# Patient Record
Sex: MALE | Race: BLACK OR AFRICAN AMERICAN | NOT HISPANIC OR LATINO | Employment: FULL TIME | URBAN - METROPOLITAN AREA
[De-identification: names, ages, dates, MRNs, and addresses within clinical notes are randomized per-mention and may not be internally consistent; named-entity substitution may affect disease eponyms.]

---

## 2023-04-14 LAB
ALANINE AMINOTRANSFERASE (SGPT) (U/L) IN SER/PLAS: 14 U/L (ref 10–52)
ALBUMIN (G/DL) IN SER/PLAS: 4.2 G/DL (ref 3.4–5)
ALKALINE PHOSPHATASE (U/L) IN SER/PLAS: 71 U/L (ref 33–136)
ANION GAP IN SER/PLAS: 12 MMOL/L (ref 10–20)
ASPARTATE AMINOTRANSFERASE (SGOT) (U/L) IN SER/PLAS: 21 U/L (ref 9–39)
BASOPHILS (10*3/UL) IN BLOOD BY AUTOMATED COUNT: 0.04 X10E9/L (ref 0–0.1)
BASOPHILS/100 LEUKOCYTES IN BLOOD BY AUTOMATED COUNT: 0.6 % (ref 0–2)
BILIRUBIN TOTAL (MG/DL) IN SER/PLAS: 0.6 MG/DL (ref 0–1.2)
CALCIUM (MG/DL) IN SER/PLAS: 9.8 MG/DL (ref 8.6–10.6)
CARBON DIOXIDE, TOTAL (MMOL/L) IN SER/PLAS: 31 MMOL/L (ref 21–32)
CHLORIDE (MMOL/L) IN SER/PLAS: 106 MMOL/L (ref 98–107)
CHOLESTEROL (MG/DL) IN SER/PLAS: 164 MG/DL (ref 0–199)
CHOLESTEROL IN HDL (MG/DL) IN SER/PLAS: 53.8 MG/DL
CHOLESTEROL/HDL RATIO: 3
CREATININE (MG/DL) IN SER/PLAS: 1.16 MG/DL (ref 0.5–1.3)
EOSINOPHILS (10*3/UL) IN BLOOD BY AUTOMATED COUNT: 0.23 X10E9/L (ref 0–0.7)
EOSINOPHILS/100 LEUKOCYTES IN BLOOD BY AUTOMATED COUNT: 3.3 % (ref 0–6)
ERYTHROCYTE DISTRIBUTION WIDTH (RATIO) BY AUTOMATED COUNT: 14.1 % (ref 11.5–14.5)
ERYTHROCYTE MEAN CORPUSCULAR HEMOGLOBIN CONCENTRATION (G/DL) BY AUTOMATED: 32.3 G/DL (ref 32–36)
ERYTHROCYTE MEAN CORPUSCULAR VOLUME (FL) BY AUTOMATED COUNT: 101 FL (ref 80–100)
ERYTHROCYTES (10*6/UL) IN BLOOD BY AUTOMATED COUNT: 4.56 X10E12/L (ref 4.5–5.9)
GFR MALE: 70 ML/MIN/1.73M2
GLUCOSE (MG/DL) IN SER/PLAS: 84 MG/DL (ref 74–99)
HEMATOCRIT (%) IN BLOOD BY AUTOMATED COUNT: 46.2 % (ref 41–52)
HEMOGLOBIN (G/DL) IN BLOOD: 14.9 G/DL (ref 13.5–17.5)
IMMATURE GRANULOCYTES/100 LEUKOCYTES IN BLOOD BY AUTOMATED COUNT: 0.3 % (ref 0–0.9)
LDL: 96 MG/DL (ref 0–99)
LEUKOCYTES (10*3/UL) IN BLOOD BY AUTOMATED COUNT: 7 X10E9/L (ref 4.4–11.3)
LYMPHOCYTES (10*3/UL) IN BLOOD BY AUTOMATED COUNT: 1.63 X10E9/L (ref 1.2–4.8)
LYMPHOCYTES/100 LEUKOCYTES IN BLOOD BY AUTOMATED COUNT: 23.4 % (ref 13–44)
MONOCYTES (10*3/UL) IN BLOOD BY AUTOMATED COUNT: 0.6 X10E9/L (ref 0.1–1)
MONOCYTES/100 LEUKOCYTES IN BLOOD BY AUTOMATED COUNT: 8.6 % (ref 2–10)
NEUTROPHILS (10*3/UL) IN BLOOD BY AUTOMATED COUNT: 4.46 X10E9/L (ref 1.2–7.7)
NEUTROPHILS/100 LEUKOCYTES IN BLOOD BY AUTOMATED COUNT: 63.8 % (ref 40–80)
NRBC (PER 100 WBCS) BY AUTOMATED COUNT: 0 /100 WBC (ref 0–0)
PLATELETS (10*3/UL) IN BLOOD AUTOMATED COUNT: 262 X10E9/L (ref 150–450)
POTASSIUM (MMOL/L) IN SER/PLAS: 4.4 MMOL/L (ref 3.5–5.3)
PROTEIN TOTAL: 6.6 G/DL (ref 6.4–8.2)
SODIUM (MMOL/L) IN SER/PLAS: 145 MMOL/L (ref 136–145)
THYROTROPIN (MIU/L) IN SER/PLAS BY DETECTION LIMIT <= 0.05 MIU/L: 1.09 MIU/L (ref 0.44–3.98)
TRIGLYCERIDE (MG/DL) IN SER/PLAS: 70 MG/DL (ref 0–149)
UREA NITROGEN (MG/DL) IN SER/PLAS: 21 MG/DL (ref 6–23)
VLDL: 14 MG/DL (ref 0–40)

## 2023-04-20 ENCOUNTER — OFFICE VISIT (OUTPATIENT)
Dept: PRIMARY CARE | Facility: CLINIC | Age: 63
End: 2023-04-20
Payer: COMMERCIAL

## 2023-04-20 VITALS
DIASTOLIC BLOOD PRESSURE: 74 MMHG | BODY MASS INDEX: 36.98 KG/M2 | SYSTOLIC BLOOD PRESSURE: 128 MMHG | WEIGHT: 244 LBS | HEIGHT: 68 IN

## 2023-04-20 DIAGNOSIS — F32.A DEPRESSION, UNSPECIFIED DEPRESSION TYPE: ICD-10-CM

## 2023-04-20 DIAGNOSIS — E66.9 OBESITY, UNSPECIFIED CLASSIFICATION, UNSPECIFIED OBESITY TYPE, UNSPECIFIED WHETHER SERIOUS COMORBIDITY PRESENT: Primary | ICD-10-CM

## 2023-04-20 PROBLEM — Z98.84 BARIATRIC SURGERY STATUS: Status: ACTIVE | Noted: 2023-04-20

## 2023-04-20 PROBLEM — M54.16 RIGHT LUMBAR RADICULOPATHY: Status: ACTIVE | Noted: 2023-04-20

## 2023-04-20 PROBLEM — R10.9 ABDOMINAL PAIN: Status: ACTIVE | Noted: 2023-04-20

## 2023-04-20 PROBLEM — K21.9 GERD (GASTROESOPHAGEAL REFLUX DISEASE): Status: ACTIVE | Noted: 2023-04-20

## 2023-04-20 PROBLEM — I95.9 HYPOTENSION: Status: ACTIVE | Noted: 2023-04-20

## 2023-04-20 PROBLEM — M19.049 OSTEOARTHRITIS, HAND: Status: ACTIVE | Noted: 2023-04-20

## 2023-04-20 PROBLEM — G47.33 OBSTRUCTIVE SLEEP APNEA, ADULT: Status: ACTIVE | Noted: 2023-04-20

## 2023-04-20 PROBLEM — N28.9 RENAL INSUFFICIENCY: Status: ACTIVE | Noted: 2023-04-20

## 2023-04-20 PROBLEM — M65.30 ACQUIRED TRIGGER FINGER: Status: ACTIVE | Noted: 2023-04-20

## 2023-04-20 PROBLEM — M17.11 DEGENERATIVE JOINT DISEASE OF RIGHT KNEE: Status: ACTIVE | Noted: 2023-04-20

## 2023-04-20 PROBLEM — E55.9 VITAMIN D DEFICIENCY: Status: ACTIVE | Noted: 2023-04-20

## 2023-04-20 PROBLEM — D64.9 ANEMIA: Status: ACTIVE | Noted: 2023-04-20

## 2023-04-20 PROBLEM — G47.00 INSOMNIA: Status: ACTIVE | Noted: 2023-04-20

## 2023-04-20 PROBLEM — L70.4 INFANTILE ACNE: Status: ACTIVE | Noted: 2023-04-20

## 2023-04-20 PROBLEM — R63.5 WEIGHT GAIN: Status: ACTIVE | Noted: 2023-04-20

## 2023-04-20 PROBLEM — F54 PSYCHOLOGICAL FACTOR AFFECTING PHYSICAL CONDITION: Status: ACTIVE | Noted: 2023-04-20

## 2023-04-20 PROBLEM — E78.00 HYPERCHOLESTEROLEMIA: Status: ACTIVE | Noted: 2023-04-20

## 2023-04-20 PROBLEM — E66.01 MORBID OBESITY (MULTI): Status: ACTIVE | Noted: 2023-04-20

## 2023-04-20 PROBLEM — K64.9 HEMORRHOIDS: Status: ACTIVE | Noted: 2023-04-20

## 2023-04-20 PROBLEM — M19.90 ARTHRITIS: Status: ACTIVE | Noted: 2023-04-20

## 2023-04-20 PROBLEM — R73.03 PRE-DIABETES: Status: ACTIVE | Noted: 2023-04-20

## 2023-04-20 PROBLEM — F41.9 ANXIETY: Status: ACTIVE | Noted: 2023-04-20

## 2023-04-20 PROBLEM — M54.50 LOW BACK PAIN: Status: ACTIVE | Noted: 2023-04-20

## 2023-04-20 PROBLEM — I10 HYPERTENSION: Status: ACTIVE | Noted: 2023-04-20

## 2023-04-20 PROBLEM — F10.20 ALCOHOLISM (MULTI): Status: ACTIVE | Noted: 2023-04-20

## 2023-04-20 PROBLEM — R42 DIZZINESS: Status: ACTIVE | Noted: 2023-04-20

## 2023-04-20 PROBLEM — I48.91 AFIB (MULTI): Status: ACTIVE | Noted: 2023-04-20

## 2023-04-20 PROBLEM — M17.11 ARTHRITIS OF RIGHT KNEE: Status: ACTIVE | Noted: 2023-04-20

## 2023-04-20 PROBLEM — M96.1 LUMBAR POSTLAMINECTOMY SYNDROME: Status: ACTIVE | Noted: 2023-04-20

## 2023-04-20 PROBLEM — M25.561 RIGHT KNEE PAIN: Status: ACTIVE | Noted: 2023-04-20

## 2023-04-20 PROBLEM — M79.643 HAND PAIN: Status: ACTIVE | Noted: 2023-04-20

## 2023-04-20 PROBLEM — R35.0 URINE FREQUENCY: Status: ACTIVE | Noted: 2023-04-20

## 2023-04-20 PROBLEM — M32.9 LUPUS (MULTI): Status: ACTIVE | Noted: 2023-04-20

## 2023-04-20 PROCEDURE — 99214 OFFICE O/P EST MOD 30 MIN: CPT | Performed by: INTERNAL MEDICINE

## 2023-04-20 PROCEDURE — 3078F DIAST BP <80 MM HG: CPT | Performed by: INTERNAL MEDICINE

## 2023-04-20 PROCEDURE — 3074F SYST BP LT 130 MM HG: CPT | Performed by: INTERNAL MEDICINE

## 2023-04-20 RX ORDER — SILDENAFIL 100 MG/1
100 TABLET, FILM COATED ORAL
COMMUNITY
Start: 2023-02-06 | End: 2024-04-24 | Stop reason: WASHOUT

## 2023-04-20 RX ORDER — ROSUVASTATIN CALCIUM 40 MG/1
40 TABLET, COATED ORAL DAILY
COMMUNITY
End: 2023-08-19 | Stop reason: SDUPTHER

## 2023-04-20 RX ORDER — DULOXETIN HYDROCHLORIDE 60 MG/1
60 CAPSULE, DELAYED RELEASE ORAL 2 TIMES DAILY
COMMUNITY
End: 2023-06-05 | Stop reason: SDUPTHER

## 2023-04-20 RX ORDER — CHOLECALCIFEROL (VITAMIN D3) 10(400)/ML
DROPS ORAL
COMMUNITY
Start: 2022-12-20 | End: 2024-04-10 | Stop reason: WASHOUT

## 2023-04-20 RX ORDER — APIXABAN 5 MG/1
5 TABLET, FILM COATED ORAL 2 TIMES DAILY
COMMUNITY
End: 2024-04-10 | Stop reason: WASHOUT

## 2023-04-20 RX ORDER — IBUPROFEN 800 MG/1
1 TABLET ORAL
COMMUNITY
Start: 2023-02-24 | End: 2024-04-24 | Stop reason: WASHOUT

## 2023-04-20 RX ORDER — ACETAMINOPHEN AND CODEINE PHOSPHATE 300; 30 MG/1; MG/1
TABLET ORAL
COMMUNITY
Start: 2023-02-24 | End: 2024-04-10 | Stop reason: WASHOUT

## 2023-04-20 RX ORDER — TRAZODONE HYDROCHLORIDE 150 MG/1
1 TABLET ORAL NIGHTLY
COMMUNITY
Start: 2022-07-21 | End: 2023-08-29

## 2023-04-20 RX ORDER — GABAPENTIN 300 MG/1
300 CAPSULE ORAL 3 TIMES DAILY
COMMUNITY
End: 2023-07-06 | Stop reason: SDUPTHER

## 2023-04-20 RX ORDER — FERROUS SULFATE 324(65)MG
1 TABLET, DELAYED RELEASE (ENTERIC COATED) ORAL DAILY
COMMUNITY
Start: 2019-04-15 | End: 2024-02-28 | Stop reason: SDUPTHER

## 2023-04-20 RX ORDER — OMEPRAZOLE 40 MG/1
1 CAPSULE, DELAYED RELEASE ORAL DAILY
COMMUNITY
Start: 2022-12-10 | End: 2024-04-10 | Stop reason: WASHOUT

## 2023-04-20 RX ORDER — SOLIFENACIN SUCCINATE 10 MG/1
10 TABLET, FILM COATED ORAL DAILY
COMMUNITY

## 2023-04-20 RX ORDER — MIRTAZAPINE 45 MG/1
45 TABLET, FILM COATED ORAL NIGHTLY
Qty: 30 TABLET | Refills: 2 | Status: SHIPPED | OUTPATIENT
Start: 2023-04-20 | End: 2023-08-29 | Stop reason: SDUPTHER

## 2023-04-20 RX ORDER — NAPROXEN SODIUM 220 MG/1
TABLET, FILM COATED ORAL
COMMUNITY

## 2023-04-20 RX ORDER — METOPROLOL TARTRATE 50 MG/1
1 TABLET ORAL 2 TIMES DAILY
COMMUNITY
Start: 2023-03-26 | End: 2023-10-10 | Stop reason: SDUPTHER

## 2023-04-20 RX ORDER — EZETIMIBE 10 MG/1
10 TABLET ORAL DAILY
COMMUNITY
End: 2023-06-05 | Stop reason: SDUPTHER

## 2023-04-20 RX ORDER — FOLIC ACID 1 MG/1
1 TABLET ORAL DAILY
COMMUNITY
End: 2024-04-24 | Stop reason: WASHOUT

## 2023-04-20 RX ORDER — LOSARTAN POTASSIUM 50 MG/1
50 TABLET ORAL DAILY
COMMUNITY
End: 2023-06-16 | Stop reason: SDUPTHER

## 2023-04-20 RX ORDER — URSODIOL 300 MG/1
1 CAPSULE ORAL 2 TIMES DAILY
COMMUNITY
Start: 2023-04-07 | End: 2024-04-10 | Stop reason: WASHOUT

## 2023-04-20 RX ORDER — AMIODARONE HYDROCHLORIDE 200 MG/1
1 TABLET ORAL DAILY
COMMUNITY
Start: 2019-10-25 | End: 2023-12-04

## 2023-04-20 NOTE — PROGRESS NOTES
OFFICE NOTE    NAME OF THE PATIENT: Dilshad Freeman    YOB: 1960    CHIEF COMPLAINT:  It is always a delight to serve Mr. Freeman.  Today, he came here:  He wants to lose weight.  He wants to go back on Contrave.  He has hit a plateau now.  He is not happy with that.  Sleep.  He told me that mirtazapine worked before.  He wants to get rid of the current medication.  Follow-up on blood work and other conditions.  Appetite and weight are okay.  No problem.  He is living by himself.  He is living okay.    PAST MEDICAL HISTORY:  Reviewed on EMR, unchanged.  Many significant for status post big back surgery, he had and gastric bypass.  Hypertension, high cholesterol, pre-diabetic, depression, and chronic pain syndrome.    CURRENT MEDICATIONS:  Reviewed on EMR, unchanged.  He is on Cymbalta, amiodarone, ursodiol, Eliquis, metoprolol, rosuvastatin, ferrous sulfate, Zetia, gabapentin, VESIcare, losartan, and now mirtazapine.    ALLERGIES:  Reviewed on EMR, unchanged.    SOCIAL HISTORY:  Reviewed on EMR, unchanged.  He does not smoke and does not drink alcohol.    FAMILY HISTORY:  Reviewed on EMR, unchanged.    REVIEW OF SYSTEMS:  All 12 systems reviewed and pertaining covered in history and physical.    PHYSICAL EXAMINATION  VITAL SIGNS:  As recorded and reviewed from EMR.  RESPIRATORY:  The patient had normal inspirations and expirations.  The breath sounds were equal bilaterally and clear to auscultation.  CARDIOVASCULAR:  The patient had S1 normal, split S2 without obvious rubs, clicks, or murmurs.    GASTROINTESTINAL:  There was no hepatosplenomegaly.  There were no palpable masses and no inguinal nodes.  EXTREMITIES:  Legs had no edema.  NEUROLOGIC:  The patient had normal cranial nerves.  The reflexes, sensory, and motor examination were grossly within normal limits.    LAB WORK:  Laboratory testing discussed.    ASSESSMENT AND PLAN:  Overweight.  He wants to go back on Contrave.  He tolerated it  "well, start.  Insomnia.  We will factor in mirtazapine, it worked for him.  Back pain, stable.  Hypertension, excellent.  Cholesterol, excellent.  Pre-diabetic, doing good.  Neuropathic pain.  Neurontin.  Anxiety and depression, okay.  Follow up in a month.  Continue to follow.      Kindly review this note in conjunction with EMR.   Subjective   Patient ID: Dilshad Freeman is a 62 y.o. male who presents for Follow-up.      HPI    Review of Systems    Objective   /74   Ht 1.727 m (5' 8\")   Wt 111 kg (244 lb)   BMI 37.10 kg/m²       Physical Exam    Assessment/Plan   Problem List Items Addressed This Visit    None        "

## 2023-06-01 ENCOUNTER — TELEPHONE (OUTPATIENT)
Dept: PRIMARY CARE | Facility: CLINIC | Age: 63
End: 2023-06-01
Payer: COMMERCIAL

## 2023-06-05 ENCOUNTER — OFFICE VISIT (OUTPATIENT)
Dept: PRIMARY CARE | Facility: CLINIC | Age: 63
End: 2023-06-05
Payer: COMMERCIAL

## 2023-06-05 VITALS
WEIGHT: 245 LBS | HEIGHT: 68 IN | DIASTOLIC BLOOD PRESSURE: 86 MMHG | SYSTOLIC BLOOD PRESSURE: 144 MMHG | BODY MASS INDEX: 37.13 KG/M2

## 2023-06-05 DIAGNOSIS — G56.00 CARPAL TUNNEL SYNDROME, UNSPECIFIED LATERALITY: ICD-10-CM

## 2023-06-05 DIAGNOSIS — R20.0 NUMBNESS AND TINGLING: ICD-10-CM

## 2023-06-05 DIAGNOSIS — M54.2 NECK PAIN: ICD-10-CM

## 2023-06-05 DIAGNOSIS — F32.A DEPRESSION, UNSPECIFIED DEPRESSION TYPE: ICD-10-CM

## 2023-06-05 DIAGNOSIS — F41.9 ANXIETY: ICD-10-CM

## 2023-06-05 DIAGNOSIS — R20.2 NUMBNESS AND TINGLING: ICD-10-CM

## 2023-06-05 PROCEDURE — 3077F SYST BP >= 140 MM HG: CPT | Performed by: INTERNAL MEDICINE

## 2023-06-05 PROCEDURE — 99214 OFFICE O/P EST MOD 30 MIN: CPT | Performed by: INTERNAL MEDICINE

## 2023-06-05 PROCEDURE — 1159F MED LIST DOCD IN RCRD: CPT | Performed by: INTERNAL MEDICINE

## 2023-06-05 PROCEDURE — 3079F DIAST BP 80-89 MM HG: CPT | Performed by: INTERNAL MEDICINE

## 2023-06-05 RX ORDER — DULOXETIN HYDROCHLORIDE 60 MG/1
60 CAPSULE, DELAYED RELEASE ORAL 2 TIMES DAILY
Qty: 180 CAPSULE | Refills: 0 | Status: SHIPPED | OUTPATIENT
Start: 2023-06-05 | End: 2023-10-10 | Stop reason: SDUPTHER

## 2023-06-05 RX ORDER — EZETIMIBE 10 MG/1
10 TABLET ORAL DAILY
Qty: 90 TABLET | Refills: 0 | Status: SHIPPED | OUTPATIENT
Start: 2023-06-05 | End: 2023-10-10 | Stop reason: SDUPTHER

## 2023-06-05 NOTE — PROGRESS NOTES
OFFICE NOTE    NAME OF THE PATIENT:   Dilshad Freeman     YOB: 1960    CHIEF COMPLAINT:  This gentleman today came here for multiple medical issues.  He is not feeling okay.  Left arm for no good reason he got this tremors.  When he tried to feed himself, he cannot get it right and this is all new.  He never had this problem.  All problems are left side, nothing on right side.  He does not recall fall, trauma, injury.  He brought medications for review.    PAST MEDICAL HISTORY:  Reviewed on EMR, unchanged.    CURRENT MEDICATIONS:  Reviewed on EMR, unchanged.  Rosuvastatin, VESIcare, amiodarone, metoprolol, duloxetine, Zetia, losartan, gabapentin, and Eliquis.    ALLERGIES:  Reviewed on EMR, unchanged.    SOCIAL HISTORY:  Reviewed on EMR, unchanged.  He does not smoke, does not drink alcohol.    FAMILY HISTORY:  Reviewed on EMR, unchanged.    REVIEW OF SYSTEMS:  All 12 systems reviewed and pertaining covered in history and physical.    PHYSICAL EXAMINATION  VITAL SIGNS:  As recorded and reviewed from EMR.  RESPIRATORY:  The patient had normal inspirations and expirations.  The breath sounds were equal bilaterally and clear to auscultation.  CARDIOVASCULAR:  The patient had S1 normal, split S2 without obvious rubs, clicks, or murmurs.    GASTROINTESTINAL:  There was no hepatosplenomegaly.  There were no palpable masses and no inguinal nodes.  EXTREMITIES:  Legs had no edema.  NEUROLOGIC:  The patient had normal cranial nerves.  The reflexes, sensory, and motor examination were grossly within normal limits.  LOCAL EXAM:  Left hand arm tremors present, frequency 3 to 5, at times coarse.  Finger-to-nose test  with difficulty he could do, tip of the finger to finger of the nose left side.  Right side normal.  Balance test okay.  No other weakness.  No other tremors in any part of the body.    LAB WORK:  Laboratory testing ordered.    ASSESSMENT AND PLAN:  Tremors in left hand.  Differential diagnosis  "Parkinson's disease in left arm.  I doubt it is essential tremor.  It is only in left arm.  Right arm nothing.  I decided to do MRI of C-spine, nerve conduction study to rule out any compression neuropathy.  I will also send him to Movement disorder specialist for that.  We thought about starting propanolol.  He is already on metoprolol.  I will monitor.  Hypertension, okay.  High cholesterol, stable.  Anxiety and depression, on duloxetine.  Follow-up in a week after testing.  Meanwhile, he might have to go to Occupational Medicine to get the proper fork and spoon so he can eat.    Kindly review this note in conjunction with EMR.   Subjective   Patient ID: Dilshad Freeman is a 63 y.o. male who presents for Follow-up.      HPI    Review of Systems    Objective   /86   Ht 1.727 m (5' 8\")   Wt 111 kg (245 lb)   BMI 37.25 kg/m²       Physical Exam    Assessment/Plan   Problem List Items Addressed This Visit          Other    Anxiety    Depression         "

## 2023-06-13 DIAGNOSIS — I10 PRIMARY HYPERTENSION: ICD-10-CM

## 2023-06-16 RX ORDER — LOSARTAN POTASSIUM 50 MG/1
50 TABLET ORAL DAILY
Qty: 90 TABLET | Refills: 1 | Status: SHIPPED | OUTPATIENT
Start: 2023-06-16 | End: 2024-02-28 | Stop reason: SDUPTHER

## 2023-06-22 DIAGNOSIS — G56.00 CARPAL TUNNEL SYNDROME, UNSPECIFIED LATERALITY: ICD-10-CM

## 2023-07-06 DIAGNOSIS — M54.50 LOW BACK PAIN, UNSPECIFIED BACK PAIN LATERALITY, UNSPECIFIED CHRONICITY, UNSPECIFIED WHETHER SCIATICA PRESENT: ICD-10-CM

## 2023-07-06 RX ORDER — GABAPENTIN 300 MG/1
300 CAPSULE ORAL 3 TIMES DAILY
Qty: 270 CAPSULE | Refills: 0 | Status: SHIPPED | OUTPATIENT
Start: 2023-07-06 | End: 2024-04-10 | Stop reason: WASHOUT

## 2023-08-08 ENCOUNTER — HOSPITAL ENCOUNTER (OUTPATIENT)
Dept: DATA CONVERSION | Facility: HOSPITAL | Age: 63
Discharge: HOME | End: 2023-08-11
Attending: EMERGENCY MEDICINE

## 2023-08-08 DIAGNOSIS — M54.9 DORSALGIA, UNSPECIFIED: ICD-10-CM

## 2023-08-08 DIAGNOSIS — R41.0 DISORIENTATION, UNSPECIFIED: ICD-10-CM

## 2023-08-08 DIAGNOSIS — F10.139 ALCOHOL ABUSE WITH WITHDRAWAL, UNSPECIFIED (MULTI): ICD-10-CM

## 2023-08-08 DIAGNOSIS — F41.9 ANXIETY DISORDER, UNSPECIFIED: ICD-10-CM

## 2023-08-08 DIAGNOSIS — I10 ESSENTIAL (PRIMARY) HYPERTENSION: ICD-10-CM

## 2023-08-08 DIAGNOSIS — Z79.01 LONG TERM (CURRENT) USE OF ANTICOAGULANTS: ICD-10-CM

## 2023-08-08 DIAGNOSIS — Z79.899 OTHER LONG TERM (CURRENT) DRUG THERAPY: ICD-10-CM

## 2023-08-08 DIAGNOSIS — Z79.82 LONG TERM (CURRENT) USE OF ASPIRIN: ICD-10-CM
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
## 2023-08-08 DIAGNOSIS — F32.A DEPRESSION, UNSPECIFIED: ICD-10-CM

## 2023-08-08 LAB
ALBUMIN SERPL-MCNC: 4.1 GM/DL (ref 3.5–5)
ALBUMIN/GLOB SERPL: 1.5 RATIO (ref 1.5–3)
ALP BLD-CCNC: 95 U/L (ref 35–125)
ALT SERPL-CCNC: 14 U/L (ref 5–40)
AMPHETAMINES UR QL SCN>1000 NG/ML: NEGATIVE
ANION GAP SERPL CALCULATED.3IONS-SCNC: 12 MMOL/L (ref 0–19)
AST SERPL-CCNC: 26 U/L (ref 5–40)
BACTERIA UR QL AUTO: NEGATIVE
BARBITURATES UR QL SCN>300 NG/ML: NEGATIVE
BASOPHILS # BLD AUTO: 0.05 K/UL (ref 0–0.22)
BASOPHILS NFR BLD AUTO: 0.9 % (ref 0–1)
BENZODIAZ UR QL SCN>300 NG/ML: NEGATIVE
BILIRUB SERPL-MCNC: 0.5 MG/DL (ref 0.1–1.2)
BILIRUB UR QL STRIP.AUTO: NEGATIVE
BUN SERPL-MCNC: 20 MG/DL (ref 8–25)
BUN/CREAT SERPL: 18.2 RATIO (ref 8–21)
BZE UR QL SCN>300 NG/ML: NEGATIVE
CALCIUM SERPL-MCNC: 9.3 MG/DL (ref 8.5–10.4)
CANNABINOIDS UR QL SCN>50 NG/ML: NEGATIVE
CHLORIDE SERPL-SCNC: 99 MMOL/L (ref 97–107)
CLARITY UR: CLEAR
CO2 SERPL-SCNC: 25 MMOL/L (ref 24–31)
COLOR UR: YELLOW
CREAT SERPL-MCNC: 1.1 MG/DL (ref 0.4–1.6)
DEPRECATED RDW RBC AUTO: 53.5 FL (ref 37–54)
DIFFERENTIAL METHOD BLD: ABNORMAL
DRUG SCREEN COMMENT UR-IMP: NORMAL
EOSINOPHIL # BLD AUTO: 0.21 K/UL (ref 0–0.45)
EOSINOPHIL NFR BLD: 3.6 % (ref 0–3)
ERYTHROCYTE [DISTWIDTH] IN BLOOD BY AUTOMATED COUNT: 15 % (ref 11.7–15)
ETHANOL SERPL-MCNC: 0.01 GM/DL (ref 0–0.01)
EUA DISCLAIMER: NORMAL
FENTANYL+NORFENTANYL UR QL SCN: NEGATIVE
FLUAV RNA NPH QL NAA+PROBE: NEGATIVE
FLUBV RNA NPH QL NAA+PROBE: NEGATIVE
GFR SERPL CREATININE-BSD FRML MDRD: 74 ML/MIN/1.73 M2
GLOBULIN SER-MCNC: 2.7 G/DL (ref 1.9–3.7)
GLUCOSE SERPL-MCNC: 78 MG/DL (ref 65–99)
GLUCOSE UR STRIP.AUTO-MCNC: NEGATIVE MG/DL
HCT VFR BLD AUTO: 38.1 % (ref 41–50)
HGB BLD-MCNC: 13.2 GM/DL (ref 13.5–16.5)
HGB UR QL STRIP.AUTO: 1 /HPF (ref 0–3)
HGB UR QL: NEGATIVE
HYALINE CASTS UR QL AUTO: ABNORMAL /LPF
IMM GRANULOCYTES # BLD AUTO: 0.03 K/UL (ref 0–0.1)
KETONES UR QL STRIP.AUTO: NEGATIVE
LEUKOCYTE ESTERASE UR QL STRIP.AUTO: NEGATIVE
LYMPHOCYTES # BLD AUTO: 1 K/UL (ref 1.2–3.2)
LYMPHOCYTES NFR BLD MANUAL: 17.2 % (ref 20–40)
MCH RBC QN AUTO: 33.6 PG (ref 26–34)
MCHC RBC AUTO-ENTMCNC: 34.6 % (ref 31–37)
MCV RBC AUTO: 96.9 FL (ref 80–100)
METHADONE UR QL SCN>300 NG/ML: NEGATIVE
MICROSCOPIC (UA): ABNORMAL
MONOCYTES # BLD AUTO: 0.46 K/UL (ref 0–0.8)
MONOCYTES NFR BLD MANUAL: 7.9 % (ref 0–8)
NEUTROPHILS # BLD AUTO: 4.07 K/UL
NEUTROPHILS # BLD AUTO: 4.07 K/UL (ref 1.8–7.7)
NEUTROPHILS.IMMATURE NFR BLD: 0.5 % (ref 0–1)
NEUTS SEG NFR BLD: 69.9 % (ref 50–70)
NITRITE UR QL STRIP.AUTO: NEGATIVE
NRBC BLD-RTO: 0 /100 WBC
OPIATES UR QL SCN>300 NG/ML: NEGATIVE
OXYCODONE UR QL: NEGATIVE
PCP UR QL SCN>25 NG/ML: NEGATIVE
PH UR STRIP.AUTO: 6 [PH] (ref 4.6–8)
PLATELET # BLD AUTO: 208 K/UL (ref 150–450)
PMV BLD AUTO: 8.5 CU (ref 7–12.6)
POTASSIUM SERPL-SCNC: 4.2 MMOL/L (ref 3.4–5.1)
PROT SERPL-MCNC: 6.8 G/DL (ref 5.9–7.9)
PROT UR STRIP.AUTO-MCNC: ABNORMAL MG/DL
RBC # BLD AUTO: 3.93 M/UL (ref 4.5–5.5)
SARS-COV-2 RNA SPEC QL NAA+PROBE: NEGATIVE
SODIUM SERPL-SCNC: 136 MMOL/L (ref 133–145)
SP GR UR STRIP.AUTO: 1.02 (ref 1–1.03)
SQUAMOUS UR QL AUTO: ABNORMAL /HPF
URINE CULTURE: ABNORMAL
UROBILINOGEN UR QL STRIP.AUTO: NORMAL MG/DL (ref 0–1)
WBC # BLD AUTO: 5.8 K/UL (ref 4.5–11)
WBC #/AREA URNS AUTO: ABNORMAL /HPF (ref 0–3)

## 2023-08-08 PROCEDURE — 99222 1ST HOSP IP/OBS MODERATE 55: CPT | Performed by: INTERNAL MEDICINE

## 2023-08-09 LAB
ANTICOAGULANT: NORMAL
APPEARANCE PLAS: NORMAL
APTT PPP: 32.4 SEC (ref 22–32.5)
CHOLEST SERPL-MCNC: 187 MG/DL (ref 133–200)
CHOLEST/HDLC SERPL: 2.3 RATIO
CK SERPL-CCNC: 125 U/L (ref 24–195)
COLOR SPUN FLD: NORMAL
FASTING STATUS PATIENT QL REPORTED: NORMAL
HDLC SERPL-MCNC: 81 MG/DL
INR PPP: 1 (ref 0.86–1.16)
LDLC SERPL CALC-MCNC: 95 MG/DL (ref 65–130)
PROTHROMBIN TIME: 10.9 SEC (ref 9.3–12.7)
TRIGL SERPL-MCNC: 57 MG/DL (ref 40–150)

## 2023-08-09 PROCEDURE — 99232 SBSQ HOSP IP/OBS MODERATE 35: CPT | Performed by: INTERNAL MEDICINE

## 2023-08-10 LAB
ALBUMIN SERPL-MCNC: 3.4 GM/DL (ref 3.5–5)
ALBUMIN/GLOB SERPL: 1.5 RATIO (ref 1.5–3)
ALP BLD-CCNC: 79 U/L (ref 35–125)
ALT SERPL-CCNC: 9 U/L (ref 5–40)
AMMONIA PLAS-SCNC: 19 UMOL/L (ref 12–45)
ANION GAP SERPL CALCULATED.3IONS-SCNC: 9 MMOL/L (ref 0–19)
AST SERPL-CCNC: 22 U/L (ref 5–40)
BASOPHILS # BLD AUTO: 0.03 K/UL (ref 0–0.22)
BASOPHILS NFR BLD AUTO: 0.7 % (ref 0–1)
BILIRUB SERPL-MCNC: 0.5 MG/DL (ref 0.1–1.2)
BUN SERPL-MCNC: 18 MG/DL (ref 8–25)
BUN/CREAT SERPL: 15 RATIO (ref 8–21)
CALCIUM SERPL-MCNC: 8.4 MG/DL (ref 8.5–10.4)
CHLORIDE SERPL-SCNC: 104 MMOL/L (ref 97–107)
CO2 SERPL-SCNC: 26 MMOL/L (ref 24–31)
CREAT SERPL-MCNC: 1.2 MG/DL (ref 0.4–1.6)
DEPRECATED RDW RBC AUTO: 54.6 FL (ref 37–54)
DIFFERENTIAL METHOD BLD: ABNORMAL
EOSINOPHIL # BLD AUTO: 0.19 K/UL (ref 0–0.45)
EOSINOPHIL NFR BLD: 4.5 % (ref 0–3)
ERYTHROCYTE [DISTWIDTH] IN BLOOD BY AUTOMATED COUNT: 15.1 % (ref 11.7–15)
GFR SERPL CREATININE-BSD FRML MDRD: 67 ML/MIN/1.73 M2
GLOBULIN SER-MCNC: 2.3 G/DL (ref 1.9–3.7)
GLUCOSE SERPL-MCNC: 97 MG/DL (ref 65–99)
HCT VFR BLD AUTO: 35.4 % (ref 41–50)
HGB BLD-MCNC: 11.8 GM/DL (ref 13.5–16.5)
IMM GRANULOCYTES # BLD AUTO: 0.01 K/UL (ref 0–0.1)
LYMPHOCYTES # BLD AUTO: 1.11 K/UL (ref 1.2–3.2)
LYMPHOCYTES NFR BLD MANUAL: 26.1 % (ref 20–40)
MCH RBC QN AUTO: 32.6 PG (ref 26–34)
MCHC RBC AUTO-ENTMCNC: 33.3 % (ref 31–37)
MCV RBC AUTO: 97.8 FL (ref 80–100)
MONOCYTES # BLD AUTO: 0.44 K/UL (ref 0–0.8)
MONOCYTES NFR BLD MANUAL: 10.3 % (ref 0–8)
NEUTROPHILS # BLD AUTO: 2.48 K/UL
NEUTROPHILS # BLD AUTO: 2.48 K/UL (ref 1.8–7.7)
NEUTROPHILS.IMMATURE NFR BLD: 0.2 % (ref 0–1)
NEUTS SEG NFR BLD: 58.2 % (ref 50–70)
NRBC BLD-RTO: 0 /100 WBC
PLATELET # BLD AUTO: 185 K/UL (ref 150–450)
PMV BLD AUTO: 9.3 CU (ref 7–12.6)
POTASSIUM SERPL-SCNC: 4.5 MMOL/L (ref 3.4–5.1)
PROT SERPL-MCNC: 5.7 G/DL (ref 5.9–7.9)
RBC # BLD AUTO: 3.62 M/UL (ref 4.5–5.5)
SODIUM SERPL-SCNC: 139 MMOL/L (ref 133–145)
WBC # BLD AUTO: 4.3 K/UL (ref 4.5–11)

## 2023-08-10 PROCEDURE — 99232 SBSQ HOSP IP/OBS MODERATE 35: CPT | Performed by: INTERNAL MEDICINE

## 2023-08-11 LAB
ALBUMIN SERPL-MCNC: 3.5 GM/DL (ref 3.5–5)
ALBUMIN/GLOB SERPL: 1.5 RATIO (ref 1.5–3)
ALP BLD-CCNC: 78 U/L (ref 35–125)
ALT SERPL-CCNC: 11 U/L (ref 5–40)
ANION GAP SERPL CALCULATED.3IONS-SCNC: 8 MMOL/L (ref 0–19)
AST SERPL-CCNC: 24 U/L (ref 5–40)
BASOPHILS # BLD AUTO: 0.03 K/UL (ref 0–0.22)
BASOPHILS NFR BLD AUTO: 0.7 % (ref 0–1)
BILIRUB SERPL-MCNC: 0.4 MG/DL (ref 0.1–1.2)
BUN SERPL-MCNC: 18 MG/DL (ref 8–25)
BUN/CREAT SERPL: 15 RATIO (ref 8–21)
CALCIUM SERPL-MCNC: 8.4 MG/DL (ref 8.5–10.4)
CHLORIDE SERPL-SCNC: 104 MMOL/L (ref 97–107)
CO2 SERPL-SCNC: 27 MMOL/L (ref 24–31)
CREAT SERPL-MCNC: 1.2 MG/DL (ref 0.4–1.6)
DEPRECATED RDW RBC AUTO: 56.8 FL (ref 37–54)
DIFFERENTIAL METHOD BLD: ABNORMAL
EOSINOPHIL # BLD AUTO: 0.25 K/UL (ref 0–0.45)
EOSINOPHIL NFR BLD: 5.8 % (ref 0–3)
ERYTHROCYTE [DISTWIDTH] IN BLOOD BY AUTOMATED COUNT: 15.6 % (ref 11.7–15)
GFR SERPL CREATININE-BSD FRML MDRD: 67 ML/MIN/1.73 M2
GLOBULIN SER-MCNC: 2.4 G/DL (ref 1.9–3.7)
GLUCOSE SERPL-MCNC: 89 MG/DL (ref 65–99)
HCT VFR BLD AUTO: 36.7 % (ref 41–50)
HGB BLD-MCNC: 12 GM/DL (ref 13.5–16.5)
IMM GRANULOCYTES # BLD AUTO: 0.02 K/UL (ref 0–0.1)
LYMPHOCYTES # BLD AUTO: 1.25 K/UL (ref 1.2–3.2)
LYMPHOCYTES NFR BLD MANUAL: 28.8 % (ref 20–40)
MCH RBC QN AUTO: 32.3 PG (ref 26–34)
MCHC RBC AUTO-ENTMCNC: 32.7 % (ref 31–37)
MCV RBC AUTO: 98.9 FL (ref 80–100)
MONOCYTES # BLD AUTO: 0.57 K/UL (ref 0–0.8)
MONOCYTES NFR BLD MANUAL: 13.1 % (ref 0–8)
NEUTROPHILS # BLD AUTO: 2.22 K/UL
NEUTROPHILS # BLD AUTO: 2.22 K/UL (ref 1.8–7.7)
NEUTROPHILS.IMMATURE NFR BLD: 0.5 % (ref 0–1)
NEUTS SEG NFR BLD: 51.1 % (ref 50–70)
NRBC BLD-RTO: 0 /100 WBC
PLATELET # BLD AUTO: 183 K/UL (ref 150–450)
PMV BLD AUTO: 9.1 CU (ref 7–12.6)
POTASSIUM SERPL-SCNC: 4.6 MMOL/L (ref 3.4–5.1)
PROT SERPL-MCNC: 5.9 G/DL (ref 5.9–7.9)
RBC # BLD AUTO: 3.71 M/UL (ref 4.5–5.5)
SODIUM SERPL-SCNC: 139 MMOL/L (ref 133–145)
WBC # BLD AUTO: 4.3 K/UL (ref 4.5–11)

## 2023-08-11 PROCEDURE — 99238 HOSP IP/OBS DSCHRG MGMT 30/<: CPT | Performed by: INTERNAL MEDICINE

## 2023-08-17 DIAGNOSIS — E78.00 HYPERCHOLESTEROLEMIA: ICD-10-CM

## 2023-08-19 RX ORDER — ROSUVASTATIN CALCIUM 40 MG/1
40 TABLET, COATED ORAL DAILY
Qty: 90 TABLET | Refills: 0 | Status: SHIPPED | OUTPATIENT
Start: 2023-08-19 | End: 2023-12-06

## 2023-08-29 ENCOUNTER — OFFICE VISIT (OUTPATIENT)
Dept: PRIMARY CARE | Facility: CLINIC | Age: 63
End: 2023-08-29
Payer: COMMERCIAL

## 2023-08-29 VITALS
HEIGHT: 68 IN | WEIGHT: 258 LBS | BODY MASS INDEX: 39.1 KG/M2 | SYSTOLIC BLOOD PRESSURE: 128 MMHG | DIASTOLIC BLOOD PRESSURE: 70 MMHG

## 2023-08-29 DIAGNOSIS — E78.00 HYPERCHOLESTEROLEMIA: ICD-10-CM

## 2023-08-29 DIAGNOSIS — I10 PRIMARY HYPERTENSION: ICD-10-CM

## 2023-08-29 DIAGNOSIS — F32.A DEPRESSION, UNSPECIFIED DEPRESSION TYPE: ICD-10-CM

## 2023-08-29 PROBLEM — R09.02 HYPOXIA: Status: ACTIVE | Noted: 2023-08-29

## 2023-08-29 PROBLEM — G40.909 SEIZURE DISORDER (MULTI): Status: ACTIVE | Noted: 2023-08-29

## 2023-08-29 PROBLEM — C61 CARCINOMA OF PROSTATE (MULTI): Status: ACTIVE | Noted: 2023-08-29

## 2023-08-29 PROBLEM — F10.10 ALCOHOL ABUSE: Status: ACTIVE | Noted: 2023-08-29

## 2023-08-29 PROBLEM — I50.32 CHRONIC DIASTOLIC HEART FAILURE (MULTI): Status: ACTIVE | Noted: 2023-08-29

## 2023-08-29 PROBLEM — T14.8XXA CONTUSION: Status: ACTIVE | Noted: 2023-08-29

## 2023-08-29 PROBLEM — E78.5 DYSLIPIDEMIA: Status: ACTIVE | Noted: 2023-08-29

## 2023-08-29 PROBLEM — I50.9 HEART FAILURE (MULTI): Status: ACTIVE | Noted: 2023-08-29

## 2023-08-29 PROBLEM — E11.9 DIABETES MELLITUS WITHOUT COMPLICATION (MULTI): Status: ACTIVE | Noted: 2023-08-29

## 2023-08-29 PROBLEM — R73.09 BLOOD GLUCOSE ABNORMAL: Status: ACTIVE | Noted: 2023-08-29

## 2023-08-29 PROBLEM — M15.9 GENERALIZED OSTEOARTHRITIS: Status: ACTIVE | Noted: 2023-08-29

## 2023-08-29 PROBLEM — K90.9 MALABSORPTION (HHS-HCC): Status: ACTIVE | Noted: 2023-08-29

## 2023-08-29 PROBLEM — J98.4 RESTRICTIVE LUNG DISEASE: Status: ACTIVE | Noted: 2023-08-29

## 2023-08-29 PROBLEM — E21.3 HYPERPARATHYROIDISM (MULTI): Status: ACTIVE | Noted: 2023-08-29

## 2023-08-29 PROBLEM — R06.09 DYSPNEA ON EXERTION: Status: ACTIVE | Noted: 2023-08-29

## 2023-08-29 PROCEDURE — 3074F SYST BP LT 130 MM HG: CPT | Performed by: INTERNAL MEDICINE

## 2023-08-29 PROCEDURE — 3078F DIAST BP <80 MM HG: CPT | Performed by: INTERNAL MEDICINE

## 2023-08-29 PROCEDURE — 1159F MED LIST DOCD IN RCRD: CPT | Performed by: INTERNAL MEDICINE

## 2023-08-29 PROCEDURE — 4010F ACE/ARB THERAPY RXD/TAKEN: CPT | Performed by: INTERNAL MEDICINE

## 2023-08-29 PROCEDURE — 99213 OFFICE O/P EST LOW 20 MIN: CPT | Performed by: INTERNAL MEDICINE

## 2023-08-29 RX ORDER — LORAZEPAM 0.5 MG/1
0.5 TABLET ORAL 3 TIMES DAILY PRN
COMMUNITY
Start: 2023-08-11 | End: 2024-04-10 | Stop reason: WASHOUT

## 2023-08-29 RX ORDER — CLONIDINE HYDROCHLORIDE 0.1 MG/1
0.1 TABLET ORAL 3 TIMES DAILY
COMMUNITY
Start: 2023-08-22 | End: 2023-10-10 | Stop reason: SDUPTHER

## 2023-08-29 RX ORDER — MIRTAZAPINE 45 MG/1
45 TABLET, FILM COATED ORAL NIGHTLY
Qty: 90 TABLET | Refills: 0 | Status: SHIPPED | OUTPATIENT
Start: 2023-08-29 | End: 2023-12-26

## 2023-08-29 RX ORDER — NALTREXONE HYDROCHLORIDE 50 MG/1
50 TABLET, FILM COATED ORAL DAILY
COMMUNITY
Start: 2023-08-22 | End: 2023-10-10 | Stop reason: SDUPTHER

## 2023-08-29 ASSESSMENT — ENCOUNTER SYMPTOMS
OCCASIONAL FEELINGS OF UNSTEADINESS: 0
DEPRESSION: 0
LOSS OF SENSATION IN FEET: 0

## 2023-08-29 NOTE — PROGRESS NOTES
OFFICE NOTE    NAME OF THE PATIENT: Dilshad Freeman    YOB: 1960    CHIEF COMPLAINT: This gentleman today came here for follow-up of various conditions.  Overall, he is a happy person.  He lives lonely, was stressed out and he is seeing the counseling for anxiety and stress. He got much better.  He does not drink any alcohol.  No drugs.  He is trying to meditate and stay focused.    PAST MEDICAL HISTORY:  Reviewed on EMR, unchanged.    CURRENT MEDICATIONS:  Reviewed on EMR, unchanged.  New medicine of clonidine 0.1 mg three times a day, naltrexone 50 mg, ______, mirtazapine, ______ for prostate, Eliquis 5 mg b.i.d., gabapentin, ______, rosuvastatin, metoprolol, losartan, Zetia and amiodarone.    ALLERGIES:  Reviewed on EMR, unchanged.    SOCIAL HISTORY:  Reviewed on EMR, unchanged.  He does not smoke and does not drink alcohol.    FAMILY HISTORY:  Reviewed on EMR, unchanged.    REVIEW OF SYSTEMS:  All 12 systems reviewed and pertaining covered in history and physical.    PHYSICAL EXAMINATION  VITAL SIGNS:  As recorded and reviewed from EMR.  EYES:  The patient's sclerae white.  The pupils were equal and round.  ENT:  The patient's external ears were normal and the otoscopic examination was negative.  RESPIRATORY:  The patient had normal inspirations and expirations.  The breath sounds were equal bilaterally and clear to auscultation.  CARDIOVASCULAR:  The patient had S1 normal, split S2 without obvious rubs, clicks, or murmurs.    GASTROINTESTINAL:  There was no hepatosplenomegaly.  There were no palpable masses and no inguinal nodes.  EXTREMITIES:  Legs had no edema.  NEUROLOGIC:  The patient had normal cranial nerves.  The reflexes, sensory, and motor examination were grossly within normal limits.    LAB WORK: Laboratory testing discussed.     ASSESSMENT AND PLAN:  Atrial fibrillation.  Rate under control.  Hypertension and high cholesterol, stable.  Anxiety and stress, on medication.  I noticed  "that the patient is on metoprolol and clonidine, both that can cause slow heart rate, I will keep an eye.  Follow-up appointment in four weeks.  I will communicate over the phone.    Kindly review this note in conjunction with EMR.     Subjective   Patient ID: Dilshad Freeman is a 63 y.o. male who presents for Follow-up.      HPI    Review of Systems    Objective   /70   Ht 1.727 m (5' 8\")   Wt 117 kg (258 lb)   BMI 39.23 kg/m²       Physical Exam    Assessment/Plan   Problem List Items Addressed This Visit       Depression         "

## 2023-08-31 PROBLEM — M25.461 EFFUSION OF RIGHT KNEE: Status: ACTIVE | Noted: 2023-08-31

## 2023-08-31 PROBLEM — E66.812 CLASS 2 OBESITY WITH BODY MASS INDEX (BMI) OF 38.0 TO 38.9 IN ADULT: Status: ACTIVE | Noted: 2023-08-31

## 2023-08-31 PROBLEM — I25.10 CAD (CORONARY ARTERY DISEASE): Status: ACTIVE | Noted: 2023-08-31

## 2023-08-31 PROBLEM — I10 HYPERTENSIVE DISORDER: Status: ACTIVE | Noted: 2023-08-31

## 2023-08-31 PROBLEM — G47.10 HYPERSOMNIA: Status: ACTIVE | Noted: 2023-08-31

## 2023-08-31 PROBLEM — M17.9 OSTEOARTHRITIS OF KNEE: Status: ACTIVE | Noted: 2023-08-31

## 2023-08-31 PROBLEM — E66.9 CLASS 2 OBESITY WITH BODY MASS INDEX (BMI) OF 38.0 TO 38.9 IN ADULT: Status: ACTIVE | Noted: 2023-08-31

## 2023-08-31 PROBLEM — I48.0 PAROXYSMAL ATRIAL FIBRILLATION (MULTI): Status: ACTIVE | Noted: 2023-08-31

## 2023-08-31 PROBLEM — E66.812 CLASS 2 OBESITY WITH BODY MASS INDEX (BMI) OF 37.0 TO 37.9 IN ADULT: Status: ACTIVE | Noted: 2023-08-31

## 2023-08-31 PROBLEM — K21.9 ACID REFLUX: Status: ACTIVE | Noted: 2023-08-31

## 2023-08-31 PROBLEM — E66.9 CLASS 2 OBESITY WITH BODY MASS INDEX (BMI) OF 37.0 TO 37.9 IN ADULT: Status: ACTIVE | Noted: 2023-08-31

## 2023-08-31 PROBLEM — E78.5 HYPERLIPIDEMIA: Status: ACTIVE | Noted: 2023-08-31

## 2023-08-31 RX ORDER — CHOLECALCIFEROL (VITAMIN D3) 125 MCG
125 CAPSULE ORAL DAILY
COMMUNITY
End: 2024-04-23 | Stop reason: SDUPTHER

## 2023-08-31 RX ORDER — OXYCODONE HCL 5 MG/5 ML
SOLUTION, ORAL ORAL
COMMUNITY
End: 2024-04-10 | Stop reason: WASHOUT

## 2023-08-31 RX ORDER — CLINDAMYCIN PHOSPHATE 10 MG/G
1 GEL TOPICAL 3 TIMES DAILY
COMMUNITY
End: 2024-04-10 | Stop reason: WASHOUT

## 2023-08-31 RX ORDER — ENOXAPARIN SODIUM 100 MG/ML
INJECTION SUBCUTANEOUS
COMMUNITY
End: 2024-04-10 | Stop reason: WASHOUT

## 2023-09-26 ENCOUNTER — LAB (OUTPATIENT)
Dept: LAB | Facility: LAB | Age: 63
End: 2023-09-26
Payer: COMMERCIAL

## 2023-09-26 DIAGNOSIS — I10 PRIMARY HYPERTENSION: ICD-10-CM

## 2023-09-26 DIAGNOSIS — E78.00 HYPERCHOLESTEROLEMIA: ICD-10-CM

## 2023-09-26 LAB
ALANINE AMINOTRANSFERASE (SGPT) (U/L) IN SER/PLAS: 43 U/L (ref 10–52)
ALBUMIN (G/DL) IN SER/PLAS: 4.2 G/DL (ref 3.4–5)
ALKALINE PHOSPHATASE (U/L) IN SER/PLAS: 79 U/L (ref 33–136)
AMMONIA (UMOL/L) IN PLASMA: 33 UMOL/L
ANION GAP IN SER/PLAS: 9 MMOL/L (ref 10–20)
ASPARTATE AMINOTRANSFERASE (SGOT) (U/L) IN SER/PLAS: 40 U/L (ref 9–39)
BILIRUBIN TOTAL (MG/DL) IN SER/PLAS: 0.5 MG/DL (ref 0–1.2)
CALCIUM (MG/DL) IN SER/PLAS: 9.5 MG/DL (ref 8.6–10.6)
CARBON DIOXIDE, TOTAL (MMOL/L) IN SER/PLAS: 29 MMOL/L (ref 21–32)
CHLORIDE (MMOL/L) IN SER/PLAS: 107 MMOL/L (ref 98–107)
CREATININE (MG/DL) IN SER/PLAS: 1.8 MG/DL (ref 0.5–1.3)
ERYTHROCYTE DISTRIBUTION WIDTH (RATIO) BY AUTOMATED COUNT: 14.3 % (ref 11.5–14.5)
ERYTHROCYTE MEAN CORPUSCULAR HEMOGLOBIN CONCENTRATION (G/DL) BY AUTOMATED: 30.9 G/DL (ref 32–36)
ERYTHROCYTE MEAN CORPUSCULAR VOLUME (FL) BY AUTOMATED COUNT: 103 FL (ref 80–100)
ERYTHROCYTES (10*6/UL) IN BLOOD BY AUTOMATED COUNT: 4.22 X10E12/L (ref 4.5–5.9)
GFR MALE: 41 ML/MIN/1.73M2
GLUCOSE (MG/DL) IN SER/PLAS: 111 MG/DL (ref 74–99)
HEMATOCRIT (%) IN BLOOD BY AUTOMATED COUNT: 43.4 % (ref 41–52)
HEMOGLOBIN (G/DL) IN BLOOD: 13.4 G/DL (ref 13.5–17.5)
LEUKOCYTES (10*3/UL) IN BLOOD BY AUTOMATED COUNT: 4.6 X10E9/L (ref 4.4–11.3)
NRBC (PER 100 WBCS) BY AUTOMATED COUNT: 0 /100 WBC (ref 0–0)
PLATELETS (10*3/UL) IN BLOOD AUTOMATED COUNT: 233 X10E9/L (ref 150–450)
POTASSIUM (MMOL/L) IN SER/PLAS: 4.9 MMOL/L (ref 3.5–5.3)
PROTEIN TOTAL: 6.4 G/DL (ref 6.4–8.2)
SODIUM (MMOL/L) IN SER/PLAS: 140 MMOL/L (ref 136–145)
UREA NITROGEN (MG/DL) IN SER/PLAS: 34 MG/DL (ref 6–23)

## 2023-09-26 PROCEDURE — 80053 COMPREHEN METABOLIC PANEL: CPT

## 2023-09-26 PROCEDURE — 82140 ASSAY OF AMMONIA: CPT

## 2023-09-26 PROCEDURE — 36415 COLL VENOUS BLD VENIPUNCTURE: CPT

## 2023-09-26 PROCEDURE — 85027 COMPLETE CBC AUTOMATED: CPT

## 2023-09-27 DIAGNOSIS — C61 MALIGNANT NEOPLASM OF PROSTATE (MULTI): Primary | ICD-10-CM

## 2023-10-09 ENCOUNTER — TELEMEDICINE (OUTPATIENT)
Dept: PHARMACY | Facility: HOSPITAL | Age: 63
End: 2023-10-09
Payer: COMMERCIAL

## 2023-10-09 DIAGNOSIS — I48.0 PAROXYSMAL ATRIAL FIBRILLATION (MULTI): Primary | ICD-10-CM

## 2023-10-09 NOTE — PROGRESS NOTES
"Pharmacist Clinic: Anticoagulation Management  Dilshad Freeman was referred to the Clinical Pharmacy Team for his anticoagulation management.    Referring Provider:  Stephanie Sanchez    Last Appointment w/ Pharmacist: 6/29/2023  Pharmacist Name: Marahphilippe Meansris  _______________________________________________________________________  PHARMACY ASSESSMENT    Review of Past Appointment:   - Patient was referred to pharmacy clinic for Eliquis assistance. Patient was signed up for financial aid through [facility] and is now receiving Eliquis for free. Application will need to be renewed by 7/10/2024.    RELEVANT LAB RESULTS  Lab Results   Component Value Date    BILITOT 0.5 09/26/2023    CALCIUM 9.5 09/26/2023    CO2 29 09/26/2023     09/26/2023    CREATININE 1.80 (H) 09/26/2023    GLUCOSE 111 (H) 09/26/2023    ALKPHOS 79 09/26/2023    K 4.9 09/26/2023    PROT 6.4 09/26/2023     09/26/2023    AST 40 (H) 09/26/2023    ALT 43 09/26/2023    BUN 34 (H) 09/26/2023    ANIONGAP 9 (L) 09/26/2023    MG 1.83 11/02/2022    PHOS 2.3 (L) 01/25/2023     07/20/2020    ALBUMIN 4.2 09/26/2023    GFRMALE 41 (A) 09/26/2023     Lab Results   Component Value Date    TRIG 57 08/09/2023    CHOL 187 08/09/2023    LDLCALC 95 08/09/2023    HDL 81 08/09/2023     No results found for: \"BMCBC\", \"CBCDIF\"   _______________________________________________________________________  ANTICOAGULATION ASSESSMENT    The ASCVD Risk score (Kia HESTER, et al., 2019) failed to calculate for the following reasons:    The smoking status is invalid    DIAGNOSIS: prevention of nonvalvular atrial fibrilliation stroke and systemic embolism  - HAD1MM3-HIZC Score: [3] (only included if diagnosis is atrial fibrillation)   Age: [<65 (0)] [65-74 (+1)] [> 75 (+2)]: 1  Sex: [Male/Female (+1)]: 0  CHF history: [No/Yes(+1)]: 0  Hypertension history: [No/Yes(+1)]: 1  Stroke/TIA/thromboembolism history: [No/Yes(+2)]: 0  Vascular disease history (prior MI, peripheral " artery disease, aortic plaque): [No/Yes(+1)]: 0  Diabetes history: [No/Yes(+1)]: 1 - prediabetes?     CURRENT PHARMACOTHERAPY:   - Eliquis 5 mg BID  - 62 yo   - 117 kg  - Scr 1.80 (9/2023)    UPDATE ON PHARMACOTHERAPY:   Affordability/Accessibility: Patient is receiving Eliquis for free through [facility]. Application will need to be renewed by 7/10/2024  Adherence/Organization: Taking as prescribed  Adverse Effects: No   Recent Hospitalizations: Yes - [facility] (alcohol detox)  Recent Falls/Trauma: No  Changes in Tobacco or Alcohol Intake: Patient was recently admitted to a detox program. Patient states he has not drank since discharge. We reviewed the risk of drinking alcohol and taking blood thinners    DISCUSSION/NOTES:  - Overall, patient is doing well regarding his anticoagulant. He has no side effects to report at this time. No recent falls or trauma. We did talk a little about his alcohol intake. Patient is aware that this puts him at an increased risk of a bleed.     _______________________________________________________________________  PATIENT EDUCATION/GOALS  - Counseled patient on MOA, expectations, duration of therapy, contraindications, administration, and monitoring parameters  - Counseled patient of side effects that are indicative of bleeding such as dark tarry stool, unexplainable bruising, or vomiting up a coffee ground like substance  - Answered all patient questions and concerns  _______________________________________________________________________  RECOMMENDATIONS/PLAN  1. CONTINUE Eliquis 5 mg BID as prescribed   2. Lab panel - 9/2023 - no further ation at this time  3. Please call [facility] about 1 week before you run out of your medication to order your refill.     Next Cardiology Appointment: 3/29/2024  Clinical Pharmacist follow up: 1/10/2024 @ 1 pm (virtual)  VAF/Application Expiration: Yes    Date: 7/11/2024  Type of Encounter: Virtual    Marha Botello, LydiaD    Verbal  consent to manage patient's drug therapy was obtained from the patient . They were informed they may decline to participate or withdraw from participation in pharmacy services at any time.    Continue all meds under the continuation of care with the referring provider and clinical pharmacy team.

## 2023-10-10 ENCOUNTER — OFFICE VISIT (OUTPATIENT)
Dept: PRIMARY CARE | Facility: CLINIC | Age: 63
End: 2023-10-10
Payer: COMMERCIAL

## 2023-10-10 ENCOUNTER — LAB (OUTPATIENT)
Dept: LAB | Facility: LAB | Age: 63
End: 2023-10-10
Payer: COMMERCIAL

## 2023-10-10 VITALS
WEIGHT: 259 LBS | SYSTOLIC BLOOD PRESSURE: 132 MMHG | DIASTOLIC BLOOD PRESSURE: 70 MMHG | BODY MASS INDEX: 39.25 KG/M2 | HEIGHT: 68 IN

## 2023-10-10 DIAGNOSIS — F41.9 ANXIETY: ICD-10-CM

## 2023-10-10 DIAGNOSIS — F10.939 ALCOHOL WITHDRAWAL SYNDROME WITH COMPLICATION (MULTI): ICD-10-CM

## 2023-10-10 DIAGNOSIS — E27.9 ADRENAL DISORDER (MULTI): ICD-10-CM

## 2023-10-10 DIAGNOSIS — E78.00 HYPERCHOLESTEROLEMIA: ICD-10-CM

## 2023-10-10 DIAGNOSIS — N40.0 BENIGN PROSTATIC HYPERPLASIA, UNSPECIFIED WHETHER LOWER URINARY TRACT SYMPTOMS PRESENT: ICD-10-CM

## 2023-10-10 DIAGNOSIS — Z79.01 CURRENT USE OF LONG TERM ANTICOAGULATION: ICD-10-CM

## 2023-10-10 DIAGNOSIS — M54.10 RADICULOPATHY, UNSPECIFIED SPINAL REGION: ICD-10-CM

## 2023-10-10 DIAGNOSIS — N28.9 RENAL INSUFFICIENCY: ICD-10-CM

## 2023-10-10 DIAGNOSIS — M54.9 BACK PAIN, UNSPECIFIED BACK LOCATION, UNSPECIFIED BACK PAIN LATERALITY, UNSPECIFIED CHRONICITY: Primary | ICD-10-CM

## 2023-10-10 DIAGNOSIS — F32.A DEPRESSION, UNSPECIFIED DEPRESSION TYPE: ICD-10-CM

## 2023-10-10 DIAGNOSIS — I10 PRIMARY HYPERTENSION: ICD-10-CM

## 2023-10-10 LAB
ALBUMIN SERPL BCP-MCNC: 4.6 G/DL (ref 3.4–5)
ALP SERPL-CCNC: 91 U/L (ref 33–136)
ALT SERPL W P-5'-P-CCNC: 31 U/L (ref 10–52)
ANION GAP SERPL CALC-SCNC: 13 MMOL/L (ref 10–20)
AST SERPL W P-5'-P-CCNC: 33 U/L (ref 9–39)
BILIRUB SERPL-MCNC: 0.8 MG/DL (ref 0–1.2)
BUN SERPL-MCNC: 20 MG/DL (ref 6–23)
CALCIUM SERPL-MCNC: 10.1 MG/DL (ref 8.6–10.6)
CHLORIDE SERPL-SCNC: 102 MMOL/L (ref 98–107)
CO2 SERPL-SCNC: 31 MMOL/L (ref 21–32)
CREAT SERPL-MCNC: 1.21 MG/DL (ref 0.5–1.3)
GFR SERPL CREATININE-BSD FRML MDRD: 66 ML/MIN/1.73M*2
GLUCOSE SERPL-MCNC: 94 MG/DL (ref 74–99)
POTASSIUM SERPL-SCNC: 4.6 MMOL/L (ref 3.5–5.3)
PROT SERPL-MCNC: 7 G/DL (ref 6.4–8.2)
SODIUM SERPL-SCNC: 141 MMOL/L (ref 136–145)

## 2023-10-10 PROCEDURE — 82140 ASSAY OF AMMONIA: CPT

## 2023-10-10 PROCEDURE — 99214 OFFICE O/P EST MOD 30 MIN: CPT | Performed by: INTERNAL MEDICINE

## 2023-10-10 PROCEDURE — 1159F MED LIST DOCD IN RCRD: CPT | Performed by: INTERNAL MEDICINE

## 2023-10-10 PROCEDURE — 80053 COMPREHEN METABOLIC PANEL: CPT

## 2023-10-10 PROCEDURE — 3078F DIAST BP <80 MM HG: CPT | Performed by: INTERNAL MEDICINE

## 2023-10-10 PROCEDURE — 3075F SYST BP GE 130 - 139MM HG: CPT | Performed by: INTERNAL MEDICINE

## 2023-10-10 PROCEDURE — 4010F ACE/ARB THERAPY RXD/TAKEN: CPT | Performed by: INTERNAL MEDICINE

## 2023-10-10 PROCEDURE — 36415 COLL VENOUS BLD VENIPUNCTURE: CPT

## 2023-10-10 RX ORDER — CLONIDINE HYDROCHLORIDE 0.1 MG/1
0.1 TABLET ORAL 3 TIMES DAILY
Qty: 270 TABLET | Refills: 1 | Status: SHIPPED | OUTPATIENT
Start: 2023-10-10 | End: 2024-05-06 | Stop reason: SDUPTHER

## 2023-10-10 RX ORDER — METOPROLOL TARTRATE 50 MG/1
50 TABLET ORAL 2 TIMES DAILY
Qty: 180 TABLET | Refills: 0 | Status: SHIPPED | OUTPATIENT
Start: 2023-10-10 | End: 2024-01-22 | Stop reason: SDUPTHER

## 2023-10-10 RX ORDER — NALTREXONE HYDROCHLORIDE 50 MG/1
50 TABLET, FILM COATED ORAL DAILY
Qty: 90 TABLET | Refills: 1 | Status: SHIPPED | OUTPATIENT
Start: 2023-10-10 | End: 2024-04-23 | Stop reason: SDUPTHER

## 2023-10-10 RX ORDER — DULOXETIN HYDROCHLORIDE 60 MG/1
60 CAPSULE, DELAYED RELEASE ORAL 2 TIMES DAILY
Qty: 180 CAPSULE | Refills: 0 | Status: SHIPPED | OUTPATIENT
Start: 2023-10-10 | End: 2024-01-22 | Stop reason: SDUPTHER

## 2023-10-10 RX ORDER — EZETIMIBE 10 MG/1
10 TABLET ORAL DAILY
Qty: 90 TABLET | Refills: 0 | Status: SHIPPED | OUTPATIENT
Start: 2023-10-10 | End: 2024-01-22 | Stop reason: SDUPTHER

## 2023-10-10 ASSESSMENT — ENCOUNTER SYMPTOMS
OCCASIONAL FEELINGS OF UNSTEADINESS: 0
DEPRESSION: 0
LOSS OF SENSATION IN FEET: 0

## 2023-10-10 NOTE — PROGRESS NOTES
"Subjective   Patient ID: Dilshad Freeman is a 63 y.o. male who presents for Follow-up (on various conditions).    This gentleman today came here for followup on various conditions.  Overall, he is a happy person.  Appetite and weight are okay.  No problem.  He is wondering whether I can give him naltrexone and clonidine.  Overall, feeling okay.  No problem.  Pain is under control.  He is not touching alcohol at all.  He came here for followup on various conditions.    I have personally reviewed the patient's Past Medical History, Medications, Allergies, Social History, and Family History in the EMR.    Review of Systems   All other systems reviewed and are negative.    Objective   /70   Ht 1.727 m (5' 8\")   Wt 117 kg (259 lb)   BMI 39.38 kg/m²     Physical Exam  Vitals reviewed.   Cardiovascular:      Heart sounds: Normal heart sounds, S1 normal and S2 normal. No murmur heard.     No friction rub.   Pulmonary:      Effort: Pulmonary effort is normal.      Breath sounds: Normal breath sounds and air entry.   Abdominal:      Palpations: There is no hepatomegaly, splenomegaly or mass.   Musculoskeletal:      Right lower leg: No edema.      Left lower leg: No edema.      Comments: Diffuse tenderness.   Lymphadenopathy:      Lower Body: No right inguinal adenopathy. No left inguinal adenopathy.   Neurological:      Cranial Nerves: Cranial nerves 2-12 are intact.      Sensory: No sensory deficit.      Motor: Motor function is intact.      Deep Tendon Reflexes: Reflexes are normal and symmetric.     LAB WORK: Laboratory testing discussed.    Assessment/Plan   Problem List Items Addressed This Visit             ICD-10-CM       Cardiac and Vasculature    Hypercholesterolemia E78.00    Hypertension I10       Genitourinary and Reproductive    Renal insufficiency N28.9       Mental Health    Anxiety F41.9    Depression F32.A     Other Visit Diagnoses         Codes    Back pain, unspecified back location, unspecified " back pain laterality, unspecified chronicity    -  Primary M54.9    Alcohol withdrawal syndrome with complication (CMS/HCC)     F10.939    Adrenal disorder (CMS/HCC)     E27.9    Relevant Orders    Comprehensive Metabolic Panel    Ammonia    Radiculopathy, unspecified spinal region     M54.10    Benign prostatic hyperplasia, unspecified whether lower urinary tract symptoms present     N40.0    Current use of long term anticoagulation     Z79.01        1. Back pain with radiculopathy.  ______.  2. Hypertension, okay.  3. High cholesterol, stable.  4. Renal insufficiency, new finding.  His kidney was always good.  Repeat test.  5. Alcohol.  Naltrexone and clonidine helping him.  Continue.  6. BPH, on medication.  PSA okay.  7. Anticoagulation, on Eliquis.  I might have to adjust the dose.  8. Followup appointment with me in three to four days’ time depending on today’s blood work.    Scribe Attestation  By signing my name below, I, George Avina attest that this documentation has been prepared under the direction and in the presence of Yahaira Alcantar MD.

## 2023-10-11 LAB — AMMONIA PLAS-SCNC: 33 UMOL/L (ref 16–53)

## 2023-10-23 ENCOUNTER — TELEMEDICINE (OUTPATIENT)
Dept: PRIMARY CARE | Facility: CLINIC | Age: 63
End: 2023-10-23
Payer: COMMERCIAL

## 2023-10-23 ENCOUNTER — PHARMACY VISIT (OUTPATIENT)
Dept: PHARMACY | Facility: CLINIC | Age: 63
End: 2023-10-23
Payer: MEDICARE

## 2023-10-23 DIAGNOSIS — F10.20 ALCOHOLISM (MULTI): ICD-10-CM

## 2023-10-23 DIAGNOSIS — T42.75XA ADVERSE EFFECT OF ANTIEPILEPTIC, INITIAL ENCOUNTER: Primary | ICD-10-CM

## 2023-10-23 DIAGNOSIS — G89.29 OTHER CHRONIC PAIN: ICD-10-CM

## 2023-10-23 PROCEDURE — 99213 OFFICE O/P EST LOW 20 MIN: CPT | Performed by: INTERNAL MEDICINE

## 2023-10-23 PROCEDURE — RXMED WILLOW AMBULATORY MEDICATION CHARGE

## 2023-10-24 NOTE — PROGRESS NOTES
Subjective   Patient ID: Dilshad Freeman is a 63 y.o. male who presents for Follow-up (dizziness).    This is a virtual visit done with the help of a camera on phone.  He is not feeling good.  He went to see his specialist for alcohol withdrawal.  He increased the dose of Neurontin, which he is not tolerating.  He is having dizziness problem.  Otherwise okay.  No problem.    I have personally reviewed the patient's Past Medical History, Medications, Allergies, Social History, and Family History in the EMR.    Review of Systems   All other systems reviewed and are negative.    Objective   Virtual exam.  There were no vitals taken for this visit.    Physical Exam  Eyes:      Comments: No nystagmus.   Musculoskeletal:      Right lower leg: No edema.      Left lower leg: No edema.     Assessment/Plan   Problem List Items Addressed This Visit             ICD-10-CM       Mental Health    Alcoholism (CMS/[facility]) F10.20     Other Visit Diagnoses         Codes    Adverse effect of antiepileptic, initial encounter    -  Primary T42.75XA    Other chronic pain     G89.29        1. Gabapentin intolerance.  Low dose he was tolerating.  High dose made him dizzy.  I am going to stop it and then restart again at very low dose.  2. Chronic pain.  Monitor.  3. Alcoholism.  He does not want to take medications.  He is okay.  4. Follow-up appointment with me in routine check in two to three week’s time.  5. Time taken 24 minutes, more than half in direct patient care on the phone camera.    Scribe Attestation  By signing my name below, IKirsty, George attest that this documentation has been prepared under the direction and in the presence of Yahaira Alcantar MD.

## 2024-01-10 ENCOUNTER — APPOINTMENT (OUTPATIENT)
Dept: PHARMACY | Facility: HOSPITAL | Age: 64
End: 2024-01-10
Payer: COMMERCIAL

## 2024-01-10 ENCOUNTER — TELEMEDICINE (OUTPATIENT)
Dept: PHARMACY | Facility: HOSPITAL | Age: 64
End: 2024-01-10
Payer: COMMERCIAL

## 2024-01-10 DIAGNOSIS — I48.0 PAROXYSMAL ATRIAL FIBRILLATION (MULTI): Primary | ICD-10-CM

## 2024-01-10 NOTE — Clinical Note
Hey Amyra! My resident did a follow up with Dilshad regarding his Eliquis. No issues to report at this time. Will continue to follow.

## 2024-01-10 NOTE — PROGRESS NOTES
"Pharmacist Clinic: Anticoagulation Management  Dilshad Freeman was referred to the Clinical Pharmacy Team for his anticoagulation management.    Referring Provider:  Stephanie Sanchez    Last Appointment w/ Pharmacist: 10/9/2023  Pharmacist Name: Marahphilippe Meansris  _______________________________________________________________________  PHARMACY ASSESSMENT    Review of Past Appointment:   - Patient is doing well regarding his anticoagulation (Eliquis). Patient denies adverse effects associated with the medication. Also discussed with patient the risk of drinking alcohol and taking Eliquis.    RELEVANT LAB RESULTS  Lab Results   Component Value Date    BILITOT 0.8 10/10/2023    CALCIUM 10.1 10/10/2023    CO2 31 10/10/2023     10/10/2023    CREATININE 1.21 10/10/2023    GLUCOSE 94 10/10/2023    ALKPHOS 91 10/10/2023    K 4.6 10/10/2023    PROT 7.0 10/10/2023     10/10/2023    AST 33 10/10/2023    ALT 31 10/10/2023    BUN 20 10/10/2023    ANIONGAP 13 10/10/2023    MG 1.83 11/02/2022    PHOS 2.3 (L) 01/25/2023     07/20/2020    ALBUMIN 4.6 10/10/2023    GFRMALE 41 (A) 09/26/2023     Lab Results   Component Value Date    TRIG 57 08/09/2023    CHOL 187 08/09/2023    LDLCALC 95 08/09/2023    HDL 81 08/09/2023     No results found for: \"BMCBC\", \"CBCDIF\"   _______________________________________________________________________  ANTICOAGULATION ASSESSMENT    The 10-year ASCVD risk score (Kia HESTER, et al., 2019) is: 25.9%    Values used to calculate the score:      Age: 63 years      Sex: Male      Is Non- : Yes      Diabetic: Yes      Tobacco smoker: No      Systolic Blood Pressure: 132 mmHg      Is BP treated: Yes      HDL Cholesterol: 81 MG/DL      Total Cholesterol: 187 MG/DL    DIAGNOSIS: prevention of nonvalvular atrial fibrilliation stroke and systemic embolism  - Patient is projected to be on anticoagulation Yes, Patient is taking Eliquis  - [id number] Score: [3] (only included if " diagnosis is atrial fibrillation)   Age: [<65 (0)] [65-74 (+1)] [> 75 (+2)]: 1  Sex: [Male/Female (+1)]: 0  CHF history: [No/Yes(+1)]: 0  Hypertension history: [No/Yes(+1)]: 1  Stroke/TIA/thromboembolism history: [No/Yes(+2)]: 0  Vascular disease history (prior MI, peripheral artery disease, aortic plaque): [No/Yes(+1)]: 0  Diabetes history: [No/Yes(+1)]: 1    CURRENT PHARMACOTHERAPY:   - Eliquis 5 mg BID  - 62 yo   - 117 kg  - Scr 1.80 (9/2023)    UPDATE ON PHARMACOTHERAPY:   Affordability/Accessibility: Patient receives Eliquis through [facility]  Adherence/Organization: Patient reports adherence to his Eliquis  Adverse Effects: None  Recent Hospitalizations: None  Recent Falls/Trauma: None  Changes in Tobacco or Alcohol Intake: Patient stated haven't drank since he was discharged from detox program.    DISCUSSION/NOTES:  - Patient is tolerating Eliquis well and denies adverse effects, hospitalizations, and falls/trauma. Patient also understands the risk of drinking alcohol and taking Eliquis.    _______________________________________________________________________  PATIENT EDUCATION/GOALS  - Counseled patient on MOA, expectations, duration of therapy, contraindications, administration, and monitoring parameters  - Counseled patient of side effects that are indicative of bleeding such as dark tarry stool, unexplainable bruising, or vomiting up a coffee ground like substance  - Answered all patient questions and concerns  _______________________________________________________________________  RECOMMENDATIONS/PLAN  1. Continue Eliquis 5 mg BID    Next Cardiology Appointment: 3/29/2024  Clinical Pharmacist follow up: 4/10/2024 @ 1 pm  VAF/Application Expiration: Yes    Date: 7/10/2024  Type of Encounter: Lydia HughesD  PGY-1 Pharmacy Resident    Verbal consent to manage patient's drug therapy was obtained from the patient . They were informed they may decline to participate or withdraw from  participation in pharmacy services at any time.    Continue all meds under the continuation of care with the referring provider and clinical pharmacy team.

## 2024-01-22 DIAGNOSIS — I10 PRIMARY HYPERTENSION: ICD-10-CM

## 2024-01-22 DIAGNOSIS — F32.A DEPRESSION, UNSPECIFIED DEPRESSION TYPE: ICD-10-CM

## 2024-01-22 DIAGNOSIS — F41.9 ANXIETY: ICD-10-CM

## 2024-01-22 RX ORDER — METOPROLOL TARTRATE 50 MG/1
50 TABLET ORAL 2 TIMES DAILY
Qty: 180 TABLET | Refills: 0 | Status: SHIPPED | OUTPATIENT
Start: 2024-01-22 | End: 2024-04-23 | Stop reason: SDUPTHER

## 2024-01-22 RX ORDER — EZETIMIBE 10 MG/1
10 TABLET ORAL DAILY
Qty: 90 TABLET | Refills: 0 | Status: SHIPPED | OUTPATIENT
Start: 2024-01-22 | End: 2024-04-23 | Stop reason: SDUPTHER

## 2024-01-22 RX ORDER — DULOXETIN HYDROCHLORIDE 60 MG/1
60 CAPSULE, DELAYED RELEASE ORAL 2 TIMES DAILY
Qty: 180 CAPSULE | Refills: 0 | Status: SHIPPED | OUTPATIENT
Start: 2024-01-22 | End: 2024-04-23 | Stop reason: SDUPTHER

## 2024-02-01 ENCOUNTER — OFFICE VISIT (OUTPATIENT)
Dept: PRIMARY CARE | Facility: CLINIC | Age: 64
End: 2024-02-01
Payer: COMMERCIAL

## 2024-02-01 ENCOUNTER — LAB (OUTPATIENT)
Dept: LAB | Facility: LAB | Age: 64
End: 2024-02-01
Payer: COMMERCIAL

## 2024-02-01 VITALS
HEIGHT: 68 IN | SYSTOLIC BLOOD PRESSURE: 124 MMHG | BODY MASS INDEX: 35.31 KG/M2 | DIASTOLIC BLOOD PRESSURE: 70 MMHG | WEIGHT: 233 LBS

## 2024-02-01 DIAGNOSIS — R10.84 GENERALIZED ABDOMINAL PAIN: ICD-10-CM

## 2024-02-01 DIAGNOSIS — R19.7 DIARRHEA, UNSPECIFIED TYPE: Primary | ICD-10-CM

## 2024-02-01 DIAGNOSIS — K21.9 GASTROESOPHAGEAL REFLUX DISEASE, UNSPECIFIED WHETHER ESOPHAGITIS PRESENT: ICD-10-CM

## 2024-02-01 DIAGNOSIS — K52.9 COLITIS: ICD-10-CM

## 2024-02-01 LAB
ALBUMIN SERPL BCP-MCNC: 4.5 G/DL (ref 3.4–5)
ALP SERPL-CCNC: 100 U/L (ref 33–136)
ALT SERPL W P-5'-P-CCNC: 46 U/L (ref 10–52)
AMYLASE SERPL-CCNC: 46 U/L (ref 29–103)
ANION GAP SERPL CALC-SCNC: 15 MMOL/L (ref 10–20)
AST SERPL W P-5'-P-CCNC: 43 U/L (ref 9–39)
BASOPHILS # BLD AUTO: 0.04 X10*3/UL (ref 0–0.1)
BASOPHILS NFR BLD AUTO: 0.6 %
BILIRUB SERPL-MCNC: 0.5 MG/DL (ref 0–1.2)
BUN SERPL-MCNC: 18 MG/DL (ref 6–23)
CALCIUM SERPL-MCNC: 10.3 MG/DL (ref 8.6–10.6)
CHLORIDE SERPL-SCNC: 103 MMOL/L (ref 98–107)
CO2 SERPL-SCNC: 28 MMOL/L (ref 21–32)
CREAT SERPL-MCNC: 1.43 MG/DL (ref 0.5–1.3)
EGFRCR SERPLBLD CKD-EPI 2021: 54 ML/MIN/1.73M*2
EOSINOPHIL # BLD AUTO: 0.34 X10*3/UL (ref 0–0.7)
EOSINOPHIL NFR BLD AUTO: 4.7 %
ERYTHROCYTE [DISTWIDTH] IN BLOOD BY AUTOMATED COUNT: 15.3 % (ref 11.5–14.5)
GLUCOSE SERPL-MCNC: 110 MG/DL (ref 74–99)
HCT VFR BLD AUTO: 45.7 % (ref 41–52)
HGB BLD-MCNC: 14.9 G/DL (ref 13.5–17.5)
IMM GRANULOCYTES # BLD AUTO: 0.05 X10*3/UL (ref 0–0.7)
IMM GRANULOCYTES NFR BLD AUTO: 0.7 % (ref 0–0.9)
LIPASE SERPL-CCNC: 15 U/L (ref 9–82)
LYMPHOCYTES # BLD AUTO: 1.81 X10*3/UL (ref 1.2–4.8)
LYMPHOCYTES NFR BLD AUTO: 25.1 %
MCH RBC QN AUTO: 31.6 PG (ref 26–34)
MCHC RBC AUTO-ENTMCNC: 32.6 G/DL (ref 32–36)
MCV RBC AUTO: 97 FL (ref 80–100)
MONOCYTES # BLD AUTO: 0.58 X10*3/UL (ref 0.1–1)
MONOCYTES NFR BLD AUTO: 8.1 %
NEUTROPHILS # BLD AUTO: 4.38 X10*3/UL (ref 1.2–7.7)
NEUTROPHILS NFR BLD AUTO: 60.8 %
NRBC BLD-RTO: 0 /100 WBCS (ref 0–0)
PLATELET # BLD AUTO: 312 X10*3/UL (ref 150–450)
POTASSIUM SERPL-SCNC: 4.2 MMOL/L (ref 3.5–5.3)
PROT SERPL-MCNC: 7 G/DL (ref 6.4–8.2)
RBC # BLD AUTO: 4.72 X10*6/UL (ref 4.5–5.9)
SODIUM SERPL-SCNC: 142 MMOL/L (ref 136–145)
WBC # BLD AUTO: 7.2 X10*3/UL (ref 4.4–11.3)

## 2024-02-01 PROCEDURE — 3074F SYST BP LT 130 MM HG: CPT | Performed by: INTERNAL MEDICINE

## 2024-02-01 PROCEDURE — 3078F DIAST BP <80 MM HG: CPT | Performed by: INTERNAL MEDICINE

## 2024-02-01 PROCEDURE — 99214 OFFICE O/P EST MOD 30 MIN: CPT | Performed by: INTERNAL MEDICINE

## 2024-02-01 PROCEDURE — 80053 COMPREHEN METABOLIC PANEL: CPT

## 2024-02-01 PROCEDURE — 4010F ACE/ARB THERAPY RXD/TAKEN: CPT | Performed by: INTERNAL MEDICINE

## 2024-02-01 PROCEDURE — 83690 ASSAY OF LIPASE: CPT

## 2024-02-01 PROCEDURE — 82150 ASSAY OF AMYLASE: CPT

## 2024-02-01 PROCEDURE — 85025 COMPLETE CBC W/AUTO DIFF WBC: CPT

## 2024-02-01 PROCEDURE — 1159F MED LIST DOCD IN RCRD: CPT | Performed by: INTERNAL MEDICINE

## 2024-02-01 NOTE — PROGRESS NOTES
"Subjective   Patient ID: Dilshad Freeman is a 63 y.o. male who presents for Abdominal Pain and Diarrhea.    Mr. Dilshad Freeman today came here for severe abdominal pain, lower abdominal pain, pressure, not feeling good, diarrhea intractable, happened out of the blue.  He does not think he got food postioning.  Upper abdominal pain.  Not feeling good.  He came here for follow-up.    I have personally reviewed the patient's Past Medical History, Medications, Allergies, Social History, and Family History in the EMR.    Review of Systems   All other systems reviewed and are negative.    Objective   /70   Ht 1.727 m (5' 8\")   Wt 106 kg (233 lb)   BMI 35.43 kg/m²     Physical Exam  Vitals reviewed.   Cardiovascular:      Heart sounds: Normal heart sounds, S1 normal and S2 normal. No murmur heard.     No friction rub.   Pulmonary:      Effort: Pulmonary effort is normal.      Breath sounds: Normal breath sounds and air entry.   Abdominal:      Palpations: There is no hepatomegaly, splenomegaly or mass.      Tenderness: There is no guarding or rebound.      Comments: Abdomen, diffuse tenderness.  Bowel sounds present.    Musculoskeletal:      Right lower leg: No edema.      Left lower leg: No edema.   Lymphadenopathy:      Lower Body: No right inguinal adenopathy. No left inguinal adenopathy.   Neurological:      Cranial Nerves: Cranial nerves 2-12 are intact.      Sensory: No sensory deficit.      Motor: Motor function is intact.      Deep Tendon Reflexes: Reflexes are normal and symmetric.     LAB WORK:  Laboratory testing discussed.    Assessment/Plan   Problem List Items Addressed This Visit             ICD-10-CM       Gastrointestinal and Abdominal    Abdominal pain R10.9    Relevant Orders    CT abdomen pelvis w IV contrast    Comprehensive Metabolic Panel    CBC and Auto Differential    Lipase    Amylase    GERD (gastroesophageal reflux disease) K21.9     Other Visit Diagnoses         Codes    Diarrhea, " unspecified type    -  Primary R19.7    Colitis     K52.9        1. Abdominal pain, diarrhea, and colitis.  Advised liquid diet.  Immediate CT scan and blood work ordered.      2. GERD, on PPI.  3. It looks like colitis.  Cipro and Flagyl started.  Not to take non-steroidal.  Liquid diet.  4. I shall see him immediately after scan.    Scribe Attestation  By signing my name below, I, George Sibley attest that this documentation has been prepared under the direction and in the presence of Yahaira Alcantar MD.

## 2024-02-02 ENCOUNTER — HOSPITAL ENCOUNTER (OUTPATIENT)
Dept: RADIOLOGY | Facility: CLINIC | Age: 64
Discharge: HOME | End: 2024-02-02
Payer: COMMERCIAL

## 2024-02-02 ENCOUNTER — OFFICE VISIT (OUTPATIENT)
Dept: PRIMARY CARE | Facility: CLINIC | Age: 64
End: 2024-02-02
Payer: COMMERCIAL

## 2024-02-02 VITALS
HEIGHT: 68 IN | BODY MASS INDEX: 35.31 KG/M2 | DIASTOLIC BLOOD PRESSURE: 70 MMHG | SYSTOLIC BLOOD PRESSURE: 130 MMHG | WEIGHT: 233 LBS

## 2024-02-02 DIAGNOSIS — F41.9 ANXIETY: ICD-10-CM

## 2024-02-02 DIAGNOSIS — E86.0 DEHYDRATION: ICD-10-CM

## 2024-02-02 DIAGNOSIS — R10.9 ABDOMINAL PAIN, UNSPECIFIED ABDOMINAL LOCATION: ICD-10-CM

## 2024-02-02 DIAGNOSIS — R19.7 DIARRHEA, UNSPECIFIED TYPE: Primary | ICD-10-CM

## 2024-02-02 DIAGNOSIS — R10.84 GENERALIZED ABDOMINAL PAIN: ICD-10-CM

## 2024-02-02 PROCEDURE — 74177 CT ABD & PELVIS W/CONTRAST: CPT

## 2024-02-02 PROCEDURE — 1159F MED LIST DOCD IN RCRD: CPT | Performed by: INTERNAL MEDICINE

## 2024-02-02 PROCEDURE — 3078F DIAST BP <80 MM HG: CPT | Performed by: INTERNAL MEDICINE

## 2024-02-02 PROCEDURE — 4010F ACE/ARB THERAPY RXD/TAKEN: CPT | Performed by: INTERNAL MEDICINE

## 2024-02-02 PROCEDURE — 3075F SYST BP GE 130 - 139MM HG: CPT | Performed by: INTERNAL MEDICINE

## 2024-02-02 PROCEDURE — 2550000001 HC RX 255 CONTRASTS: Performed by: INTERNAL MEDICINE

## 2024-02-02 PROCEDURE — 99213 OFFICE O/P EST LOW 20 MIN: CPT | Performed by: INTERNAL MEDICINE

## 2024-02-02 RX ORDER — CIPROFLOXACIN 500 MG/1
500 TABLET ORAL 2 TIMES DAILY
Qty: 20 TABLET | Refills: 0 | Status: SHIPPED | OUTPATIENT
Start: 2024-02-02 | End: 2024-02-12

## 2024-02-02 RX ORDER — HYDROXYZINE PAMOATE 25 MG/1
25 CAPSULE ORAL 3 TIMES DAILY PRN
Qty: 30 CAPSULE | Refills: 0 | Status: SHIPPED | OUTPATIENT
Start: 2024-02-02 | End: 2024-04-23 | Stop reason: SDUPTHER

## 2024-02-02 RX ORDER — METRONIDAZOLE 250 MG/1
500 TABLET ORAL 3 TIMES DAILY
Qty: 42 TABLET | Refills: 0 | Status: SHIPPED | OUTPATIENT
Start: 2024-02-02 | End: 2024-02-09

## 2024-02-02 RX ORDER — METRONIDAZOLE 250 MG/1
250 TABLET ORAL 3 TIMES DAILY
Qty: 21 TABLET | Refills: 0 | Status: SHIPPED | OUTPATIENT
Start: 2024-02-02 | End: 2024-02-02 | Stop reason: SDUPTHER

## 2024-02-02 RX ADMIN — IOHEXOL 75 ML: 350 INJECTION, SOLUTION INTRAVENOUS at 14:57

## 2024-02-02 ASSESSMENT — ENCOUNTER SYMPTOMS
OCCASIONAL FEELINGS OF UNSTEADINESS: 0
DEPRESSION: 0
LOSS OF SENSATION IN FEET: 0

## 2024-02-03 PROCEDURE — RXMED WILLOW AMBULATORY MEDICATION CHARGE

## 2024-02-03 NOTE — PROGRESS NOTES
"Subjective   Patient ID: Dilshad Freeman is a 63 y.o. male who presents for Follow-up (Multiple medical issues).    This gentleman today came here for abdominal pain, cramping, not feeling good.  He saw me yesterday.  He had a CT scan completed today.  He is trying to eat healthy.  He is very anxious.  He lives by himself.  He came for follow-up.    I have personally reviewed the patient's Past Medical History, Medications, Allergies, Social History, and Family History in the EMR.    Review of Systems   All other systems reviewed and are negative.    Objective   /70   Ht 1.727 m (5' 8\")   Wt 106 kg (233 lb)   BMI 35.43 kg/m²     Physical Exam  Vitals reviewed.   Cardiovascular:      Heart sounds: Normal heart sounds, S1 normal and S2 normal. No murmur heard.     No friction rub.   Pulmonary:      Effort: Pulmonary effort is normal.      Breath sounds: Normal breath sounds and air entry.   Abdominal:      Palpations: There is no hepatomegaly, splenomegaly or mass.      Tenderness: There is abdominal tenderness in the left lower quadrant.   Musculoskeletal:      Right lower leg: No edema.      Left lower leg: No edema.   Lymphadenopathy:      Lower Body: No right inguinal adenopathy. No left inguinal adenopathy.   Neurological:      Cranial Nerves: Cranial nerves 2-12 are intact.      Sensory: No sensory deficit.      Motor: Motor function is intact.      Deep Tendon Reflexes: Reflexes are normal and symmetric.     LAB WORK: Laboratory testing discussed.    Assessment/Plan   Problem List Items Addressed This Visit             ICD-10-CM       Gastrointestinal and Abdominal    Abdominal pain R10.9       Mental Health    Anxiety F41.9     Other Visit Diagnoses         Codes    Diarrhea, unspecified type    -  Primary R19.7    Relevant Medications    ciprofloxacin (Cipro) 500 mg tablet    metroNIDAZOLE (Flagyl) 250 mg tablet    hydrOXYzine pamoate (VistariL) 25 mg capsule    Dehydration     E86.0        1. " Abdominal pain, diarrhea, colitis.  CT scan pending.  Cipro, Flagyl.  2. Anxiety, on medication.  3. Dehydration, oral hydration.  4. The patient lives by himself, I am a bit concerned.  If situation gets bad, he will come to hospital.  I will make arrangements to get him admitted.  5. I will continue to follow.    Scribe Attestation  By signing my name below, I, Catarina Ray, George attest that this documentation has been prepared under the direction and in the presence of Yahaira Alcantar MD.

## 2024-02-05 ENCOUNTER — PHARMACY VISIT (OUTPATIENT)
Dept: PHARMACY | Facility: CLINIC | Age: 64
End: 2024-02-05
Payer: MEDICARE

## 2024-02-19 ENCOUNTER — TELEPHONE (OUTPATIENT)
Dept: SURGERY | Facility: CLINIC | Age: 64
End: 2024-02-19
Payer: COMMERCIAL

## 2024-02-19 NOTE — TELEPHONE ENCOUNTER
MEDICATION/ PATIENT MADE AWARE THAT PRESCRIPTION WAS FOR THE FIRST 6 MOS AFTER SURGERY AND DID NOT NEED A REFILL DUE TO IT HAS BEEN OVER 6 MOS. PATIENT ALSO MADE AWARE THAT HE MISSED HIS 6 MOS [facility] APPT OFFERED TO SCHEDULE AN APPT WITH DR. BRADFORD TO FOLLOW UP/ PATIENT DECLINES.  PATIENT MADE AWARE THAT DR. BRADFORD DOES LIKE TO FOLLOW UP WITH HIS PATIENTS ANNUALLY.  PATIENT STATES UNDERSTANDING.  PATIENT DID NOT MAKE AN APPT AT THIS TIME.

## 2024-02-28 DIAGNOSIS — E78.00 HYPERCHOLESTEROLEMIA: ICD-10-CM

## 2024-02-28 DIAGNOSIS — D63.8 ANEMIA IN OTHER CHRONIC DISEASES CLASSIFIED ELSEWHERE: Primary | ICD-10-CM

## 2024-02-28 DIAGNOSIS — I10 PRIMARY HYPERTENSION: ICD-10-CM

## 2024-02-28 RX ORDER — ROSUVASTATIN CALCIUM 40 MG/1
40 TABLET, COATED ORAL DAILY
Qty: 90 TABLET | Refills: 0 | Status: SHIPPED | OUTPATIENT
Start: 2024-02-28 | End: 2024-04-23 | Stop reason: SDUPTHER

## 2024-02-28 RX ORDER — LOSARTAN POTASSIUM 50 MG/1
50 TABLET ORAL DAILY
Qty: 90 TABLET | Refills: 0 | Status: SHIPPED | OUTPATIENT
Start: 2024-02-28 | End: 2024-04-23 | Stop reason: SDUPTHER

## 2024-02-28 RX ORDER — FERROUS SULFATE 324(65)MG
1 TABLET, DELAYED RELEASE (ENTERIC COATED) ORAL DAILY
Qty: 90 TABLET | Refills: 0 | Status: SHIPPED | OUTPATIENT
Start: 2024-02-28 | End: 2024-04-23 | Stop reason: SDUPTHER

## 2024-03-29 ENCOUNTER — APPOINTMENT (OUTPATIENT)
Dept: CARDIOLOGY | Facility: CLINIC | Age: 64
End: 2024-03-29
Payer: COMMERCIAL

## 2024-04-09 NOTE — PROGRESS NOTES
"Pharmacist Clinic: Anticoagulation Management  Dilshad Freeman was referred to the Clinical Pharmacy Team for his anticoagulation management.    Referring Provider:  Stephanie Sanchez    Last Appointment w/ Pharmacist: 1/10/2024  Pharmacist Name: Naheed Gerard  _______________________________________________________________________  PHARMACY ASSESSMENT    Review of Past Appointment:   - Patient was referred to clinical pharmacy for anticoagulation management. Patient is taking part in  PAP for Eliquis. Renewal: 7/10/2024  -  At last appnt, patient was  tolerating Eliquis well and denies adverse effects, hospitalizations, and falls/trauma. Patient also understood the risk of drinking alcohol and taking Eliquis.    RELEVANT LAB RESULTS  Lab Results   Component Value Date    BILITOT 0.5 02/01/2024    CALCIUM 10.3 02/01/2024    CO2 28 02/01/2024     02/01/2024    CREATININE 1.43 (H) 02/01/2024    GLUCOSE 110 (H) 02/01/2024    ALKPHOS 100 02/01/2024    K 4.2 02/01/2024    PROT 7.0 02/01/2024     02/01/2024    AST 43 (H) 02/01/2024    ALT 46 02/01/2024    BUN 18 02/01/2024    ANIONGAP 15 02/01/2024    MG 1.83 11/02/2022    PHOS 2.3 (L) 01/25/2023     07/20/2020    ALBUMIN 4.5 02/01/2024    AMYLASE 46 02/01/2024    LIPASE 15 02/01/2024    GFRMALE 41 (A) 09/26/2023     Lab Results   Component Value Date    TRIG 57 08/09/2023    CHOL 187 08/09/2023    LDLCALC 95 08/09/2023    HDL 81 08/09/2023     No results found for: \"BMCBC\", \"CBCDIF\"   _______________________________________________________________________  ANTICOAGULATION ASSESSMENT    The 10-year ASCVD risk score (Kia HESTER, et al., 2019) is: 25.2%    Values used to calculate the score:      Age: 63 years      Sex: Male      Is Non- : Yes      Diabetic: Yes      Tobacco smoker: No      Systolic Blood Pressure: 130 mmHg      Is BP treated: Yes      HDL Cholesterol: 81 MG/DL      Total Cholesterol: 187 MG/DL    DIAGNOSIS: " prevention of nonvalvular atrial fibrilliation stroke and systemic embolism  - Patient is projected to be on anticoagulation Yes, Patient is taking Eliquis  - [id number] Score: [3] (only included if diagnosis is atrial fibrillation)   Age: [<65 (0)] [65-74 (+1)] [> 75 (+2)]: 1  Sex: [Male/Female (+1)]: 0  CHF history: [No/Yes(+1)]: 0  Hypertension history: [No/Yes(+1)]: 1  Stroke/TIA/thromboembolism history: [No/Yes(+2)]: 0  Vascular disease history (prior MI, peripheral artery disease, aortic plaque): [No/Yes(+1)]: 0  Diabetes history: [No/Yes(+1)]: 1    CURRENT PHARMACOTHERAPY:   - Eliquis 5 mg BID  - 64 yo   - 106 kg  - Scr 1.43    UPDATE ON PHARMACOTHERAPY:   Affordability/Accessibility: Patient receives Eliquis through  PAP  Adherence/Organization: Eliquis 5 mg BID  Adverse Effects: No  Recent Hospitalizations: No  Recent Falls/Trauma: No - lives in apartment on first floor; patient does not use assistance to get around; will occasionally use cane  Changes in Tobacco or Alcohol Intake:   Tobacoo: No  Alcohol: Patient stated haven't drank since he was discharged from detox program.    DISCUSSION/NOTES:  - Overall, patient is doing very well on anticoagulation therapy. He continued to get Eliquis through  PAP. Will need to be renewed at next visit. No SE, hospitalizations, or falls to report. He lives on the first floor of apartment building. We did do a medication reconciliation today, as there were many medications he is no longer taking. This is has been updated in chart. Otherwise, I do not see any follow up appnts with cardiologist. Patient must see referring provider at least once per year to be part of our program. Cardio will be notified. Continuing to follow.     _______________________________________________________________________  PATIENT EDUCATION/GOALS  - Counseled patient on MOA, expectations, duration of therapy, contraindications, administration, and monitoring parameters  - Counseled  patient of side effects that are indicative of bleeding such as dark tarry stool, unexplainable bruising, or vomiting up a coffee ground like substance  - Answered all patient questions and concerns  _______________________________________________________________________  RECOMMENDATIONS/PLAN  1. Continue Eliquis 5 mg BID  2. Will need to collect updated income documents at next visit for  PAP application    Next Cardiology Appointment: TBD - must follow with cardio at least once per year to be part of pharmacy services  Clinical Pharmacist follow up: 6/12/2024  VAF/Application Expiration: Yes    Date: 7/10/2024  Type of Encounter: Winsome Kaur PharmD     Verbal consent to manage patient's drug therapy was obtained from the patient . They were informed they may decline to participate or withdraw from participation in pharmacy services at any time.    Continue all meds under the continuation of care with the referring provider and clinical pharmacy team.

## 2024-04-10 ENCOUNTER — TELEMEDICINE (OUTPATIENT)
Dept: PHARMACY | Facility: HOSPITAL | Age: 64
End: 2024-04-10
Payer: COMMERCIAL

## 2024-04-10 DIAGNOSIS — I48.0 PAROXYSMAL ATRIAL FIBRILLATION (MULTI): Primary | ICD-10-CM

## 2024-04-10 DIAGNOSIS — F10.939 ALCOHOL WITHDRAWAL SYNDROME WITH COMPLICATION (MULTI): ICD-10-CM

## 2024-04-10 RX ORDER — NALTREXONE HYDROCHLORIDE 50 MG/1
50 TABLET, FILM COATED ORAL DAILY
Qty: 90 TABLET | Refills: 1 | OUTPATIENT
Start: 2024-04-10

## 2024-04-10 NOTE — Clinical Note
Hi Mayra! Overall, patient is doing very well on anticoagulation therapy. He continued to get Eliquis through  PAP. Will need to be renewed at next visit. No SE, hospitalizations, or falls to report. He lives on the first floor of apartment building. We did do a medication reconciliation today, as there were many medications he is no longer taking. This is has been updated in chart. Otherwise, I do not see any follow up appnts with cardiologist. Patient must see referring provider at least once per year to be part of our program. Cardio will be notified. Continuing to follow.

## 2024-04-16 ENCOUNTER — APPOINTMENT (OUTPATIENT)
Dept: PRIMARY CARE | Facility: CLINIC | Age: 64
End: 2024-04-16
Payer: COMMERCIAL

## 2024-04-19 ENCOUNTER — APPOINTMENT (OUTPATIENT)
Dept: PRIMARY CARE | Facility: CLINIC | Age: 64
End: 2024-04-19
Payer: COMMERCIAL

## 2024-04-23 ENCOUNTER — OFFICE VISIT (OUTPATIENT)
Dept: PRIMARY CARE | Facility: CLINIC | Age: 64
End: 2024-04-23
Payer: COMMERCIAL

## 2024-04-23 ENCOUNTER — LAB (OUTPATIENT)
Dept: LAB | Facility: LAB | Age: 64
End: 2024-04-23
Payer: COMMERCIAL

## 2024-04-23 VITALS
WEIGHT: 238 LBS | BODY MASS INDEX: 36.07 KG/M2 | HEIGHT: 68 IN | SYSTOLIC BLOOD PRESSURE: 164 MMHG | DIASTOLIC BLOOD PRESSURE: 102 MMHG

## 2024-04-23 DIAGNOSIS — Z23 NEED FOR PNEUMOCOCCAL VACCINATION: ICD-10-CM

## 2024-04-23 DIAGNOSIS — F32.A DEPRESSION, UNSPECIFIED DEPRESSION TYPE: ICD-10-CM

## 2024-04-23 DIAGNOSIS — E55.9 VITAMIN D DEFICIENCY: ICD-10-CM

## 2024-04-23 DIAGNOSIS — Z00.00 ENCOUNTER FOR MEDICARE ANNUAL WELLNESS EXAM: ICD-10-CM

## 2024-04-23 DIAGNOSIS — F41.9 ANXIETY: ICD-10-CM

## 2024-04-23 DIAGNOSIS — E78.00 HYPERCHOLESTEROLEMIA: ICD-10-CM

## 2024-04-23 DIAGNOSIS — R53.83 OTHER FATIGUE: ICD-10-CM

## 2024-04-23 DIAGNOSIS — F10.939 ALCOHOL WITHDRAWAL SYNDROME WITH COMPLICATION (MULTI): ICD-10-CM

## 2024-04-23 DIAGNOSIS — R19.7 DIARRHEA, UNSPECIFIED TYPE: ICD-10-CM

## 2024-04-23 DIAGNOSIS — D63.8 ANEMIA IN OTHER CHRONIC DISEASES CLASSIFIED ELSEWHERE: ICD-10-CM

## 2024-04-23 DIAGNOSIS — I10 PRIMARY HYPERTENSION: ICD-10-CM

## 2024-04-23 DIAGNOSIS — Z12.11 ENCOUNTER FOR SCREENING COLONOSCOPY: ICD-10-CM

## 2024-04-23 LAB
ALBUMIN SERPL BCP-MCNC: 4.2 G/DL (ref 3.4–5)
ALP SERPL-CCNC: 87 U/L (ref 33–136)
ALT SERPL W P-5'-P-CCNC: 31 U/L (ref 10–52)
ANION GAP SERPL CALC-SCNC: 9 MMOL/L (ref 10–20)
APPEARANCE UR: CLEAR
AST SERPL W P-5'-P-CCNC: 27 U/L (ref 9–39)
BILIRUB SERPL-MCNC: 0.5 MG/DL (ref 0–1.2)
BILIRUB UR STRIP.AUTO-MCNC: NEGATIVE MG/DL
BUN SERPL-MCNC: 23 MG/DL (ref 6–23)
CALCIUM SERPL-MCNC: 9 MG/DL (ref 8.6–10.6)
CHLORIDE SERPL-SCNC: 103 MMOL/L (ref 98–107)
CHOLEST SERPL-MCNC: 164 MG/DL (ref 0–199)
CHOLESTEROL/HDL RATIO: 2.2
CO2 SERPL-SCNC: 31 MMOL/L (ref 21–32)
COLOR UR: YELLOW
CREAT SERPL-MCNC: 1.16 MG/DL (ref 0.5–1.3)
EGFRCR SERPLBLD CKD-EPI 2021: 70 ML/MIN/1.73M*2
ERYTHROCYTE [DISTWIDTH] IN BLOOD BY AUTOMATED COUNT: 14.6 % (ref 11.5–14.5)
GLUCOSE SERPL-MCNC: 90 MG/DL (ref 74–99)
GLUCOSE UR STRIP.AUTO-MCNC: NORMAL MG/DL
HCT VFR BLD AUTO: 42.8 % (ref 41–52)
HDLC SERPL-MCNC: 74.4 MG/DL
HGB BLD-MCNC: 13.5 G/DL (ref 13.5–17.5)
KETONES UR STRIP.AUTO-MCNC: NEGATIVE MG/DL
LDLC SERPL CALC-MCNC: 70 MG/DL
LEUKOCYTE ESTERASE UR QL STRIP.AUTO: NEGATIVE
MCH RBC QN AUTO: 31.8 PG (ref 26–34)
MCHC RBC AUTO-ENTMCNC: 31.5 G/DL (ref 32–36)
MCV RBC AUTO: 101 FL (ref 80–100)
MUCOUS THREADS #/AREA URNS AUTO: NORMAL /LPF
NITRITE UR QL STRIP.AUTO: NEGATIVE
NON HDL CHOLESTEROL: 90 MG/DL (ref 0–149)
NRBC BLD-RTO: 0 /100 WBCS (ref 0–0)
PH UR STRIP.AUTO: 6 [PH]
PLATELET # BLD AUTO: 214 X10*3/UL (ref 150–450)
POTASSIUM SERPL-SCNC: 4.4 MMOL/L (ref 3.5–5.3)
PROT SERPL-MCNC: 6.8 G/DL (ref 6.4–8.2)
PROT UR STRIP.AUTO-MCNC: ABNORMAL MG/DL
RBC # BLD AUTO: 4.25 X10*6/UL (ref 4.5–5.9)
RBC # UR STRIP.AUTO: NEGATIVE /UL
RBC #/AREA URNS AUTO: NORMAL /HPF
SODIUM SERPL-SCNC: 139 MMOL/L (ref 136–145)
SP GR UR STRIP.AUTO: 1.03
SQUAMOUS #/AREA URNS AUTO: NORMAL /HPF
TRIGL SERPL-MCNC: 100 MG/DL (ref 0–149)
UROBILINOGEN UR STRIP.AUTO-MCNC: NORMAL MG/DL
VLDL: 20 MG/DL (ref 0–40)
WBC # BLD AUTO: 4.8 X10*3/UL (ref 4.4–11.3)
WBC #/AREA URNS AUTO: NORMAL /HPF

## 2024-04-23 PROCEDURE — 80053 COMPREHEN METABOLIC PANEL: CPT

## 2024-04-23 PROCEDURE — 80061 LIPID PANEL: CPT

## 2024-04-23 PROCEDURE — 84443 ASSAY THYROID STIM HORMONE: CPT

## 2024-04-23 PROCEDURE — 85027 COMPLETE CBC AUTOMATED: CPT

## 2024-04-23 PROCEDURE — 81001 URINALYSIS AUTO W/SCOPE: CPT

## 2024-04-23 RX ORDER — FERROUS SULFATE 324(65)MG
1 TABLET, DELAYED RELEASE (ENTERIC COATED) ORAL DAILY
Qty: 90 TABLET | Refills: 0 | Status: SHIPPED | OUTPATIENT
Start: 2024-04-23

## 2024-04-23 RX ORDER — CHOLECALCIFEROL (VITAMIN D3) 125 MCG
125 CAPSULE ORAL DAILY
Qty: 90 CAPSULE | Refills: 0 | Status: SHIPPED | OUTPATIENT
Start: 2024-04-23

## 2024-04-23 RX ORDER — HYDROXYZINE PAMOATE 25 MG/1
25 CAPSULE ORAL 3 TIMES DAILY PRN
Qty: 30 CAPSULE | Refills: 0 | Status: SHIPPED | OUTPATIENT
Start: 2024-04-23 | End: 2024-05-03

## 2024-04-23 RX ORDER — EZETIMIBE 10 MG/1
10 TABLET ORAL DAILY
Qty: 90 TABLET | Refills: 0 | Status: SHIPPED | OUTPATIENT
Start: 2024-04-23

## 2024-04-23 RX ORDER — LOSARTAN POTASSIUM 50 MG/1
50 TABLET ORAL DAILY
Qty: 90 TABLET | Refills: 0 | Status: SHIPPED | OUTPATIENT
Start: 2024-04-23

## 2024-04-23 RX ORDER — DULOXETIN HYDROCHLORIDE 60 MG/1
60 CAPSULE, DELAYED RELEASE ORAL 2 TIMES DAILY
Qty: 180 CAPSULE | Refills: 0 | Status: SHIPPED | OUTPATIENT
Start: 2024-04-23

## 2024-04-23 RX ORDER — ROSUVASTATIN CALCIUM 40 MG/1
40 TABLET, COATED ORAL DAILY
Qty: 90 TABLET | Refills: 0 | Status: SHIPPED | OUTPATIENT
Start: 2024-04-23

## 2024-04-23 RX ORDER — METOPROLOL TARTRATE 50 MG/1
50 TABLET ORAL 2 TIMES DAILY
Qty: 180 TABLET | Refills: 0 | Status: SHIPPED | OUTPATIENT
Start: 2024-04-23

## 2024-04-23 RX ORDER — NALTREXONE HYDROCHLORIDE 50 MG/1
50 TABLET, FILM COATED ORAL DAILY
Qty: 90 TABLET | Refills: 0 | Status: SHIPPED | OUTPATIENT
Start: 2024-04-23

## 2024-04-23 ASSESSMENT — ACTIVITIES OF DAILY LIVING (ADL)
BATHING: INDEPENDENT
DRESSING: INDEPENDENT
MANAGING_FINANCES: INDEPENDENT
TAKING_MEDICATION: INDEPENDENT
GROCERY_SHOPPING: INDEPENDENT
DOING_HOUSEWORK: INDEPENDENT

## 2024-04-23 ASSESSMENT — PATIENT HEALTH QUESTIONNAIRE - PHQ9
2. FEELING DOWN, DEPRESSED OR HOPELESS: NOT AT ALL
1. LITTLE INTEREST OR PLEASURE IN DOING THINGS: NOT AT ALL
SUM OF ALL RESPONSES TO PHQ9 QUESTIONS 1 AND 2: 0

## 2024-04-23 ASSESSMENT — ENCOUNTER SYMPTOMS
LOSS OF SENSATION IN FEET: 0
DEPRESSION: 0
OCCASIONAL FEELINGS OF UNSTEADINESS: 0

## 2024-04-24 DIAGNOSIS — Z12.11 SPECIAL SCREENING FOR MALIGNANT NEOPLASMS, COLON: ICD-10-CM

## 2024-04-24 LAB — TSH SERPL-ACNC: 1.61 MIU/L (ref 0.44–3.98)

## 2024-04-24 RX ORDER — POLYETHYLENE GLYCOL 3350, SODIUM SULFATE ANHYDROUS, SODIUM BICARBONATE, SODIUM CHLORIDE, POTASSIUM CHLORIDE 236; 22.74; 6.74; 5.86; 2.97 G/4L; G/4L; G/4L; G/4L; G/4L
POWDER, FOR SOLUTION ORAL
Qty: 4000 ML | Refills: 0 | Status: SHIPPED | OUTPATIENT
Start: 2024-04-24

## 2024-04-24 NOTE — PROGRESS NOTES
"Subjective   Patient ID: Dilshad Freeman is a 63 y.o. male who presents for Follow-up (other conditions).    Mr. Dilshad Freeman today came here for:  1. Medicare Wellness Visit.  2. Hypertension, blood pressure.  3. Follow-up on other conditions.  He wants refill.  4. He wants immunization complete.    I have personally reviewed the patient's Past Medical History, Medications, Allergies, Social History, and Family History in the EMR.    IMMUNIZATION: He needs Prevnar 20 shots.    Review of Systems   All other systems reviewed and are negative.  The patient never had a stroke.  No diabetes.  No heart attack.     Objective   BP (!) 164/102   Ht 1.727 m (5' 8\")   Wt 108 kg (238 lb)   BMI 36.19 kg/m²     Physical Exam  Vitals reviewed.   HENT:      Right Ear: Tympanic membrane, ear canal and external ear normal.      Left Ear: Tympanic membrane, ear canal and external ear normal.   Eyes:      General: No scleral icterus.     Pupils: Pupils are equal, round, and reactive to light.   Neck:      Vascular: No carotid bruit.   Cardiovascular:      Heart sounds: Normal heart sounds, S1 normal and S2 normal. No murmur heard.     No friction rub.   Pulmonary:      Effort: Pulmonary effort is normal.      Breath sounds: Normal breath sounds and air entry.   Abdominal:      Palpations: There is no hepatomegaly, splenomegaly or mass.   Genitourinary:     Prostate: Normal.   Musculoskeletal:         General: No swelling or deformity. Normal range of motion.      Cervical back: Neck supple.      Right lower leg: No edema.      Left lower leg: No edema.   Lymphadenopathy:      Cervical: No cervical adenopathy.      Upper Body:      Right upper body: No axillary adenopathy.      Left upper body: No axillary adenopathy.      Lower Body: No right inguinal adenopathy. No left inguinal adenopathy.   Neurological:      Mental Status: He is oriented to person, place, and time.      Cranial Nerves: Cranial nerves 2-12 are intact. No " cranial nerve deficit.      Sensory: No sensory deficit.      Motor: Motor function is intact. No weakness.      Gait: Gait is intact.      Deep Tendon Reflexes: Reflexes normal.   Psychiatric:         Mood and Affect: Mood normal. Mood is not anxious or depressed. Affect is not angry.         Behavior: Behavior is not agitated.         Thought Content: Thought content normal.         Judgment: Judgment normal.     LAB WORK: EKG done, abnormal.    Assessment/Plan   Problem List Items Addressed This Visit             ICD-10-CM       Cardiac and Vasculature    Hypercholesterolemia E78.00    Relevant Medications    rosuvastatin (Crestor) 40 mg tablet    Hypertension I10    Relevant Medications    losartan (Cozaar) 50 mg tablet    metoprolol tartrate (Lopressor) 50 mg tablet    Other Relevant Orders    ECG 12 Lead       Endocrine/Metabolic    Vitamin D deficiency E55.9    Relevant Medications    cholecalciferol (Vitamin D-3) 125 MCG (5000 UT) capsule       Hematology and Neoplasia    Anemia D64.9    Relevant Medications    ferrous sulfate 324 mg (65 mg elemental iron) EC tablet (delayed release)       Mental Health    Anxiety F41.9    Relevant Medications    DULoxetine (Cymbalta) 60 mg DR capsule    ezetimibe (Zetia) 10 mg tablet    Depression F32.A    Relevant Medications    DULoxetine (Cymbalta) 60 mg DR capsule    ezetimibe (Zetia) 10 mg tablet     Other Visit Diagnoses         Codes    Encounter for Medicare annual wellness exam     Z00.00    Relevant Orders    CBC (Completed)    Comprehensive Metabolic Panel (Completed)    Lipid Panel (Completed)    Thyroid Stimulating Hormone (Completed)    Urinalysis with Reflex Microscopic (Completed)    Encounter for screening colonoscopy     Z12.11    Relevant Orders    Colonoscopy Screening; Average Risk Patient    Other fatigue     R53.83    Relevant Orders    CBC (Completed)    Diarrhea, unspecified type     R19.7    Relevant Medications    hydrOXYzine pamoate (VistariL) 25  mg capsule    Alcohol withdrawal syndrome with complication (Multi)     F10.939    Relevant Medications    naltrexone (Depade) 50 mg tablet    Need for pneumococcal vaccination     Z23    Relevant Orders    Pneumococcal conjugate vaccine, 20-valent (PREVNAR 20) (Completed)        1. Medicare Wellness Visit done.  2. The patient is full code.  3. The patient’s sister is legal power of .  4. The patient is not depressed.  He is not suicidal.  5. Hypertension, ______ okay.  6. Cholesterol, stable.  7. Prostate cancer.  Monitor.  8. Vision and hearing okay.  9. Colon polyp and colon cancer.  He is overdue for his colonoscopy.  I am going to order it.  10. EKG performed.  11. Blood work ordered today.  12. I shall see him back in a week after testing.    Scribe Attestation  By signing my name below, IKirsty Scribe attest that this documentation has been prepared under the direction and in the presence of Yahaira Alcantar MD.

## 2024-05-06 ENCOUNTER — OFFICE VISIT (OUTPATIENT)
Dept: PRIMARY CARE | Facility: CLINIC | Age: 64
End: 2024-05-06
Payer: COMMERCIAL

## 2024-05-06 VITALS
HEIGHT: 68 IN | BODY MASS INDEX: 36.98 KG/M2 | WEIGHT: 244 LBS | SYSTOLIC BLOOD PRESSURE: 130 MMHG | DIASTOLIC BLOOD PRESSURE: 72 MMHG

## 2024-05-06 DIAGNOSIS — Z13.29 THYROID DISORDER SCREENING: Primary | ICD-10-CM

## 2024-05-06 DIAGNOSIS — F32.A ANXIETY AND DEPRESSION: ICD-10-CM

## 2024-05-06 DIAGNOSIS — M79.2 NEUROPATHIC PAIN: ICD-10-CM

## 2024-05-06 DIAGNOSIS — F41.9 ANXIETY AND DEPRESSION: ICD-10-CM

## 2024-05-06 DIAGNOSIS — I10 PRIMARY HYPERTENSION: ICD-10-CM

## 2024-05-06 DIAGNOSIS — E78.00 HYPERCHOLESTEROLEMIA: ICD-10-CM

## 2024-05-06 DIAGNOSIS — R10.84 GENERALIZED ABDOMINAL PAIN: ICD-10-CM

## 2024-05-06 DIAGNOSIS — M54.9 BACK PAIN, UNSPECIFIED BACK LOCATION, UNSPECIFIED BACK PAIN LATERALITY, UNSPECIFIED CHRONICITY: ICD-10-CM

## 2024-05-06 DIAGNOSIS — F10.939 ALCOHOL WITHDRAWAL SYNDROME WITH COMPLICATION (MULTI): ICD-10-CM

## 2024-05-06 RX ORDER — CLONIDINE HYDROCHLORIDE 0.1 MG/1
0.1 TABLET ORAL 3 TIMES DAILY
Qty: 270 TABLET | Refills: 1 | Status: SHIPPED | OUTPATIENT
Start: 2024-05-06

## 2024-05-06 RX ORDER — GABAPENTIN 400 MG/1
400 CAPSULE ORAL 3 TIMES DAILY
Qty: 90 CAPSULE | Refills: 0 | Status: SHIPPED | OUTPATIENT
Start: 2024-05-06 | End: 2024-06-07

## 2024-05-06 ASSESSMENT — ENCOUNTER SYMPTOMS
OCCASIONAL FEELINGS OF UNSTEADINESS: 0
LOSS OF SENSATION IN FEET: 0
DEPRESSION: 0

## 2024-05-07 NOTE — PROGRESS NOTES
"Subjective   Patient ID: Dilshad Freeman is a 63 y.o. male who presents for multiple medical issues..    Mr. Freeman today came here for multiple medical issues.  Overall, he is a happy person.  Appetite and weight are okay.  No chest pain.  Taking medications regularly.  He had colonoscopy in September.  He is doing okay.  He just finished alcohol rehab and did very well. medications helping.  He had a blood work taken.  He brought medications for review.    I have personally reviewed the patient's Past Medical History, Medications, Allergies, Social History, and Family History in the EMR.    Review of Systems   All other systems reviewed and are negative.    Objective   /72   Ht 1.727 m (5' 8\")   Wt 111 kg (244 lb)   BMI 37.10 kg/m²     Physical Exam  Vitals reviewed.   Cardiovascular:      Heart sounds: Normal heart sounds, S1 normal and S2 normal. No murmur heard.     No friction rub.   Pulmonary:      Effort: Pulmonary effort is normal.      Breath sounds: Normal breath sounds and air entry.   Abdominal:      Palpations: There is no hepatomegaly, splenomegaly or mass.   Musculoskeletal:      Right lower leg: No edema.      Left lower leg: No edema.   Lymphadenopathy:      Lower Body: No right inguinal adenopathy. No left inguinal adenopathy.   Neurological:      Cranial Nerves: Cranial nerves 2-12 are intact.      Sensory: No sensory deficit.      Motor: Motor function is intact.      Deep Tendon Reflexes: Reflexes are normal and symmetric.     LAB WORK: Laboratory testing discussed.    Assessment/Plan   Problem List Items Addressed This Visit             ICD-10-CM       Cardiac and Vasculature    Hypercholesterolemia E78.00    Relevant Orders    Comprehensive metabolic panel    Hypertension I10    Relevant Medications    cloNIDine (Catapres) 0.1 mg tablet    Other Relevant Orders    CBC    Ammonia       Gastrointestinal and Abdominal    Abdominal pain R10.9    Relevant Medications    gabapentin " (Neurontin) 400 mg capsule     Other Visit Diagnoses         Codes    Thyroid disorder screening    -  Primary Z13.29    Anxiety and depression     F41.9, F32.A    Back pain, unspecified back location, unspecified back pain laterality, unspecified chronicity     M54.9    Alcohol withdrawal syndrome with complication (Multi)     F10.939    Neuropathic pain     M79.2        1. Hypertension, okay.  2. High cholesterol, stable.  3. Thyroid, okay.  4. Anxiety and depression, doing good.  5. Back pain, stable.  6. Alcohol withdrawal.  Taking naltrexone from his psychiatrist.  I am giving.  The patient is benefiting. Continue with that.  7. Neuropathic pain, gabapentin.  8. Medications reviewed.  Liver, kidney okay.  9. Depression, okay.  10. I plan to see him back in about a couple of months.  11. Reviewed the medications.  I told him cut down vitamin tablets in half, every other day.    Scribe Attestation  By signing my name below, I, George Sibley attest that this documentation has been prepared under the direction and in the presence of Yahaira Alcantar MD.

## 2024-05-14 PROCEDURE — RXMED WILLOW AMBULATORY MEDICATION CHARGE

## 2024-05-15 ENCOUNTER — PHARMACY VISIT (OUTPATIENT)
Dept: PHARMACY | Facility: CLINIC | Age: 64
End: 2024-05-15
Payer: MEDICARE

## 2024-06-12 ENCOUNTER — APPOINTMENT (OUTPATIENT)
Dept: PHARMACY | Facility: HOSPITAL | Age: 64
End: 2024-06-12
Payer: COMMERCIAL

## 2024-06-12 DIAGNOSIS — I48.0 PAROXYSMAL ATRIAL FIBRILLATION (MULTI): Primary | ICD-10-CM

## 2024-06-12 NOTE — PROGRESS NOTES
"Pharmacist Clinic: Anticoagulation Management  Dilshad Freeman was referred to the Clinical Pharmacy Team for their anticoagulation management.    Referring Provider:  Stephanie Sanchez APRN*    Last Appointment w/ Pharmacist: 04/10/24  Pharmacist Name: Marah Kaur, PharmD  _______________________________________________________________________    PHARMACY ASSESSMENT    Review of Past Appointment:  - Overall, patient is doing very well on anticoagulation therapy. No side effects, hospitalizations, or falls to report.   - Medication reconciliation performed. Discussed need for follow up with cardiologist at least once yearly to continue with our program. At the time, pt had no cardiology appt scheduled.      RELEVANT LAB RESULTS    Lab Results   Component Value Date    BILITOT 0.5 04/23/2024    CALCIUM 9.0 04/23/2024    CO2 31 04/23/2024     04/23/2024    CREATININE 1.16 04/23/2024    GLUCOSE 90 04/23/2024    ALKPHOS 87 04/23/2024    K 4.4 04/23/2024    PROT 6.8 04/23/2024     04/23/2024    AST 27 04/23/2024    ALT 31 04/23/2024    BUN 23 04/23/2024    ANIONGAP 9 (L) 04/23/2024    MG 1.83 11/02/2022    PHOS 2.3 (L) 01/25/2023     07/20/2020    ALBUMIN 4.2 04/23/2024    AMYLASE 46 02/01/2024    LIPASE 15 02/01/2024    GFRMALE 41 (A) 09/26/2023     Lab Results   Component Value Date    TRIG 100 04/23/2024    CHOL 164 04/23/2024    LDLCALC 70 04/23/2024    HDL 74.4 04/23/2024     No results found for: \"BMCBC\", \"CBCDIF\"   _______________________________________________________________________    ANTICOAGULATION ASSESSMENT    RISK:    10-Year ASCVD Risk Score  Appropriate? No - current ASCVD     The 10-year ASCVD risk score (Kia HESTER, et al., 2019) is: 25.8%    Values used to calculate the score:      Age: 64 years      Sex: Male      Is Non- : Yes      Diabetic: Yes      Tobacco smoker: No      Systolic Blood Pressure: 130 mmHg      Is BP treated: Yes      HDL Cholesterol: " 74.4 mg/dL      Total Cholesterol: 164 mg/dL    [id number] Score   Appropriate? Yes - dx of afib    KIV6YO2-IXCO Score: 3   Age 65-74 (+1)   Sex Male (+0)   CHF hx Yes (+1)   Hypertension hx Yes (+1)   Stroke/TIA/thromboembolism hx No (+0)   Vascular disease hx   (prior MI, peripheral artery disease, aortic plaque) No (+0)   Diabetes hx No (+0)       DIAGNOSIS:     Indication: prevention of nonvalvular atrial fibrilliation stroke and systemic embolism  Duration: Patient is projected to be on anticoagulation indefinitely (risk vs benefit)      CURRENT PHARMACOTHERAPY:     - Eliquis 5 mg PO BID   Dose Evaluation: appropriate    SCr (mg/dL) - 1.16   Weight (kg) - 111   Age (years) - 64       UPDATE ON PHARMACOTHERAPY:    Affordability/Accessibility: enrolled in [facility] PAP  Adherence/Organization: denies any missed doses  Adverse Effects: denies     Recent Hospitalizations: none reported  Recent Falls/Trauma: none reported  Changes in Tobacco or Alcohol Intake:    - Tobacco: former, no change   - Alcohol: none, no change    _______________________________________________________________________    DISCUSSION/NOTES:    - Due for [facility] PAP renewal today. Discussed documents/eligibility for renewal.  - Patient has yearly cardiologist visit scheduled for 06/17/24 and endorses understanding yearly appt necessary to remain enrolled in program.   - Overall doing very well on Eliquis therapy and denies any side effects, trauma, falls or hospitalizations. Denies any tobacco or alcohol use.     _______________________________________________________________________    PATIENT EDUCATION/GOALS    - Counseled patient on MOA, expectations, duration of therapy, contraindications, administration, and monitoring parameters  - Counseled patient of side effects that are indicative of bleeding such as dark tarry stool, unexplainable bruising, or vomiting up a coffee ground like substance  - Answered all patient questions and  concerns  _______________________________________________________________________    RECOMMENDATIONS/PLAN    Anticoagulation - continue current treatment regimen.  Eliquis 5 mg PO BID    Affordability/Accessibility - RENEWAL. Patient expected to be eligible for this program. Denies any changes to provider, insurance, home address, household. Patient has yearly cardiologist visit scheduled for 06/17/24. Patient does not file taxes and only collects Social Security as form of income. Patient will be faxing documents directly to myself at [phone].    Follow Up  Next Cardiology Appointment: 6/17/24  Clinical Pharmacist follow up: 3 months , Virtual  VAF/Application Expiration: Yes    Date: 7/10/24      CAMILLE LOMELI PharmD    Verbal consent to manage patient's drug therapy was obtained from the patient . They were informed they may decline to participate or withdraw from participation in pharmacy services at any time.    Continue all meds under the continuation of care with the referring provider and clinical pharmacy team.

## 2024-06-12 NOTE — Clinical Note
Hi Mayra! - Due for [facility] PAP renewal today. Discussed documents/eligibility for renewal. - Patient has yearly cardiologist visit scheduled for 06/17/24 and endorses understanding yearly appt necessary to remain enrolled in program.  - Overall doing very well on Eliquis therapy and denies any side effects, trauma, falls or hospitalizations. Denies any tobacco or alcohol use.

## 2024-06-14 PROBLEM — R53.83 FATIGUE: Status: ACTIVE | Noted: 2024-04-23

## 2024-06-14 PROBLEM — R19.7 DIARRHEA: Status: ACTIVE | Noted: 2024-06-14

## 2024-07-10 ENCOUNTER — APPOINTMENT (OUTPATIENT)
Dept: PRIMARY CARE | Facility: CLINIC | Age: 64
End: 2024-07-10
Payer: COMMERCIAL

## 2024-07-10 VITALS
WEIGHT: 255 LBS | DIASTOLIC BLOOD PRESSURE: 66 MMHG | SYSTOLIC BLOOD PRESSURE: 120 MMHG | HEIGHT: 68 IN | BODY MASS INDEX: 38.65 KG/M2

## 2024-07-10 DIAGNOSIS — M54.9 BACK PAIN, UNSPECIFIED BACK LOCATION, UNSPECIFIED BACK PAIN LATERALITY, UNSPECIFIED CHRONICITY: ICD-10-CM

## 2024-07-10 DIAGNOSIS — I10 PRIMARY HYPERTENSION: ICD-10-CM

## 2024-07-10 DIAGNOSIS — G47.30 SLEEP APNEA, UNSPECIFIED TYPE: Primary | ICD-10-CM

## 2024-07-10 DIAGNOSIS — E78.00 HYPERCHOLESTEROLEMIA: ICD-10-CM

## 2024-07-11 NOTE — PROGRESS NOTES
"Subjective   Patient ID: Dilshad Freeman is a 64 y.o. male who presents for Follow-up.    This gentleman today came here for follow-up.  Overall he is okay.  He has a sleep apnea.  He needs machine, mask.  He is ______ weak, tired, fatigued, exhausted.  Feeling okay.  No problem.  He came for follow-up.    I have personally reviewed the patient's Past Medical History, Medications, Allergies, Social History, and Family History in the EMR.    Review of Systems   All other systems reviewed and are negative.    Objective   /66   Ht 1.727 m (5' 8\")   Wt 116 kg (255 lb)   BMI 38.77 kg/m²     Physical Exam  Vitals reviewed.   Cardiovascular:      Heart sounds: Normal heart sounds, S1 normal and S2 normal. No murmur heard.     No friction rub.   Pulmonary:      Effort: Pulmonary effort is normal.      Breath sounds: Normal breath sounds and air entry.   Abdominal:      Palpations: There is no hepatomegaly, splenomegaly or mass.   Musculoskeletal:      Right lower leg: No edema.      Left lower leg: No edema.   Lymphadenopathy:      Lower Body: No right inguinal adenopathy. No left inguinal adenopathy.   Neurological:      Cranial Nerves: Cranial nerves 2-12 are intact.      Sensory: No sensory deficit.      Motor: Motor function is intact.      Deep Tendon Reflexes: Reflexes are normal and symmetric.     LAB WORK: Laboratory testing discussed.    Assessment/Plan   Problem List Items Addressed This Visit             ICD-10-CM       Cardiac and Vasculature    Hypercholesterolemia E78.00    Hypertension I10     Other Visit Diagnoses         Codes    Sleep apnea, unspecified type    -  Primary G47.30    Back pain, unspecified back location, unspecified back pain laterality, unspecified chronicity     M54.9        1. Sleep apnea.  Ordered CPAP machine, he needs it.  2. Hypertension, okay.  3. High cholesterol, stable.  4. Back pain, stable.  5. I shall see him back in routine checkup.  Happy to see him anytime sooner if " necessary.    Scribe Attestation  By signing my name below, I, George Sibley attest that this documentation has been prepared under the direction and in the presence of Yahaira Alcantar MD.

## 2024-07-22 ENCOUNTER — TELEPHONE (OUTPATIENT)
Dept: PHARMACY | Facility: HOSPITAL | Age: 64
End: 2024-07-22
Payer: COMMERCIAL

## 2024-07-22 PROCEDURE — RXMED WILLOW AMBULATORY MEDICATION CHARGE

## 2024-07-22 NOTE — TELEPHONE ENCOUNTER
Patient Assistance Program Approval:     We are pleased to inform you that your application for assistance has been approved.     This approval is valid through  7/22/2025  as long as the following criteria continue to be satisfied:     Your medication (Eliquis) remains covered under your current insurance plan.   Your prescriber does not discontinue therapy.   You do not seek reimbursement from any other private or government-funded programs for the  medication.    Under this program, the pharmacy will first bill your insurance plan for your indemnified specified medication. The [employer] Fund will then offset your copay balance, so that your out-of pocket expense for your specialty medication will be $0.00.    Marah Kaur, PharmD

## 2024-07-24 ENCOUNTER — PHARMACY VISIT (OUTPATIENT)
Dept: PHARMACY | Facility: CLINIC | Age: 64
End: 2024-07-24
Payer: MEDICARE

## 2024-08-21 DIAGNOSIS — I10 PRIMARY HYPERTENSION: ICD-10-CM

## 2024-08-21 DIAGNOSIS — E78.00 HYPERCHOLESTEROLEMIA: ICD-10-CM

## 2024-08-21 RX ORDER — ROSUVASTATIN CALCIUM 40 MG/1
40 TABLET, COATED ORAL DAILY
Qty: 90 TABLET | Refills: 0 | Status: SHIPPED | OUTPATIENT
Start: 2024-08-21

## 2024-08-21 RX ORDER — LOSARTAN POTASSIUM 50 MG/1
50 TABLET ORAL DAILY
Qty: 90 TABLET | Refills: 0 | Status: SHIPPED | OUTPATIENT
Start: 2024-08-21

## 2024-09-11 ENCOUNTER — APPOINTMENT (OUTPATIENT)
Dept: PHARMACY | Facility: HOSPITAL | Age: 64
End: 2024-09-11
Payer: COMMERCIAL

## 2024-09-11 DIAGNOSIS — I48.0 PAROXYSMAL ATRIAL FIBRILLATION (MULTI): Primary | ICD-10-CM

## 2024-09-11 NOTE — PROGRESS NOTES
Pharmacist Clinic: Cardiology Management    Dilshad Freeman is a 64 y.o. male was referred to Clinical Pharmacy Team for anticoagulation management.     Referring Provider: Stephanie Sanchez APRN*    THIS IS A FOLLOW UP PATIENT APPOINTMENT. AT LAST VISIT ON 6/12/2024 WITH PHARMACIST (Verna Mcpherson).    Appointment was completed by the patient who was reached at .    REVIEW OF PAST APPNT (IF APPLICABLE):   Due for [facility] PAP renewal today. Discussed documents/eligibility for renewal. Patient subsequently re-enrolled through 7/22/2025  Patient has yearly cardiologist visit scheduled for 06/17/24 and endorses understanding yearly appt necessary to remain enrolled in program. Of note, patient did not keep this appointment. Last appointment with cardiology office was 9/29/2023. Will remind patient of annual visit requirement.   Overall doing very well on Eliquis therapy and denies any side effects, trauma, falls or hospitalizations. Denies any tobacco or alcohol use.       No Known Allergies    Past Medical History:   Diagnosis Date    Anxiety     Atrial fibrillation (Multi)     Body mass index (BMI)40.0-44.9, adult 04/26/2021    BMI 40.0-44.9, adult    BPH (benign prostatic hyperplasia)     Depression     H/O knee surgery     High cholesterol     History of back surgery     HX: anticoagulation     Hypertension     Personal history of alcoholism (Multi)     Prostate cancer (Multi)     Stress        Current Outpatient Medications on File Prior to Visit   Medication Sig Dispense Refill    amiodarone (Pacerone) 200 mg tablet TAKE 1 TABLET DAILY 90 tablet 0    apixaban (Eliquis) 5 mg tablet TAKE 1 TABLET BY MOUTH EVERY 12 HOURS 180 tablet 3    aspirin 81 mg chewable tablet Chew.      cholecalciferol (Vitamin D-3) 125 MCG (5000 UT) capsule Take 1 capsule (125 mcg) by mouth once daily. 90 capsule 0    cloNIDine (Catapres) 0.1 mg tablet Take 1 tablet (0.1 mg) by mouth 3 times a day. 270 tablet 1    DULoxetine  (Cymbalta) 60 mg DR capsule Take 1 capsule (60 mg) by mouth 2 times a day. 180 capsule 0    escitalopram (Lexapro) 10 mg tablet 1/2 TAB DAILY X 1 WEEK THEN 1 TAB DAILY X 1 WEEK THEN 1.5 TABS DAILY      ezetimibe (Zetia) 10 mg tablet TAKE 1 TABLET BY MOUTH EVERY DAY 90 tablet 0    ferrous sulfate 324 mg (65 mg elemental iron) EC tablet (delayed release) TAKE 1 TABLET (65 MG) BY MOUTH ONCE DAILY. 90 tablet 0    gabapentin (Neurontin) 400 mg capsule TAKE 1 CAPSULE (400 MG) BY MOUTH 3 TIMES A DAY. 90 capsule 0    hydrOXYzine HCL (Atarax) 25 mg tablet 1-2 TABS DAILY AS NEEDED FOR ANXIETY      hydrOXYzine pamoate (VistariL) 25 mg capsule Take 1 capsule (25 mg) by mouth 3 times a day as needed for itching for up to 10 days. 30 capsule 0    losartan (Cozaar) 50 mg tablet Take 1 tablet (50 mg) by mouth once daily. 90 tablet 0    metoprolol tartrate (Lopressor) 50 mg tablet TAKE 1 TABLET BY MOUTH TWICE A  tablet 0    mirtazapine (Remeron) 45 mg tablet Take 1 tablet (45 mg) by mouth once daily at bedtime. 90 tablet 0    naltrexone (Depade) 50 mg tablet Take 1 tablet (50 mg) by mouth once daily. 90 tablet 0    polyethylene glycol-electrolytes (Golytely) 420 gram solution Begin drinking first half of prep 6pm the night before procedure. Start second half 5 hours before procedure time. 4000 mL 0    rosuvastatin (Crestor) 40 mg tablet Take 1 tablet (40 mg) by mouth once daily. 90 tablet 0    solifenacin (VESIcare) 10 mg tablet Take 1 tablet (10 mg) by mouth once daily.       No current facility-administered medications on file prior to visit.         RELEVANT LAB RESULTS  Lab Results   Component Value Date    BILITOT 0.5 04/23/2024    CALCIUM 9.0 04/23/2024    CO2 31 04/23/2024     04/23/2024    CREATININE 1.16 04/23/2024    GLUCOSE 90 04/23/2024    ALKPHOS 87 04/23/2024    K 4.4 04/23/2024    PROT 6.8 04/23/2024     04/23/2024    AST 27 04/23/2024    ALT 31 04/23/2024    BUN 23 04/23/2024    ANIONGAP 9 (L)  "04/23/2024    MG 1.83 11/02/2022    PHOS 2.3 (L) 01/25/2023     07/20/2020    ALBUMIN 4.2 04/23/2024    AMYLASE 46 02/01/2024    LIPASE 15 02/01/2024    GFRMALE 41 (A) 09/26/2023     Lab Results   Component Value Date    TRIG 100 04/23/2024    CHOL 164 04/23/2024    LDLCALC 70 04/23/2024    HDL 74.4 04/23/2024     No results found for: \"BMCBC\", \"CBCDIF\"     PHARMACEUTICAL ASSESSMENT    Drug Interactions? Yes, describe: Concurrent use of amiodarone and escitalopram may lead to QTC prolongation. Last EKG on file 6/15/2022, QT interval of 456, recommended for updated monitoring.     Medication Documentation Review Audit       Reviewed by Anat Ledesma MA (Medical Assistant) on 07/10/24 at 1220      Medication Order Taking? Sig Documenting Provider Last Dose Status   amiodarone (Pacerone) 200 mg tablet [id number]  TAKE 1 TABLET DAILY Yahaira Alcantar MD  Active   apixaban (Eliquis) 5 mg tablet [id number]  TAKE 1 TABLET BY MOUTH EVERY 12 HOURS Stephanie Sanchez, APRN-CNP  Active   aspirin 81 mg chewable tablet [id number]  Chew. Historical Provider, MD  Active   cholecalciferol (Vitamin D-3) 125 MCG (5000 UT) capsule [id number]  Take 1 capsule (125 mcg) by mouth once daily. Yahaira Alcantar MD  Active   cloNIDine (Catapres) 0.1 mg tablet [id number]  Take 1 tablet (0.1 mg) by mouth 3 times a day. Yahaira Alcantar MD  Active   DULoxetine (Cymbalta) 60 mg DR capsule [id number]  Take 1 capsule (60 mg) by mouth 2 times a day. Yahaira Alcantar MD  Active   escitalopram (Lexapro) 10 mg tablet [id number]  1/2 TAB DAILY X 1 WEEK THEN 1 TAB DAILY X 1 WEEK THEN 1.5 TABS DAILY Historical Provider, MD  Active   ezetimibe (Zetia) 10 mg tablet [id number]  Take 1 tablet (10 mg) by mouth once daily. Yahaira Alcantar MD  Active   ferrous sulfate 324 mg (65 mg elemental iron) EC tablet (delayed release) [id number]  Take 1 tablet (65 mg) by mouth once daily. Yahaira Alcantar MD  Active   gabapentin (Neurontin) 400 mg capsule [id number]  TAKE 1 " CAPSULE (400 MG) BY MOUTH 3 TIMES A DAY. Yahaira Alcantar MD  Active   hydrOXYzine HCL (Atarax) 25 mg tablet [id number]  1-2 TABS DAILY AS NEEDED FOR ANXIETY Historical Provider, MD  Active   hydrOXYzine pamoate (VistariL) 25 mg capsule [id number]  Take 1 capsule (25 mg) by mouth 3 times a day as needed for itching for up to 10 days. Yahaira Alcantar MD   24 2359   losartan (Cozaar) 50 mg tablet [id number]  Take 1 tablet (50 mg) by mouth once daily. Yahaira Alcantar MD  Active   metoprolol tartrate (Lopressor) 50 mg tablet [id number]  Take 1 tablet by mouth 2 times a day. Yahaira Alcantar MD  Active   mirtazapine (Remeron) 45 mg tablet [id number]  Take 1 tablet (45 mg) by mouth once daily at bedtime. Yahaira Alcantar MD   04/10/24 235   naltrexone (Depade) 50 mg tablet [id number]  Take 1 tablet (50 mg) by mouth once daily. Yahaira Alcantar MD  Active   polyethylene glycol-electrolytes (Golytely) 420 gram solution [id number]  Begin drinking first half of prep 6pm the night before procedure. Start second half 5 hours before procedure time. Fide Shields MD  Active   rosuvastatin (Crestor) 40 mg tablet [id number]  Take 1 tablet (40 mg) by mouth once daily. Yahaira Alcantar MD  Active   solifenacin (VESIcare) 10 mg tablet [id number]  Take 1 tablet (10 mg) by mouth once daily. Historical Provider, MD  Active                    DISEASE MANAGEMENT ASSESSMENT:     ANTICOAGULATION ASSESSMENT    The 10-year ASCVD risk score (Kia HESTER, et al., 2019) is: 22.6%    Values used to calculate the score:      Age: 64 years      Sex: Male      Is Non- : Yes      Diabetic: Yes      Tobacco smoker: No      Systolic Blood Pressure: 120 mmHg      Is BP treated: Yes      HDL Cholesterol: 74.4 mg/dL      Total Cholesterol: 164 mg/dL    DIAGNOSIS: prevention of nonvalvular atrial fibrilliation stroke and systemic embolism  - Patient is projected to be on anticoagulation indefinitely (risk vs benefit)  -  [id number] Score: [3] (only included if diagnosis is atrial fibrillation)   Age: [<65 (0)] [65-74 (+1)] [> 75 (+2)]: 1  Sex: [Male/Female (+1)]: 0  CHF history: [No/Yes(+1)]: 1  Hypertension history: [No/Yes(+1)]: 1  Stroke/TIA/thromboembolism history: [No/Yes(+2)]: 0  Vascular disease history (prior MI, peripheral artery disease, aortic plaque): [No/Yes(+1)]: 0  Diabetes history: [No/Yes(+1)]: 0 - prediabetes    CURRENT PHARMACOTHERAPY:   Eliquis 5 mg BID  63 y/o  116 kg  Scr 1.16 mg/dL    RELEVANT PAST MEDICAL HISTORY:   A-fib, HTN, CHF, DLD, CAD, prediabetes, Hx of alcohol abuse    Affordability/Accessibility:  PAP for Eliquis - active through 7/22/2025  Adherence/Organization: confirms taking twice daily; denies any missed doses and uses medication box for organization.  Adverse Reactions: none; patient denies any bruising/bleeding  Recent Hospitalizations: none  Recent Falls/Trauma: none  Changes in Tobacco or Alcohol Intake:   Tobacco: none, patient does not use  Alcohol: none, patient does not use    EDUCATION/COUNSELING:   - Counseled patient on MOA, expectations, duration of therapy, contraindications, administration, and monitoring parameters  - Counseled patient of side effects that are indicative of bleeding such as dark tarry stool, unexplainable bruising, or vomiting up a coffee ground like substance      DISCUSSION/NOTES:   Today's appointment was 3 month follow up regarding anticoagulation pharmacotherapy. Patient is doing well, reports adherence and denies any missed doses of Eliquis. Denies any abnormal bruising/bleeding. No hospitalizations or falls to report since last appointment.  Of note, one drug interaction identified. Concurrent use of amiodarone and escitalopram may lead to QTC prolongation. Last EKG on file 6/15/2022, QT interval of 456, recommended for updated monitoring.  Patient's last appointment with referring provider was 9/29/2023. Patient had scheduled appointment for  6/17/2024, but this has been indicated as a no-show appointment. Discussed requirement for  PAP is to be seen annually by referring provider. Patient states he will call the office today to make an appointment. Will also inform referring provider.  Will follow up in 1 month to ensure patient has scheduled or completed follow up.    ASSESSMENT AND PLAN:    Assessment/Plan   Problem List Items Addressed This Visit       Paroxysmal atrial fibrillation (Multi) - Primary     No concerns regarding bruising/bleeding with Eliquis at this time. No dose adjustment needed for age, weight, and renal function. Will continue current dose and follow up in 1 month.         Relevant Orders    Follow Up In Clinical Pharmacy         RECOMMENDATIONS/PLAN    Continue: Eliquis 5 mg BID  Follow up: 1 month    Last Appnt with Referring Provider: 9/29/2023  Next Appnt with Referring Provider: None scheduled at this time  Clinical Pharmacist follow up: 10/16/2024  VAF/Application Expiration: Yes    Date: 7/22/2025  Type of Encounter: Winsome Segura PharmD    Verbal consent to manage patient's drug therapy was obtained from the patient . They were informed they may decline to participate or withdraw from participation in pharmacy services at any time.    Continue all meds under the continuation of care with the referring provider and clinical pharmacy team.

## 2024-09-11 NOTE — Clinical Note
Niko Brown, Today's appointment was 3 month follow up regarding anticoagulation with Eliquis. Patient is doing well, reports adherence and denies any missed doses of Eliquis. Denies any abnormal bruising/bleeding. No hospitalizations or falls to report since last appointment. Of note, one drug interaction identified. Concurrent use of amiodarone and escitalopram may lead to QTC prolongation. Last EKG on file 6/15/2022, QT interval of 456, recommended for updated monitoring. We also discussed that his last appointment with you was 9/29/2023. Discussed requirement for  PAP is to be seen annually by referring provider, otherwise we are not able to continue to follow. Patient states he will call the office today to make an appointment. Will follow up in 1 month to ensure patient has scheduled or completed his follow up.

## 2024-09-11 NOTE — ASSESSMENT & PLAN NOTE
No concerns regarding bruising/bleeding with Eliquis at this time. No dose adjustment needed for age, weight, and renal function. Will continue current dose and follow up in 1 month.

## 2024-09-26 ENCOUNTER — APPOINTMENT (OUTPATIENT)
Dept: HEMATOLOGY/ONCOLOGY | Facility: CLINIC | Age: 64
End: 2024-09-26
Payer: COMMERCIAL

## 2024-10-04 ENCOUNTER — TELEMEDICINE (OUTPATIENT)
Dept: HEMATOLOGY/ONCOLOGY | Facility: CLINIC | Age: 64
End: 2024-10-04
Payer: COMMERCIAL

## 2024-10-04 DIAGNOSIS — Z85.46 HISTORY OF PROSTATE CANCER: ICD-10-CM

## 2024-10-04 DIAGNOSIS — D51.3 OTHER DIETARY VITAMIN B12 DEFICIENCY ANEMIA: Primary | ICD-10-CM

## 2024-10-04 ASSESSMENT — COLUMBIA-SUICIDE SEVERITY RATING SCALE - C-SSRS
6. HAVE YOU EVER DONE ANYTHING, STARTED TO DO ANYTHING, OR PREPARED TO DO ANYTHING TO END YOUR LIFE?: NO
1. IN THE PAST MONTH, HAVE YOU WISHED YOU WERE DEAD OR WISHED YOU COULD GO TO SLEEP AND NOT WAKE UP?: NO
2. HAVE YOU ACTUALLY HAD ANY THOUGHTS OF KILLING YOURSELF?: NO

## 2024-10-04 ASSESSMENT — PATIENT HEALTH QUESTIONNAIRE - PHQ9
1. LITTLE INTEREST OR PLEASURE IN DOING THINGS: NOT AT ALL
2. FEELING DOWN, DEPRESSED OR HOPELESS: NOT AT ALL
SUM OF ALL RESPONSES TO PHQ9 QUESTIONS 1 AND 2: 0

## 2024-10-04 ASSESSMENT — ENCOUNTER SYMPTOMS
LOSS OF SENSATION IN FEET: 0
OCCASIONAL FEELINGS OF UNSTEADINESS: 1
DEPRESSION: 1

## 2024-10-04 NOTE — PROGRESS NOTES
Consent:  Verbal consent was requested and obtained from patient on this date for a telehealth visit.    Patient ID: Dilshad Freeman is a 64 y.o. male.    Subjective    HPI  1. [id number], stage II prostate carcinoma, [place] 5 + 5 = 10.  2. [id number] receiving radiation therapy between March 3, 2016 and May 3, 2016 with 45 Gy in 25 fractions and34.2 in 19 fractions bilaterally using IMRT.  3. He was treated with 32 months of an LHRH agonist completing therapy August 6, 2018.  4. Iron deficiency anemia originally seen by a hematologist back in December of 2020     CURRENT THERAPY: Surveillance.        Chief Complaint: fuv prostate   Interval History:    Patient returns today for history of prostate carcinoma, diagnosed in 2016. Patient completed all therapy in August 6, 2018. He has been on observation since that  time. He was seen in our office in December of 2020 patient was fund to have iron deficiency anemia which was felt to be due to GI blood loss, since resolved. He did undergo EGD and colonoscopy, 1 polyp removed no signs of bleeding identified.    Since our last visit patient underwent laparoscopic vertical gastric sleeve gastrectomy and repair of hiatal hernia on October 19, 2022 with Dr. Shahid Ashraf.   On presentation today he has no complaints he denies any fever, chills or night sweats.  No cough, chest pain or shortness of breath.  No nausea or vomiting.  No constipation or diarrhea.  He has no difficulty with urination. No signs or symptoms of active  bleeding or unusual bruising.   He was seen in the emergency room in August for alcohol withdraw symptoms. He states he has not had drink since Jan 2024.         Objective    BSA: There is no height or weight on file to calculate BSA.  There were no vitals taken for this visit.         Performance Status:  Asymptomatic    Assessment/Plan    Prostate carcinoma:  - Treated on [id number]  - All therapy completed as of August 6, 2018.  - PSA pending    Iron  deficiency and B12 deficiency, last labs 1/25/23, have asked patient to get labs done can't give him any good information on labs done almost two years ago  Plan:    Follow-up 6 months         Diagnoses and all orders for this visit:  Other dietary vitamin B12 deficiency anemia  -     CBC and Auto Differential; Future  -     Comprehensive Metabolic Panel; Future  -     Ferritin; Future  -     Iron and TIBC; Future  -     Vitamin B12; Future  -     Clinic Appointment Request Follow up; Future  -     CBC and Auto Differential; Future  -     Comprehensive Metabolic Panel; Future  -     Ferritin; Future  -     Iron and TIBC; Future  -     Vitamin B12; Future  History of prostate cancer  -     Prostate Specific Antigen; Future  -     Testosterone; Future  -     Testosterone; Future  -     Prostate Specific Antigen; Future           Shae Araujo PA-C

## 2024-10-08 ENCOUNTER — LAB (OUTPATIENT)
Dept: LAB | Facility: CLINIC | Age: 64
End: 2024-10-08
Payer: COMMERCIAL

## 2024-10-08 DIAGNOSIS — Z85.46 HISTORY OF PROSTATE CANCER: ICD-10-CM

## 2024-10-08 DIAGNOSIS — D51.3 OTHER DIETARY VITAMIN B12 DEFICIENCY ANEMIA: ICD-10-CM

## 2024-10-08 LAB
ALBUMIN SERPL BCP-MCNC: 4.1 G/DL (ref 3.4–5)
ALP SERPL-CCNC: 69 U/L (ref 33–136)
ALT SERPL W P-5'-P-CCNC: 19 U/L (ref 10–52)
ANION GAP SERPL CALC-SCNC: 11 MMOL/L (ref 10–20)
AST SERPL W P-5'-P-CCNC: 27 U/L (ref 9–39)
BASOPHILS # BLD AUTO: 0.01 X10*3/UL (ref 0–0.1)
BASOPHILS NFR BLD AUTO: 0.2 %
BILIRUB SERPL-MCNC: 0.5 MG/DL (ref 0–1.2)
BUN SERPL-MCNC: 44 MG/DL (ref 6–23)
CALCIUM SERPL-MCNC: 9 MG/DL (ref 8.6–10.3)
CHLORIDE SERPL-SCNC: 106 MMOL/L (ref 98–107)
CO2 SERPL-SCNC: 27 MMOL/L (ref 21–32)
CREAT SERPL-MCNC: 1.89 MG/DL (ref 0.5–1.3)
EGFRCR SERPLBLD CKD-EPI 2021: 39 ML/MIN/1.73M*2
EOSINOPHIL # BLD AUTO: 0.3 X10*3/UL (ref 0–0.7)
EOSINOPHIL NFR BLD AUTO: 6.6 %
ERYTHROCYTE [DISTWIDTH] IN BLOOD BY AUTOMATED COUNT: 14.3 % (ref 11.5–14.5)
FERRITIN SERPL-MCNC: 146 NG/ML (ref 20–300)
GLUCOSE SERPL-MCNC: 94 MG/DL (ref 74–99)
HCT VFR BLD AUTO: 40.4 % (ref 41–52)
HGB BLD-MCNC: 13.1 G/DL (ref 13.5–17.5)
IMM GRANULOCYTES # BLD AUTO: 0.02 X10*3/UL (ref 0–0.7)
IMM GRANULOCYTES NFR BLD AUTO: 0.4 % (ref 0–0.9)
IRON SATN MFR SERPL: 39 % (ref 25–45)
IRON SERPL-MCNC: 111 UG/DL (ref 35–150)
LYMPHOCYTES # BLD AUTO: 1.33 X10*3/UL (ref 1.2–4.8)
LYMPHOCYTES NFR BLD AUTO: 29.1 %
MCH RBC QN AUTO: 31.3 PG (ref 26–34)
MCHC RBC AUTO-ENTMCNC: 32.4 G/DL (ref 32–36)
MCV RBC AUTO: 96 FL (ref 80–100)
MONOCYTES # BLD AUTO: 0.52 X10*3/UL (ref 0.1–1)
MONOCYTES NFR BLD AUTO: 11.4 %
NEUTROPHILS # BLD AUTO: 2.39 X10*3/UL (ref 1.2–7.7)
NEUTROPHILS NFR BLD AUTO: 52.3 %
NRBC BLD-RTO: 0 /100 WBCS (ref 0–0)
PLATELET # BLD AUTO: 209 X10*3/UL (ref 150–450)
POTASSIUM SERPL-SCNC: 4.7 MMOL/L (ref 3.5–5.3)
PROT SERPL-MCNC: 6.5 G/DL (ref 6.4–8.2)
PSA SERPL-MCNC: 0.13 NG/ML
RBC # BLD AUTO: 4.19 X10*6/UL (ref 4.5–5.9)
SODIUM SERPL-SCNC: 139 MMOL/L (ref 136–145)
TESTOST SERPL-MCNC: 564 NG/DL (ref 240–1000)
TIBC SERPL-MCNC: 288 UG/DL (ref 240–445)
UIBC SERPL-MCNC: 177 UG/DL (ref 110–370)
VIT B12 SERPL-MCNC: 932 PG/ML (ref 211–911)
WBC # BLD AUTO: 4.6 X10*3/UL (ref 4.4–11.3)

## 2024-10-08 PROCEDURE — 84153 ASSAY OF PSA TOTAL: CPT

## 2024-10-08 PROCEDURE — 80053 COMPREHEN METABOLIC PANEL: CPT

## 2024-10-08 PROCEDURE — 82607 VITAMIN B-12: CPT

## 2024-10-08 PROCEDURE — 36415 COLL VENOUS BLD VENIPUNCTURE: CPT

## 2024-10-08 PROCEDURE — 84403 ASSAY OF TOTAL TESTOSTERONE: CPT

## 2024-10-08 PROCEDURE — 83550 IRON BINDING TEST: CPT

## 2024-10-08 PROCEDURE — 82728 ASSAY OF FERRITIN: CPT

## 2024-10-08 PROCEDURE — 85025 COMPLETE CBC W/AUTO DIFF WBC: CPT

## 2024-10-11 ENCOUNTER — DOCUMENTATION (OUTPATIENT)
Dept: HEMATOLOGY/ONCOLOGY | Facility: CLINIC | Age: 64
End: 2024-10-11
Payer: COMMERCIAL

## 2024-10-11 NOTE — PROGRESS NOTES
Spoke with patient regarding acute change in kidney function.  Advised to call PCP for further evaluation and drink plenty of water until he can be seen  hSae Araujo PA-C

## 2024-10-16 ENCOUNTER — APPOINTMENT (OUTPATIENT)
Dept: PHARMACY | Facility: HOSPITAL | Age: 64
End: 2024-10-16
Payer: COMMERCIAL

## 2024-10-16 ENCOUNTER — TELEMEDICINE (OUTPATIENT)
Dept: CARDIOLOGY | Facility: CLINIC | Age: 64
End: 2024-10-16
Payer: COMMERCIAL

## 2024-10-16 DIAGNOSIS — I50.32 CHRONIC DIASTOLIC HEART FAILURE: Primary | ICD-10-CM

## 2024-10-16 ASSESSMENT — ENCOUNTER SYMPTOMS
CARDIOVASCULAR NEGATIVE: 1
NEUROLOGICAL NEGATIVE: 1
CONSTITUTIONAL NEGATIVE: 1
GASTROINTESTINAL NEGATIVE: 1
MUSCULOSKELETAL NEGATIVE: 1
RESPIRATORY NEGATIVE: 1

## 2024-10-16 NOTE — PROGRESS NOTES
Chief Complaint:   Follow up    History Of Present Illness:    .This is a 64 y/o male for a Cardiology follow up phone visit. Denies chest pain, sob, palpitations, or edema. He can't come in to the office as he is sole caregiver for his mother and sister. Labs per pcp.              Last Recorded Vitals:  There were no vitals taken for this visit.     Past Medical History:  Past Medical History:   Diagnosis Date    Anxiety     Atrial fibrillation (Multi)     Body mass index (BMI)40.0-44.9, adult 04/26/2021    BMI 40.0-44.9, adult    BPH (benign prostatic hyperplasia)     Depression     H/O knee surgery     High cholesterol     History of back surgery     HX: anticoagulation     Hypertension     Personal history of alcoholism (Multi)     Prostate cancer (Multi)     Stress         Past Surgical History:  Past Surgical History:   Procedure Laterality Date    BACK SURGERY      OTHER SURGICAL HISTORY  07/01/2013    Reported Hx Of Hip Replacement - Left Side    OTHER SURGICAL HISTORY  07/01/2013    Reported Hx Of Hip Replacement - Right Side       Social History:  Social History     Socioeconomic History    Marital status:    Occupational History    Occupation: retired   Tobacco Use    Smoking status: Never    Smokeless tobacco: Never   Substance and Sexual Activity    Alcohol use: Never       Family History:  Family History   Family history unknown: Yes         Allergies:  Patient has no known allergies.    Outpatient Medications:  Current Outpatient Medications   Medication Sig Dispense Refill    amiodarone (Pacerone) 200 mg tablet TAKE 1 TABLET DAILY 90 tablet 0    ezetimibe (Zetia) 10 mg tablet TAKE 1 TABLET BY MOUTH EVERY DAY 90 tablet 0    gabapentin (Neurontin) 400 mg capsule TAKE 1 CAPSULE (400 MG) BY MOUTH 3 TIMES A DAY. 90 capsule 0    apixaban (Eliquis) 5 mg tablet TAKE 1 TABLET BY MOUTH EVERY 12 HOURS 180 tablet 3    aspirin 81 mg chewable tablet Chew.      cholecalciferol (Vitamin D-3) 125 MCG (5000  UT) capsule Take 1 capsule (125 mcg) by mouth once daily. 90 capsule 0    cloNIDine (Catapres) 0.1 mg tablet TAKE 1 TABLET (0.1 MG) BY MOUTH 3 TIMES A DAY. 270 tablet 0    DULoxetine (Cymbalta) 60 mg DR capsule Take 1 capsule (60 mg) by mouth 2 times a day. 180 capsule 0    escitalopram (Lexapro) 10 mg tablet 1/2 TAB DAILY X 1 WEEK THEN 1 TAB DAILY X 1 WEEK THEN 1.5 TABS DAILY      ferrous sulfate 324 mg (65 mg elemental iron) EC tablet (delayed release) TAKE 1 TABLET (65 MG) BY MOUTH ONCE DAILY. 90 tablet 0    hydrOXYzine HCL (Atarax) 25 mg tablet 1-2 TABS DAILY AS NEEDED FOR ANXIETY      hydrOXYzine pamoate (VistariL) 25 mg capsule Take 1 capsule (25 mg) by mouth 3 times a day as needed for itching for up to 10 days. 30 capsule 0    losartan (Cozaar) 50 mg tablet TAKE 1 TABLET BY MOUTH EVERY DAY 90 tablet 0    metoprolol tartrate (Lopressor) 50 mg tablet TAKE 1 TABLET BY MOUTH TWICE A  tablet 0    mirtazapine (Remeron) 45 mg tablet Take 1 tablet (45 mg) by mouth once daily at bedtime. 90 tablet 0    naltrexone (Depade) 50 mg tablet Take 1 tablet (50 mg) by mouth once daily. 90 tablet 0    polyethylene glycol-electrolytes (Golytely) 420 gram solution Begin drinking first half of prep 6pm the night before procedure. Start second half 5 hours before procedure time. 4000 mL 0    rosuvastatin (Crestor) 40 mg tablet TAKE 1 TABLET BY MOUTH EVERY DAY 90 tablet 0    solifenacin (VESIcare) 10 mg tablet Take 1 tablet (10 mg) by mouth once daily.       No current facility-administered medications for this visit.        Physical Exam:  Physical Exam    ROS:  Review of Systems   Constitutional: Negative.   Cardiovascular: Negative.    Respiratory: Negative.     Skin: Negative.    Musculoskeletal: Negative.    Gastrointestinal: Negative.    Genitourinary: Negative.    Neurological: Negative.           Last Labs:  CBC -  Lab Results   Component Value Date    WBC 4.6 10/08/2024    HGB 13.1 (L) 10/08/2024    HCT 40.4 (L)  10/08/2024    MCV 96 10/08/2024     10/08/2024       CMP -  Lab Results   Component Value Date    CALCIUM 9.0 10/08/2024    PHOS 2.3 (L) 01/25/2023    PROT 6.5 10/08/2024    ALBUMIN 4.1 10/08/2024    AST 27 10/08/2024    ALT 19 10/08/2024    ALKPHOS 69 10/08/2024    BILITOT 0.5 10/08/2024       LIPID PANEL -   Lab Results   Component Value Date    CHOL 164 04/23/2024    TRIG 100 04/23/2024    HDL 74.4 04/23/2024    CHHDL 2.2 04/23/2024    LDLF 96 04/14/2023    VLDL 20 04/23/2024    NHDL 90 04/23/2024       RENAL FUNCTION PANEL -   Lab Results   Component Value Date    GLUCOSE 94 10/08/2024     10/08/2024    K 4.7 10/08/2024     10/08/2024    CO2 27 10/08/2024    ANIONGAP 11 10/08/2024    BUN 44 (H) 10/08/2024    CREATININE 1.89 (H) 10/08/2024    GFRMALE 41 (A) 09/26/2023    CALCIUM 9.0 10/08/2024    PHOS 2.3 (L) 01/25/2023    ALBUMIN 4.1 10/08/2024        Lab Results   Component Value Date    HGBA1C 5.4 09/28/2022         Assessment/Plan   Problem List Items Addressed This Visit    None    Impressions     1. JENNIFER hays Was admitted to [facility] August 2017 with decreased shortness of breath and possible CHF by chest x-ray. Pharmacological nuclear stress test was negative for ischemia. He was again admitted to the [facility] October 4, 2019 with new onset atrial fibrillation. He had an echocardiogram which on preliminary read showed preserved LV ejection fraction in the upper 50% range without significant valvular abnormality. Follow-up office visit October 23, 2019 showed sinus bradycardia. Echo done 04/2023 showed EF 60-65%.  1  2. Diastolic dysfunction. Last echocardiogram done in January 2019 showed an EF of 55-60%, bilateral atrial enlargement, mild TR.  3. Hyperlipidemia. Last fasting lipid panel showed cholesterol 121, HDL 41, LDL 68, triglycerides 55. Continue rosuvastatin 40 mg daily. Will add Zetia 10 mg daily.  4. Obesity. Successful weight loss with Contrave. Now will have  bariatric surgery.   5. Hypertension. Adequately controlled with current medications.  6. Obstructive sleep apnea. On CPAP.  7. Bilateral hip replacement history of back surgery  8. CAD. Had coronary artery calcium score of 370.1 when done 04/2022. Pharmacologic nuclear stress test was negative for ischemia 04/2023.  1  9. Hx of ETOH          Stephanie Sanchez, APRN-CNP

## 2024-10-18 ENCOUNTER — APPOINTMENT (OUTPATIENT)
Dept: PHARMACY | Facility: HOSPITAL | Age: 64
End: 2024-10-18
Payer: COMMERCIAL

## 2024-10-18 DIAGNOSIS — I48.0 PAROXYSMAL ATRIAL FIBRILLATION (MULTI): ICD-10-CM

## 2024-10-18 NOTE — Clinical Note
Niko Brown, Today's appointment was 1 month follow up regarding anticoagulation. Patient is doing well on Eliquis, no concerns regarding abnormal bruising or bleeding. No recent hospitalizations or falls to report. No pharmacotherapy changes at today's visit, will follow up in 6 months.

## 2024-10-18 NOTE — PROGRESS NOTES
I reviewed the progress note and agree with the resident's findings and plans as written. Case discussed with resident.    Shy Segura, PharmD  [phone]

## 2024-10-18 NOTE — ASSESSMENT & PLAN NOTE
Pt tolerates Eliquis with no s/s. Confirmed taking BID with no missed doses.   Will follow up on 04/18/25.  Continue Eliquis 5 mg BID.

## 2024-10-18 NOTE — PROGRESS NOTES
Pharmacist Clinic: Cardiology Management    Dilshad Freeman is a 64 y.o. male was referred to Clinical Pharmacy Team for anticoagulation management.     Referring Provider: Stephanie Sanchez APRN*    THIS IS A FOLLOW UP PATIENT APPOINTMENT. AT LAST VISIT ON 09/11/2024 WITH PHARMACIST (Shy Segura).    Appointment was completed by patient who was reached at [phone] .    REVIEW OF PAST APPNT (IF APPLICABLE):   Was a 3 month follow up regarding anticoag  Pt reported doing well with no s/s.    pap renewal was approved till 7/22/25.  DDI between amiodarone and escitalopram noted.     Allergies Reviewed? No    No Known Allergies    Past Medical History:   Diagnosis Date    Anxiety     Atrial fibrillation (Multi)     Body mass index (BMI)40.0-44.9, adult 04/26/2021    BMI 40.0-44.9, adult    BPH (benign prostatic hyperplasia)     Depression     H/O knee surgery     High cholesterol     History of back surgery     HX: anticoagulation     Hypertension     Personal history of alcoholism (Multi)     Prostate cancer (Multi)     Stress        Current Outpatient Medications on File Prior to Visit   Medication Sig Dispense Refill    amiodarone (Pacerone) 200 mg tablet TAKE 1 TABLET DAILY 90 tablet 0    apixaban (Eliquis) 5 mg tablet TAKE 1 TABLET BY MOUTH EVERY 12 HOURS 180 tablet 3    aspirin 81 mg chewable tablet Chew.      cloNIDine (Catapres) 0.1 mg tablet TAKE 1 TABLET (0.1 MG) BY MOUTH 3 TIMES A DAY. 270 tablet 0    escitalopram (Lexapro) 10 mg tablet Take 2 tablets (20 mg) by mouth once daily.      ezetimibe (Zetia) 10 mg tablet TAKE 1 TABLET BY MOUTH EVERY DAY 90 tablet 0    ferrous sulfate 324 mg (65 mg elemental iron) EC tablet (delayed release) TAKE 1 TABLET (65 MG) BY MOUTH ONCE DAILY. 90 tablet 0    gabapentin (Neurontin) 400 mg capsule TAKE 1 CAPSULE (400 MG) BY MOUTH 3 TIMES A DAY. 90 capsule 0    losartan (Cozaar) 50 mg tablet TAKE 1 TABLET BY MOUTH EVERY DAY 90 tablet 0    metoprolol tartrate (Lopressor)  50 mg tablet TAKE 1 TABLET BY MOUTH TWICE A  tablet 0    mirtazapine (Remeron) 45 mg tablet Take 1 tablet (45 mg) by mouth once daily at bedtime. 90 tablet 0    naltrexone (Depade) 50 mg tablet Take 1 tablet (50 mg) by mouth once daily. 90 tablet 0    rosuvastatin (Crestor) 40 mg tablet TAKE 1 TABLET BY MOUTH EVERY DAY 90 tablet 0    solifenacin (VESIcare) 10 mg tablet Take 1 tablet (10 mg) by mouth once daily.      [DISCONTINUED] cholecalciferol (Vitamin D-3) 125 MCG (5000 UT) capsule Take 1 capsule (125 mcg) by mouth once daily. 90 capsule 0    [DISCONTINUED] DULoxetine (Cymbalta) 60 mg DR capsule Take 1 capsule (60 mg) by mouth 2 times a day. 180 capsule 0    [DISCONTINUED] hydrOXYzine HCL (Atarax) 25 mg tablet 1-2 TABS DAILY AS NEEDED FOR ANXIETY      [DISCONTINUED] hydrOXYzine pamoate (VistariL) 25 mg capsule Take 1 capsule (25 mg) by mouth 3 times a day as needed for itching for up to 10 days. 30 capsule 0    [DISCONTINUED] polyethylene glycol-electrolytes (Golytely) 420 gram solution Begin drinking first half of prep 6pm the night before procedure. Start second half 5 hours before procedure time. 4000 mL 0     No current facility-administered medications on file prior to visit.         RELEVANT LAB RESULTS  Lab Results   Component Value Date    BILITOT 0.5 10/08/2024    CALCIUM 9.0 10/08/2024    CO2 27 10/08/2024     10/08/2024    CREATININE 1.89 (H) 10/08/2024    GLUCOSE 94 10/08/2024    ALKPHOS 69 10/08/2024    K 4.7 10/08/2024    PROT 6.5 10/08/2024     10/08/2024    AST 27 10/08/2024    ALT 19 10/08/2024    BUN 44 (H) 10/08/2024    ANIONGAP 11 10/08/2024    MG 1.83 11/02/2022    PHOS 2.3 (L) 01/25/2023     07/20/2020    ALBUMIN 4.1 10/08/2024    AMYLASE 46 02/01/2024    LIPASE 15 02/01/2024    GFRMALE 41 (A) 09/26/2023     Lab Results   Component Value Date    TRIG 100 04/23/2024    CHOL 164 04/23/2024    LDLCALC 70 04/23/2024    HDL 74.4 04/23/2024     No results found for:  "\"BMCBC\", \"CBCDIF\"     PHARMACEUTICAL ASSESSMENT    MEDICATION RECONCILIATION    Home Pharmacy Reviewed? No    Added:  - NA  Changed:  - NA  Removed:  - NA    Drug Interactions? Yes, describe: amiodarone and Escitalopram. Risk of increased Qtc interval. Previously addressed on 9/11/2024    Medication Documentation Review Audit       Reviewed by MOOKIE Powell (Nurse Practitioner) on 10/16/24 at 1457      Medication Order Taking? Sig Documenting Provider Last Dose Status   amiodarone (Pacerone) 200 mg tablet [id number] Yes TAKE 1 TABLET DAILY Yahaira Alcantar MD  Active   apixaban (Eliquis) 5 mg tablet [id number]  TAKE 1 TABLET BY MOUTH EVERY 12 HOURS MOOKIE Powell  Active   aspirin 81 mg chewable tablet [id number]  Chew. Historical Provider, MD  Active   cholecalciferol (Vitamin D-3) 125 MCG (5000 UT) capsule [id number]  Take 1 capsule (125 mcg) by mouth once daily. Yahaira Alcantar MD  Active   cloNIDine (Catapres) 0.1 mg tablet [id number]  TAKE 1 TABLET (0.1 MG) BY MOUTH 3 TIMES A DAY. Yahaira Alcantar MD  Active   DULoxetine (Cymbalta) 60 mg DR capsule [id number]  Take 1 capsule (60 mg) by mouth 2 times a day. Yahaira Alcantar MD  Active   escitalopram (Lexapro) 10 mg tablet [id number]  1/2 TAB DAILY X 1 WEEK THEN 1 TAB DAILY X 1 WEEK THEN 1.5 TABS DAILY Historical Provider, MD  Active   ezetimibe (Zetia) 10 mg tablet [id number] Yes TAKE 1 TABLET BY MOUTH EVERY DAY Yahaira Alcantar MD  Active   ferrous sulfate 324 mg (65 mg elemental iron) EC tablet (delayed release) [id number]  TAKE 1 TABLET (65 MG) BY MOUTH ONCE DAILY. Yahaira Alcantar MD  Active   gabapentin (Neurontin) 400 mg capsule [id number] Yes TAKE 1 CAPSULE (400 MG) BY MOUTH 3 TIMES A DAY. Yahaira Alcantar MD  Active   hydrOXYzine HCL (Atarax) 25 mg tablet [id number]  1-2 TABS DAILY AS NEEDED FOR ANXIETY Historical Provider, MD  Active   hydrOXYzine pamoate (VistariL) 25 mg capsule [id number]  Take 1 capsule (25 mg) by mouth 3 times a day as " needed for itching for up to 10 days. Yahaira Alcantar MD  Active   losartan (Cozaar) 50 mg tablet [id number]  TAKE 1 TABLET BY MOUTH EVERY DAY Yahaira Alcantar MD  Active   metoprolol tartrate (Lopressor) 50 mg tablet [id number]  TAKE 1 TABLET BY MOUTH TWICE A DAY Yahaira Alcantar MD  Active   mirtazapine (Remeron) 45 mg tablet [id number]  Take 1 tablet (45 mg) by mouth once daily at bedtime. Yahaira Alcantar MD   04/10/24 2359   naltrexone (Depade) 50 mg tablet [id number]  Take 1 tablet (50 mg) by mouth once daily. Yahaira Alcantar MD  Active   polyethylene glycol-electrolytes (Golytely) 420 gram solution [id number]  Begin drinking first half of prep 6pm the night before procedure. Start second half 5 hours before procedure time. Fide Shields MD  Active   rosuvastatin (Crestor) 40 mg tablet [id number]  TAKE 1 TABLET BY MOUTH EVERY DAY Yahaira Alcantar MD  Active   solifenacin (VESIcare) 10 mg tablet [id number]  Take 1 tablet (10 mg) by mouth once daily. Historical Provider, MD  Active                    DISEASE MANAGEMENT ASSESSMENT:     ANTICOAGULATION ASSESSMENT    The 10-year ASCVD risk score (Kia HESTER, et al., 2019) is: 22.6%    Values used to calculate the score:      Age: 64 years      Sex: Male      Is Non- : Yes      Diabetic: Yes      Tobacco smoker: No      Systolic Blood Pressure: 120 mmHg      Is BP treated: Yes      HDL Cholesterol: 74.4 mg/dL      Total Cholesterol: 164 mg/dL    DIAGNOSIS: prevention of nonvalvular atrial fibrilliation stroke and systemic embolism  - Patient is projected to be on anticoagulation unknown duration  - [id number] Score: [4] (only included if diagnosis is atrial fibrillation)   Age: [<65 (0)] [65-74 (+1)] [> 75 (+2)]: 0  Sex: [Male/Female (+1)]: 0  CHF history: [No/Yes(+1)]: 1  Hypertension history: [No/Yes(+1)]: 1  Stroke/TIA/thromboembolism history: [No/Yes(+2)]: 0  Vascular disease history (prior MI, peripheral artery disease, aortic plaque):  [No/Yes(+1)]: 1  Diabetes history: [No/Yes(+1)]: 1 (pre-diabetes)    CURRENT PHARMACOTHERAPY:    Elliquis 5 mg BID  65 yo  116 kg  Scr - 1.89 mg/dL    RELEVANT PAST MEDICAL HISTORY:   HTN, CAD, Obesity,     Affordability/Accessibility:  PAP  Adherence/Organization: takes BID, takes everyday   Adverse Reactions: none  Recent Hospitalizations: none  Recent Falls/Trauma: no  Changes in Tobacco or Alcohol Intake:   Tobacco: no  Alcohol: no    EDUCATION/COUNSELING:   - Counseled patient on MOA, expectations, duration of therapy, contraindications, administration, and monitoring parameters  - Counseled patient of side effects that are indicative of bleeding such as dark tarry stool, unexplainable bruising, or vomiting up a coffee ground like substance  -        DISCUSSION/NOTES:   Pt tolerates Eliquis with no s/s. Reports taking BID and not missing any doses.    PAP approved till 7/22/25.   Advised pt to schedule follow up with Cardio for future  PAP requirement.   Will follow up in 6 months.     ASSESSMENT AND PLAN:    Assessment/Plan   Problem List Items Addressed This Visit       Paroxysmal atrial fibrillation (Multi)     Pt tolerates Eliquis with no s/s. Confirmed taking BID with no missed doses.   Will follow up on 04/18/25.  Continue Eliquis 5 mg BID.               RECOMMENDATIONS/PLAN    Continue Eliquis 5 mg BID    Last Appnt with Referring Provider: 10/16/2024  Next Appnt with Referring Provider: to be scheduled.   Clinical Pharmacist follow up: 04/18/2025 11:20 am  VAF/Application Expiration: Yes    Date: 07/22/25  Type of Encounter: Winsome Douglas RPh    Verbal consent to manage patient's drug therapy was obtained from the patient . They were informed they may decline to participate or withdraw from participation in pharmacy services at any time.    Continue all meds under the continuation of care with the referring provider and clinical pharmacy team.

## 2024-11-04 PROCEDURE — RXMED WILLOW AMBULATORY MEDICATION CHARGE

## 2024-11-06 ENCOUNTER — PHARMACY VISIT (OUTPATIENT)
Dept: PHARMACY | Facility: CLINIC | Age: 64
End: 2024-11-06
Payer: MEDICARE

## 2024-11-19 DIAGNOSIS — I10 PRIMARY HYPERTENSION: ICD-10-CM

## 2024-11-19 DIAGNOSIS — D63.8 ANEMIA IN OTHER CHRONIC DISEASES CLASSIFIED ELSEWHERE: ICD-10-CM

## 2024-11-19 DIAGNOSIS — F32.A DEPRESSION, UNSPECIFIED DEPRESSION TYPE: ICD-10-CM

## 2024-11-19 DIAGNOSIS — F41.9 ANXIETY: ICD-10-CM

## 2024-11-19 DIAGNOSIS — R10.84 GENERALIZED ABDOMINAL PAIN: ICD-10-CM

## 2024-11-19 DIAGNOSIS — E78.00 HYPERCHOLESTEROLEMIA: ICD-10-CM

## 2024-11-19 RX ORDER — GABAPENTIN 400 MG/1
400 CAPSULE ORAL 3 TIMES DAILY
Qty: 90 CAPSULE | Refills: 0 | Status: SHIPPED | OUTPATIENT
Start: 2024-11-19 | End: 2025-11-19

## 2024-11-19 RX ORDER — METOPROLOL TARTRATE 50 MG/1
50 TABLET ORAL 2 TIMES DAILY
Qty: 180 TABLET | Refills: 0 | Status: SHIPPED | OUTPATIENT
Start: 2024-11-19

## 2024-11-19 RX ORDER — CLONIDINE HYDROCHLORIDE 0.1 MG/1
0.1 TABLET ORAL 3 TIMES DAILY
Qty: 270 TABLET | Refills: 0 | Status: SHIPPED | OUTPATIENT
Start: 2024-11-19

## 2024-11-19 RX ORDER — FERROUS SULFATE 324(65)MG
1 TABLET, DELAYED RELEASE (ENTERIC COATED) ORAL DAILY
Qty: 90 TABLET | Refills: 0 | Status: SHIPPED | OUTPATIENT
Start: 2024-11-19

## 2024-11-19 RX ORDER — EZETIMIBE 10 MG/1
10 TABLET ORAL DAILY
Qty: 90 TABLET | Refills: 0 | Status: SHIPPED | OUTPATIENT
Start: 2024-11-19

## 2024-11-19 RX ORDER — ROSUVASTATIN CALCIUM 40 MG/1
40 TABLET, COATED ORAL DAILY
Qty: 90 TABLET | Refills: 0 | Status: SHIPPED | OUTPATIENT
Start: 2024-11-19

## 2024-11-19 RX ORDER — LOSARTAN POTASSIUM 50 MG/1
50 TABLET ORAL DAILY
Qty: 90 TABLET | Refills: 0 | Status: SHIPPED | OUTPATIENT
Start: 2024-11-19

## 2024-11-27 DIAGNOSIS — I48.91 ATRIAL FIBRILLATION WITH CONTROLLED VENTRICULAR RATE (MULTI): ICD-10-CM

## 2024-11-29 RX ORDER — AMIODARONE HYDROCHLORIDE 200 MG/1
200 TABLET ORAL DAILY
Qty: 90 TABLET | Refills: 0 | OUTPATIENT
Start: 2024-11-29

## 2024-12-06 ENCOUNTER — APPOINTMENT (OUTPATIENT)
Dept: CARDIOLOGY | Facility: HOSPITAL | Age: 64
End: 2024-12-06
Payer: COMMERCIAL

## 2024-12-06 ENCOUNTER — HOSPITAL ENCOUNTER (EMERGENCY)
Facility: HOSPITAL | Age: 64
Discharge: OTHER INSTITUTION | End: 2024-12-07
Attending: EMERGENCY MEDICINE
Payer: COMMERCIAL

## 2024-12-06 DIAGNOSIS — I10 HYPERTENSION, UNSPECIFIED TYPE: ICD-10-CM

## 2024-12-06 DIAGNOSIS — F10.10 ALCOHOL ABUSE: ICD-10-CM

## 2024-12-06 DIAGNOSIS — F10.920 ALCOHOLIC INTOXICATION WITHOUT COMPLICATION (CMS-HCC): Primary | ICD-10-CM

## 2024-12-06 LAB
ALBUMIN SERPL BCP-MCNC: 4.2 G/DL (ref 3.4–5)
ALP SERPL-CCNC: 84 U/L (ref 33–136)
ALT SERPL W P-5'-P-CCNC: 67 U/L (ref 10–52)
AMPHETAMINES UR QL SCN: NORMAL
ANION GAP SERPL CALCULATED.3IONS-SCNC: 17 MMOL/L (ref 10–20)
APAP SERPL-MCNC: <10 UG/ML
APPEARANCE UR: CLEAR
AST SERPL W P-5'-P-CCNC: 72 U/L (ref 9–39)
BACTERIA #/AREA URNS AUTO: ABNORMAL /HPF
BARBITURATES UR QL SCN: NORMAL
BASOPHILS # BLD AUTO: 0.06 X10*3/UL (ref 0–0.1)
BASOPHILS NFR BLD AUTO: 1 %
BENZODIAZ UR QL SCN: NORMAL
BILIRUB SERPL-MCNC: 0.5 MG/DL (ref 0–1.2)
BILIRUB UR STRIP.AUTO-MCNC: NEGATIVE MG/DL
BUN SERPL-MCNC: 11 MG/DL (ref 6–23)
BZE UR QL SCN: NORMAL
CALCIUM SERPL-MCNC: 8.6 MG/DL (ref 8.6–10.3)
CANNABINOIDS UR QL SCN: NORMAL
CHLORIDE SERPL-SCNC: 105 MMOL/L (ref 98–107)
CO2 SERPL-SCNC: 24 MMOL/L (ref 21–32)
COLOR UR: COLORLESS
CREAT SERPL-MCNC: 1.06 MG/DL (ref 0.5–1.3)
EGFRCR SERPLBLD CKD-EPI 2021: 77 ML/MIN/1.73M*2
EOSINOPHIL # BLD AUTO: 0.04 X10*3/UL (ref 0–0.7)
EOSINOPHIL NFR BLD AUTO: 0.6 %
ERYTHROCYTE [DISTWIDTH] IN BLOOD BY AUTOMATED COUNT: 15.6 % (ref 11.5–14.5)
ETHANOL SERPL-MCNC: 395 MG/DL
FENTANYL+NORFENTANYL UR QL SCN: NORMAL
GLUCOSE SERPL-MCNC: 78 MG/DL (ref 74–99)
GLUCOSE UR STRIP.AUTO-MCNC: NORMAL MG/DL
HCT VFR BLD AUTO: 38.9 % (ref 41–52)
HGB BLD-MCNC: 12.9 G/DL (ref 13.5–17.5)
IMM GRANULOCYTES # BLD AUTO: 0.03 X10*3/UL (ref 0–0.7)
IMM GRANULOCYTES NFR BLD AUTO: 0.5 % (ref 0–0.9)
KETONES UR STRIP.AUTO-MCNC: NEGATIVE MG/DL
LEUKOCYTE ESTERASE UR QL STRIP.AUTO: NEGATIVE
LIPASE SERPL-CCNC: 27 U/L (ref 9–82)
LYMPHOCYTES # BLD AUTO: 1.89 X10*3/UL (ref 1.2–4.8)
LYMPHOCYTES NFR BLD AUTO: 30.2 %
MCH RBC QN AUTO: 30.9 PG (ref 26–34)
MCHC RBC AUTO-ENTMCNC: 33.2 G/DL (ref 32–36)
MCV RBC AUTO: 93 FL (ref 80–100)
METHADONE UR QL SCN: NORMAL
MONOCYTES # BLD AUTO: 0.58 X10*3/UL (ref 0.1–1)
MONOCYTES NFR BLD AUTO: 9.3 %
NEUTROPHILS # BLD AUTO: 3.66 X10*3/UL (ref 1.2–7.7)
NEUTROPHILS NFR BLD AUTO: 58.4 %
NITRITE UR QL STRIP.AUTO: NEGATIVE
NRBC BLD-RTO: 0 /100 WBCS (ref 0–0)
OPIATES UR QL SCN: NORMAL
OXYCODONE+OXYMORPHONE UR QL SCN: NORMAL
PCP UR QL SCN: NORMAL
PH UR STRIP.AUTO: 6 [PH]
PLATELET # BLD AUTO: 192 X10*3/UL (ref 150–450)
POTASSIUM SERPL-SCNC: 3.7 MMOL/L (ref 3.5–5.3)
PROT SERPL-MCNC: 6.7 G/DL (ref 6.4–8.2)
PROT UR STRIP.AUTO-MCNC: NEGATIVE MG/DL
RBC # BLD AUTO: 4.18 X10*6/UL (ref 4.5–5.9)
RBC # UR STRIP.AUTO: ABNORMAL /UL
RBC #/AREA URNS AUTO: ABNORMAL /HPF
SALICYLATES SERPL-MCNC: <3 MG/DL
SODIUM SERPL-SCNC: 142 MMOL/L (ref 136–145)
SP GR UR STRIP.AUTO: 1
UROBILINOGEN UR STRIP.AUTO-MCNC: NORMAL MG/DL
WBC # BLD AUTO: 6.3 X10*3/UL (ref 4.4–11.3)
WBC #/AREA URNS AUTO: ABNORMAL /HPF

## 2024-12-06 PROCEDURE — 81001 URINALYSIS AUTO W/SCOPE: CPT | Mod: 59 | Performed by: EMERGENCY MEDICINE

## 2024-12-06 PROCEDURE — 36415 COLL VENOUS BLD VENIPUNCTURE: CPT | Performed by: EMERGENCY MEDICINE

## 2024-12-06 PROCEDURE — 80307 DRUG TEST PRSMV CHEM ANLYZR: CPT | Performed by: EMERGENCY MEDICINE

## 2024-12-06 PROCEDURE — 93005 ELECTROCARDIOGRAM TRACING: CPT

## 2024-12-06 PROCEDURE — 83690 ASSAY OF LIPASE: CPT | Performed by: EMERGENCY MEDICINE

## 2024-12-06 PROCEDURE — 84075 ASSAY ALKALINE PHOSPHATASE: CPT | Performed by: EMERGENCY MEDICINE

## 2024-12-06 PROCEDURE — 81003 URINALYSIS AUTO W/O SCOPE: CPT | Performed by: EMERGENCY MEDICINE

## 2024-12-06 PROCEDURE — 99285 EMERGENCY DEPT VISIT HI MDM: CPT | Performed by: EMERGENCY MEDICINE

## 2024-12-06 PROCEDURE — 2500000001 HC RX 250 WO HCPCS SELF ADMINISTERED DRUGS (ALT 637 FOR MEDICARE OP): Performed by: EMERGENCY MEDICINE

## 2024-12-06 PROCEDURE — 80179 DRUG ASSAY SALICYLATE: CPT | Performed by: EMERGENCY MEDICINE

## 2024-12-06 PROCEDURE — 80320 DRUG SCREEN QUANTALCOHOLS: CPT | Performed by: EMERGENCY MEDICINE

## 2024-12-06 PROCEDURE — 2500000004 HC RX 250 GENERAL PHARMACY W/ HCPCS (ALT 636 FOR OP/ED): Performed by: EMERGENCY MEDICINE

## 2024-12-06 PROCEDURE — 96374 THER/PROPH/DIAG INJ IV PUSH: CPT

## 2024-12-06 PROCEDURE — 85025 COMPLETE CBC W/AUTO DIFF WBC: CPT | Performed by: EMERGENCY MEDICINE

## 2024-12-06 RX ORDER — FOLIC ACID 1 MG/1
1 TABLET ORAL DAILY
Status: DISCONTINUED | OUTPATIENT
Start: 2024-12-06 | End: 2024-12-07 | Stop reason: HOSPADM

## 2024-12-06 RX ORDER — LANOLIN ALCOHOL/MO/W.PET/CERES
100 CREAM (GRAM) TOPICAL DAILY
Status: DISCONTINUED | OUTPATIENT
Start: 2024-12-09 | End: 2024-12-07 | Stop reason: HOSPADM

## 2024-12-06 RX ORDER — MULTIVIT-MIN/IRON FUM/FOLIC AC 7.5 MG-4
1 TABLET ORAL DAILY
Status: DISCONTINUED | OUTPATIENT
Start: 2024-12-06 | End: 2024-12-07 | Stop reason: HOSPADM

## 2024-12-06 RX ORDER — DIAZEPAM 5 MG/1
10 TABLET ORAL EVERY 2 HOUR PRN
Status: DISCONTINUED | OUTPATIENT
Start: 2024-12-06 | End: 2024-12-07 | Stop reason: HOSPADM

## 2024-12-06 RX ORDER — THIAMINE HYDROCHLORIDE 100 MG/ML
100 INJECTION, SOLUTION INTRAMUSCULAR; INTRAVENOUS DAILY
Status: DISCONTINUED | OUTPATIENT
Start: 2024-12-06 | End: 2024-12-07 | Stop reason: HOSPADM

## 2024-12-06 RX ADMIN — FOLIC ACID 1 MG: 1 TABLET ORAL at 18:01

## 2024-12-06 RX ADMIN — Medication 1 TABLET: at 18:01

## 2024-12-06 RX ADMIN — THIAMINE HYDROCHLORIDE 100 MG: 100 INJECTION, SOLUTION INTRAMUSCULAR; INTRAVENOUS at 18:01

## 2024-12-06 SDOH — HEALTH STABILITY: MENTAL HEALTH: BEHAVIORAL HEALTH(WDL): WITHIN DEFINED LIMITS

## 2024-12-06 ASSESSMENT — LIFESTYLE VARIABLES
TREMOR: NO TREMOR
TREMOR: 2
PAROXYSMAL SWEATS: NO SWEAT VISIBLE
TOTAL SCORE: 1
ANXIETY: 2
TOTAL SCORE: 2
TOTAL SCORE: 4
NAUSEA AND VOMITING: NO NAUSEA AND NO VOMITING
VISUAL DISTURBANCES: NOT PRESENT
AGITATION: NORMAL ACTIVITY
HAVE YOU EVER FELT YOU SHOULD CUT DOWN ON YOUR DRINKING: YES
ORIENTATION AND CLOUDING OF SENSORIUM: ORIENTED AND CAN DO SERIAL ADDITIONS
PAROXYSMAL SWEATS: NO SWEAT VISIBLE
NAUSEA AND VOMITING: NO NAUSEA AND NO VOMITING
AGITATION: NORMAL ACTIVITY
AGITATION: NORMAL ACTIVITY
AUDITORY DISTURBANCES: NOT PRESENT
ORIENTATION AND CLOUDING OF SENSORIUM: ORIENTED AND CAN DO SERIAL ADDITIONS
NAUSEA AND VOMITING: NO NAUSEA AND NO VOMITING
AUDITORY DISTURBANCES: NOT PRESENT
EVER HAD A DRINK FIRST THING IN THE MORNING TO STEADY YOUR NERVES TO GET RID OF A HANGOVER: NO
TREMOR: NO TREMOR
NAUSEA AND VOMITING: NO NAUSEA AND NO VOMITING
PAROXYSMAL SWEATS: NO SWEAT VISIBLE
HEADACHE, FULLNESS IN HEAD: NOT PRESENT
TOTAL SCORE: 0
ANXIETY: NO ANXIETY, AT EASE
HEADACHE, FULLNESS IN HEAD: NOT PRESENT
HEADACHE, FULLNESS IN HEAD: NOT PRESENT
ORIENTATION AND CLOUDING OF SENSORIUM: ORIENTED AND CAN DO SERIAL ADDITIONS
VISUAL DISTURBANCES: NOT PRESENT
EVER FELT BAD OR GUILTY ABOUT YOUR DRINKING: NO
AUDITORY DISTURBANCES: NOT PRESENT
VISUAL DISTURBANCES: NOT PRESENT
AUDITORY DISTURBANCES: NOT PRESENT
PAROXYSMAL SWEATS: NO SWEAT VISIBLE
TREMOR: 2
ORIENTATION AND CLOUDING OF SENSORIUM: ORIENTED AND CAN DO SERIAL ADDITIONS
ANXIETY: 2
HEADACHE, FULLNESS IN HEAD: NOT PRESENT
HAVE PEOPLE ANNOYED YOU BY CRITICIZING YOUR DRINKING: NO
VISUAL DISTURBANCES: NOT PRESENT
ANXIETY: 2
AGITATION: NORMAL ACTIVITY
TOTAL SCORE: 4

## 2024-12-06 ASSESSMENT — ABNORMAL INVOLUNTARY MOVEMENT SCALE (AIMS)
JAW: NONE, NORMAL
AIMS_PATIENT_AWARENESS: NO AWARENESS
LOWER_BODY_EXTREMITIES: NONE, NORMAL
TONGUE: NONE, NORMAL
FACIAL_EXPRESSION_MUSCLES: NONE, NORMAL
UPPER_BODY_EXTREMITIES: NONE, NORMAL
CURRENT_PROBLEMS_TEETH_DENTURES: NO
PATIENT_WEARS_DENTURES: NO
LIPS_PARIETAL: NONE, NORMAL
AIMS_PATIENT_INCAPACITATION: NONE, NORMAL
NECK_SHOULDER_HIPS: NONE, NORMAL

## 2024-12-06 ASSESSMENT — COLUMBIA-SUICIDE SEVERITY RATING SCALE - C-SSRS
1. IN THE PAST MONTH, HAVE YOU WISHED YOU WERE DEAD OR WISHED YOU COULD GO TO SLEEP AND NOT WAKE UP?: NO
2. HAVE YOU ACTUALLY HAD ANY THOUGHTS OF KILLING YOURSELF?: NO
6. HAVE YOU EVER DONE ANYTHING, STARTED TO DO ANYTHING, OR PREPARED TO DO ANYTHING TO END YOUR LIFE?: NO

## 2024-12-06 ASSESSMENT — PAIN - FUNCTIONAL ASSESSMENT: PAIN_FUNCTIONAL_ASSESSMENT: 0-10

## 2024-12-06 ASSESSMENT — PAIN SCALES - GENERAL: PAINLEVEL_OUTOF10: 0 - NO PAIN

## 2024-12-06 NOTE — PROGRESS NOTES
"Social Work Note  SS consult placed for 65y/o male who presented to [facility] ED by ambulance due to alcohol withdrawal. Patient had reportedly signed up for a detox program that he was supposed to attend, but this facility contacted him today to inform him that his health insurance was out of network. Patient is seeking alternate treatment. Patient reports drinking ~1/2 gallon of Eboni daily, with last drink sometime this morning. Denies current withdrawal symptoms, though appears to be intoxicated, evidenced by drowsiness and slow, somewhat slurred speech. Patient reports having issues with alcohol \"probably since I was 8\". States he has been in various inpatient and outpatient treatment programs in the past, between [place] and [place]. Has tried AA before, but didn't feel it was helpful. Last formal treatment was several years ago. Patient does not recall where. Patient states his longest period of sobriety was around 2 years, ending around 2 years ago. When asked if patient felt there were any triggers for his relapse, he states \"boredom\". Patient reports typically having withdrawal symptoms of tremors, n/v/d, feeling hot/sweating, anxiety. Reports one previous withdrawal seizure around 1 year ago, however when asked about it, patient stated it occurred several months after his last drink of alcohol. Patient confirms interest in detox. BAL on arrival 0.395. Will refer to [facility] once medically cleared.  "

## 2024-12-06 NOTE — ED PROVIDER NOTES
HPI   No chief complaint on file.      Patient is a 64-year-old male presents emergency department for evaluation of alcohol intoxication and alcohol abuse requesting placement for alcohol detox.  Patient states that he drinks every day and typically drinks about a half a gallon of Eboni every day.  He states that he wants to get clean and wants to stop drinking.  He states that his last use was earlier this morning, but does not recall exactly when.  He denies any other illicit drug use and denies any smoking history.  He has no medical complaints at today's visit.  He states that he did have detox lined up outpatient, but states that they did not take his insurance and therefore it fell through and he was unable to go home.  He denies any recent illnesses, fevers, chills, nausea, vomiting abdominal pain, or other symptoms at this time.      History provided by:  Patient   used: No            Patient History   Past Medical History:   Diagnosis Date    Anxiety     Atrial fibrillation (Multi)     Body mass index (BMI)40.0-44.9, adult 04/26/2021    BMI 40.0-44.9, adult    BPH (benign prostatic hyperplasia)     Depression     H/O knee surgery     High cholesterol     History of back surgery     HX: anticoagulation     Hypertension     Personal history of alcoholism (Multi)     Prostate cancer (Multi)     Stress      Past Surgical History:   Procedure Laterality Date    BACK SURGERY      OTHER SURGICAL HISTORY  07/01/2013    Reported Hx Of Hip Replacement - Left Side    OTHER SURGICAL HISTORY  07/01/2013    Reported Hx Of Hip Replacement - Right Side     Family History   Family history unknown: Yes     Social History     Tobacco Use    Smoking status: Never    Smokeless tobacco: Never   Substance Use Topics    Alcohol use: Never    Drug use: Not on file       Physical Exam   ED Triage Vitals   Temp Pulse Resp BP   -- -- -- --      SpO2 Temp src Heart Rate Source Patient Position   -- -- -- --       BP Location FiO2 (%)     -- --       Physical Exam  Constitutional:       Comments: Appears acutely intoxicated.   Cardiovascular:      Rate and Rhythm: Normal rate and regular rhythm.   Pulmonary:      Effort: Pulmonary effort is normal.      Breath sounds: Normal breath sounds.   Abdominal:      General: Abdomen is flat.      Palpations: Abdomen is soft.      Tenderness: There is no abdominal tenderness.   Musculoskeletal:         General: Normal range of motion.   Skin:     General: Skin is warm and dry.   Neurological:      General: No focal deficit present.      Mental Status: He is alert and oriented to person, place, and time.           ED Course & MDM   Diagnoses as of 12/07/24 0101   Alcoholic intoxication without complication (CMS-[facility])   Alcohol abuse   Hypertension, unspecified type                 No data recorded                                 Medical Decision Making  Patient is a 64-year-old male presents emergency department for evaluation of alcohol intoxication requesting alcohol detox.    EKG was interpreted by attending physician and reviewed by me.    Lab work done today included CMP, CBC, alcohol level, salicylate Tylenol level, urinalysis, urine drug screen.  Lab work with acute alcohol intoxication, transaminitis, and anemia.    Scans not warranted at today's visit.    Medications given at today's visit include p.o. thiamine, p.o. folic acid, p.o. multivitamin, p.o. Valium    I saw this patient in conjunction with Dr. Lyle.  Patient given oral vitamins, folic acid, and thiamine given history of alcohol abuse and started on CIWA protocol with PO valium.  Patient remained stable in the emergency department.  Lab work shows acute alcohol desiccation and anemia, but otherwise unremarkable.  Patient ultimately medically.  This time and pending evaluation by crisis counselor for placement for inpatient detox.  Patient ultimately pending acceptance for inpatient detox moving forward.  Patient  care was ongoing at time of my departure. Continuation of care and final disposition will by managed by attending physician. We discussed the pertinent history, physical exam, completed/pending test results (if applicable) and current treatment plan. Please refer to his/her chart for the patients remaining Emergency Department course and final disposition.    ** Disclaimer:  Parts of this document were written utilizing a voice to text dictation software.  Note may contain minor transcription or typographical errors that were inadvertently transcribed by the computer software.        Procedure  Procedures     Bushra Blair PA-C  12/07/24 0101

## 2024-12-07 VITALS
SYSTOLIC BLOOD PRESSURE: 161 MMHG | TEMPERATURE: 98.4 F | WEIGHT: 250 LBS | OXYGEN SATURATION: 94 % | BODY MASS INDEX: 37.89 KG/M2 | DIASTOLIC BLOOD PRESSURE: 91 MMHG | HEART RATE: 86 BPM | HEIGHT: 68 IN | RESPIRATION RATE: 16 BRPM

## 2024-12-07 PROCEDURE — 2500000004 HC RX 250 GENERAL PHARMACY W/ HCPCS (ALT 636 FOR OP/ED): Performed by: STUDENT IN AN ORGANIZED HEALTH CARE EDUCATION/TRAINING PROGRAM

## 2024-12-07 PROCEDURE — 96376 TX/PRO/DX INJ SAME DRUG ADON: CPT

## 2024-12-07 PROCEDURE — 96375 TX/PRO/DX INJ NEW DRUG ADDON: CPT

## 2024-12-07 PROCEDURE — 2500000001 HC RX 250 WO HCPCS SELF ADMINISTERED DRUGS (ALT 637 FOR MEDICARE OP): Performed by: EMERGENCY MEDICINE

## 2024-12-07 PROCEDURE — 2500000002 HC RX 250 W HCPCS SELF ADMINISTERED DRUGS (ALT 637 FOR MEDICARE OP, ALT 636 FOR OP/ED): Performed by: EMERGENCY MEDICINE

## 2024-12-07 PROCEDURE — 2500000004 HC RX 250 GENERAL PHARMACY W/ HCPCS (ALT 636 FOR OP/ED): Performed by: EMERGENCY MEDICINE

## 2024-12-07 RX ORDER — ONDANSETRON HYDROCHLORIDE 2 MG/ML
4 INJECTION, SOLUTION INTRAVENOUS ONCE
Status: COMPLETED | OUTPATIENT
Start: 2024-12-07 | End: 2024-12-07

## 2024-12-07 RX ORDER — DIAZEPAM 5 MG/ML
2 INJECTION, SOLUTION INTRAMUSCULAR; INTRAVENOUS ONCE
Status: COMPLETED | OUTPATIENT
Start: 2024-12-07 | End: 2024-12-07

## 2024-12-07 RX ADMIN — THIAMINE HYDROCHLORIDE 100 MG: 100 INJECTION, SOLUTION INTRAMUSCULAR; INTRAVENOUS at 08:47

## 2024-12-07 RX ADMIN — Medication 1 TABLET: at 08:48

## 2024-12-07 RX ADMIN — DIAZEPAM 2 MG: 5 INJECTION, SOLUTION INTRAMUSCULAR; INTRAVENOUS at 12:21

## 2024-12-07 RX ADMIN — ONDANSETRON 4 MG: 2 INJECTION INTRAMUSCULAR; INTRAVENOUS at 12:20

## 2024-12-07 RX ADMIN — FOLIC ACID 1 MG: 1 TABLET ORAL at 08:48

## 2024-12-07 RX ADMIN — DIAZEPAM 10 MG: 5 TABLET ORAL at 08:48

## 2024-12-07 RX ADMIN — DIAZEPAM 10 MG: 5 TABLET ORAL at 12:20

## 2024-12-07 ASSESSMENT — LIFESTYLE VARIABLES
ORIENTATION AND CLOUDING OF SENSORIUM: ORIENTED AND CAN DO SERIAL ADDITIONS
TOTAL SCORE: 8
HEADACHE, FULLNESS IN HEAD: NOT PRESENT
NAUSEA AND VOMITING: NO NAUSEA AND NO VOMITING
TOTAL SCORE: 11
TOTAL SCORE: 4
AUDITORY DISTURBANCES: NOT PRESENT
VISUAL DISTURBANCES: NOT PRESENT
ORIENTATION AND CLOUDING OF SENSORIUM: ORIENTED AND CAN DO SERIAL ADDITIONS
TREMOR: 2
VISUAL DISTURBANCES: NOT PRESENT
AUDITORY DISTURBANCES: NOT PRESENT
VISUAL DISTURBANCES: NOT PRESENT
PAROXYSMAL SWEATS: NO SWEAT VISIBLE
AUDITORY DISTURBANCES: NOT PRESENT
AGITATION: NORMAL ACTIVITY
TREMOR: 2
HEADACHE, FULLNESS IN HEAD: NOT PRESENT
AGITATION: SOMEWHAT MORE THAN NORMAL ACTIVITY
PAROXYSMAL SWEATS: NO SWEAT VISIBLE
NAUSEA AND VOMITING: 2
ANXIETY: 2
TOTAL SCORE: 5
TREMOR: 2
ORIENTATION AND CLOUDING OF SENSORIUM: ORIENTED AND CAN DO SERIAL ADDITIONS
AUDITORY DISTURBANCES: NOT PRESENT
PAROXYSMAL SWEATS: BARELY PERCEPTIBLE SWEATING, PALMS MOIST
HEADACHE, FULLNESS IN HEAD: NOT PRESENT
TOTAL SCORE: 4
AUDITORY DISTURBANCES: NOT PRESENT
ANXIETY: 2
TREMOR: MODERATE, WITH PATIENT'S ARMS EXTENDED
AGITATION: NORMAL ACTIVITY
ANXIETY: MILDLY ANXIOUS
AGITATION: NORMAL ACTIVITY
HEADACHE, FULLNESS IN HEAD: NOT PRESENT
HEADACHE, FULLNESS IN HEAD: NOT PRESENT
AGITATION: SOMEWHAT MORE THAN NORMAL ACTIVITY
VISUAL DISTURBANCES: NOT PRESENT
VISUAL DISTURBANCES: NOT PRESENT
PAROXYSMAL SWEATS: NO SWEAT VISIBLE
NAUSEA AND VOMITING: NO NAUSEA AND NO VOMITING
ANXIETY: 2
PAROXYSMAL SWEATS: BARELY PERCEPTIBLE SWEATING, PALMS MOIST
ORIENTATION AND CLOUDING OF SENSORIUM: ORIENTED AND CAN DO SERIAL ADDITIONS
NAUSEA AND VOMITING: NO NAUSEA AND NO VOMITING
TREMOR: 3
ORIENTATION AND CLOUDING OF SENSORIUM: ORIENTED AND CAN DO SERIAL ADDITIONS
NAUSEA AND VOMITING: 3
ANXIETY: 3

## 2024-12-07 NOTE — PROGRESS NOTES
Spoke with [facility] intake, their medical director Dr. Lazo would like pt to be observed for 24hrs before being re-referred. Pt has a h/o alcohol withdrawal seizures and he would like to be certain pt is medically clear before they can accept. Pt notified, SW offered to refer pt elsewhere but he prefers to wait for [facility] if possible. Peer Support met with pt at bedside to discuss alternative detox options. RN and ED provider aware.

## 2024-12-07 NOTE — ED PROVIDER NOTES
"Patient signed out to me by off going provider, Dr. Luna Menjivar, at 7:14 AM in stable condtion.    IN BRIEF:  64 y.o.male // pmhx etoh use/dependency // p/w requesting detox for alcohol, reported seizure  - significantly elevated etoh  - medically cleared at this time    BP (!) 161/91   Pulse 86   Temp 36.9 °C (98.4 °F)   Resp 16   Ht 1.727 m (5' 8\")   Wt 113 kg (250 lb)   SpO2 94%   BMI 38.01 kg/m²     Diagnoses as of 12/08/24 0714   Alcoholic intoxication without complication (CMS-HCC)   Alcohol abuse   Hypertension, unspecified type       Remaining work up:  Reassessment, Sober re-eval, and Recommendations [facility] () ED    Likely disposition detox/rehab placement with alternative discharge home.    Physical Exam  Vitals and nursing note reviewed.   Constitutional:       Appearance: Normal appearance. He is normal weight.   HENT:      Mouth/Throat:      Mouth: Mucous membranes are moist.   Eyes:      Pupils: Pupils are equal, round, and reactive to light.   Cardiovascular:      Rate and Rhythm: Normal rate and regular rhythm.      Pulses: Normal pulses.   Pulmonary:      Effort: Pulmonary effort is normal.      Breath sounds: Normal breath sounds.   Skin:     General: Skin is warm and dry.   Neurological:      General: No focal deficit present.      Mental Status: He is alert and oriented to person, place, and time.         TRANSITION of CARE INTERVAL:  Patient was under my direct clinical supervision and on reassessment patient did not endorse any new or significantly worsening symptoms and demonstrated no evidence of decompensation. I did not provide any critical/noncritical direct medical care or interventions and patient remained clinically stable while under my clinical supervision.  Cussed patient with [facility] () ED and patient will be accepted to [facility] at this time for further EtOH detox and withdrawal management.  Patient transferred to [facility]        FINAL IMPRESSION:  1. " Alcoholic intoxication without complication (CMS-HCC)        2. Alcohol abuse        3. Hypertension, unspecified type              FINAL DISPOSITION:  Jackson Oconnor MD  12/08/24 2190

## 2024-12-07 NOTE — PROGRESS NOTES
Patient was received in signout at start of shift.    IN BRIEF   64-year-old male presents for detox.    The time my shift, patient is pending acceptance to a detox facility.     ED COURSE   Patient has been accepted to [facility].  Patient has been seizure-free throughout my shift.  Patient is stable for transfer.    FINAL IMPRESSION      1. Alcoholic intoxication without complication (CMS-HCC)    2. Alcohol abuse    3. Hypertension, unspecified type          DISPOSITION    Transfer To Another Facility 12/06/2024 09:01:21 PM

## 2024-12-09 LAB
ATRIAL RATE: 70 BPM
P AXIS: 29 DEGREES
PR INTERVAL: 208 MS
Q ONSET: 205 MS
QRS COUNT: 12 BEATS
QRS DURATION: 108 MS
QT INTERVAL: 438 MS
QTC CALCULATION(BAZETT): 473 MS
QTC FREDERICIA: 461 MS
R AXIS: 7 DEGREES
T AXIS: 37 DEGREES
T OFFSET: 424 MS
VENTRICULAR RATE: 70 BPM

## 2025-01-15 ENCOUNTER — LAB (OUTPATIENT)
Dept: LAB | Facility: LAB | Age: 65
End: 2025-01-15
Payer: MEDICARE

## 2025-01-15 DIAGNOSIS — E78.00 HYPERCHOLESTEROLEMIA: ICD-10-CM

## 2025-01-15 DIAGNOSIS — I10 PRIMARY HYPERTENSION: ICD-10-CM

## 2025-01-15 LAB
ALBUMIN SERPL BCP-MCNC: 3.6 G/DL (ref 3.4–5)
ALP SERPL-CCNC: 71 U/L (ref 33–136)
ALT SERPL W P-5'-P-CCNC: 35 U/L (ref 10–52)
AMMONIA PLAS-SCNC: 22 UMOL/L (ref 16–53)
ANION GAP SERPL CALCULATED.3IONS-SCNC: 8 MMOL/L (ref 10–20)
AST SERPL W P-5'-P-CCNC: 34 U/L (ref 9–39)
BILIRUB SERPL-MCNC: 0.4 MG/DL (ref 0–1.2)
BUN SERPL-MCNC: 25 MG/DL (ref 6–23)
CALCIUM SERPL-MCNC: 8.7 MG/DL (ref 8.6–10.3)
CHLORIDE SERPL-SCNC: 107 MMOL/L (ref 98–107)
CO2 SERPL-SCNC: 29 MMOL/L (ref 21–32)
CREAT SERPL-MCNC: 1.44 MG/DL (ref 0.5–1.3)
EGFRCR SERPLBLD CKD-EPI 2021: 54 ML/MIN/1.73M*2
ERYTHROCYTE [DISTWIDTH] IN BLOOD BY AUTOMATED COUNT: 13.9 % (ref 11.5–14.5)
GLUCOSE SERPL-MCNC: 87 MG/DL (ref 74–99)
HCT VFR BLD AUTO: 37.4 % (ref 41–52)
HGB BLD-MCNC: 11.8 G/DL (ref 13.5–17.5)
MCH RBC QN AUTO: 30.9 PG (ref 26–34)
MCHC RBC AUTO-ENTMCNC: 31.6 G/DL (ref 32–36)
MCV RBC AUTO: 98 FL (ref 80–100)
NRBC BLD-RTO: 0 /100 WBCS (ref 0–0)
PLATELET # BLD AUTO: 316 X10*3/UL (ref 150–450)
POTASSIUM SERPL-SCNC: 4.9 MMOL/L (ref 3.5–5.3)
PROT SERPL-MCNC: 6.3 G/DL (ref 6.4–8.2)
RBC # BLD AUTO: 3.82 X10*6/UL (ref 4.5–5.9)
SODIUM SERPL-SCNC: 139 MMOL/L (ref 136–145)
WBC # BLD AUTO: 5.9 X10*3/UL (ref 4.4–11.3)

## 2025-01-18 ENCOUNTER — APPOINTMENT (OUTPATIENT)
Dept: PRIMARY CARE | Facility: CLINIC | Age: 65
End: 2025-01-18
Payer: COMMERCIAL

## 2025-01-18 VITALS
DIASTOLIC BLOOD PRESSURE: 80 MMHG | WEIGHT: 262 LBS | SYSTOLIC BLOOD PRESSURE: 132 MMHG | HEIGHT: 68 IN | BODY MASS INDEX: 39.71 KG/M2

## 2025-01-18 DIAGNOSIS — E66.01 MORBID (SEVERE) OBESITY DUE TO EXCESS CALORIES (MULTI): ICD-10-CM

## 2025-01-18 DIAGNOSIS — R10.84 GENERALIZED ABDOMINAL PAIN: ICD-10-CM

## 2025-01-18 DIAGNOSIS — E11.9 DIABETES MELLITUS WITHOUT COMPLICATION (MULTI): ICD-10-CM

## 2025-01-18 DIAGNOSIS — M19.90 ARTHRITIS: ICD-10-CM

## 2025-01-18 DIAGNOSIS — F32.A ANXIETY AND DEPRESSION: ICD-10-CM

## 2025-01-18 DIAGNOSIS — C61 CARCINOMA OF PROSTATE (MULTI): ICD-10-CM

## 2025-01-18 DIAGNOSIS — E78.00 HYPERCHOLESTEROLEMIA: ICD-10-CM

## 2025-01-18 DIAGNOSIS — F10.939 ALCOHOL WITHDRAWAL SYNDROME WITH COMPLICATION (MULTI): ICD-10-CM

## 2025-01-18 DIAGNOSIS — I10 PRIMARY HYPERTENSION: ICD-10-CM

## 2025-01-18 DIAGNOSIS — G40.909 SEIZURE DISORDER (MULTI): ICD-10-CM

## 2025-01-18 DIAGNOSIS — I50.32 CHRONIC DIASTOLIC HEART FAILURE: ICD-10-CM

## 2025-01-18 DIAGNOSIS — M54.9 BACK PAIN, UNSPECIFIED BACK LOCATION, UNSPECIFIED BACK PAIN LATERALITY, UNSPECIFIED CHRONICITY: Primary | ICD-10-CM

## 2025-01-18 DIAGNOSIS — F41.9 ANXIETY AND DEPRESSION: ICD-10-CM

## 2025-01-18 DIAGNOSIS — R79.89 INCREASED AMMONIA LEVEL: ICD-10-CM

## 2025-01-18 DIAGNOSIS — I48.91 ATRIAL FIBRILLATION WITH CONTROLLED VENTRICULAR RATE (MULTI): ICD-10-CM

## 2025-01-18 RX ORDER — GABAPENTIN 400 MG/1
400 CAPSULE ORAL 3 TIMES DAILY
Qty: 90 CAPSULE | Refills: 0 | Status: SHIPPED | OUTPATIENT
Start: 2025-01-18 | End: 2026-01-18

## 2025-01-18 RX ORDER — AMIODARONE HYDROCHLORIDE 200 MG/1
200 TABLET ORAL DAILY
Qty: 90 TABLET | Refills: 0 | Status: SHIPPED | OUTPATIENT
Start: 2025-01-18

## 2025-01-18 ASSESSMENT — ENCOUNTER SYMPTOMS
LOSS OF SENSATION IN FEET: 0
DEPRESSION: 0
OCCASIONAL FEELINGS OF UNSTEADINESS: 0

## 2025-01-18 NOTE — PROGRESS NOTES
"Subjective   Patient ID: Dilshad Freeman is a 64 y.o. male who presents for Follow-up.    This gentleman today came here for follow-up.  He had a blood work.  He was in [facility] for _____[facility]  He is getting over it.  He is feeling okay.  Pain is under controlled.  He feels lonely.  He lives by himself.    I have personally reviewed the patient's Past Medical History, Medications, Allergies, Social History, and Family History in the EMR.    Review of Systems   All other systems reviewed and are negative.    Objective   /80   Ht 1.727 m (5' 8\")   Wt 119 kg (262 lb)   BMI 39.84 kg/m²     Physical Exam  Vitals reviewed.   Cardiovascular:      Heart sounds: Normal heart sounds, S1 normal and S2 normal. No murmur heard.     No friction rub.   Pulmonary:      Effort: Pulmonary effort is normal.      Breath sounds: Normal breath sounds and air entry.   Abdominal:      Palpations: There is no hepatomegaly, splenomegaly or mass.   Musculoskeletal:      Right lower leg: No edema.      Left lower leg: No edema.   Lymphadenopathy:      Lower Body: No right inguinal adenopathy. No left inguinal adenopathy.   Neurological:      Cranial Nerves: Cranial nerves 2-12 are intact.      Sensory: No sensory deficit.      Motor: Motor function is intact.      Deep Tendon Reflexes: Reflexes are normal and symmetric.     LAB WORK:  Laboratory testing discussed.    Assessment/Plan   Problem List Items Addressed This Visit             ICD-10-CM       Cardiac and Vasculature    Hypercholesterolemia E78.00    Relevant Orders    Comprehensive Metabolic Panel    Lipid Panel    Hypertension I10    Relevant Orders    CBC    Thyroid Stimulating Hormone    Urinalysis with Reflex Microscopic    Chronic diastolic heart failure I50.32    Relevant Orders    Disability Placard       Gastrointestinal and Abdominal    Abdominal pain R10.9    Relevant Medications    gabapentin (Neurontin) 400 mg capsule       Musculoskeletal and Injuries    " Arthritis M19.90     Other Visit Diagnoses         Codes    Back pain, unspecified back location, unspecified back pain laterality, unspecified chronicity    -  Primary M54.9    Atrial fibrillation with controlled ventricular rate (Multi)     I48.91    Relevant Medications    amiodarone (Pacerone) 200 mg tablet    Anxiety and depression     F41.9, F32.A    Increased ammonia level     R79.89        1. Status post ______ admission for alcohol, doing better.  He is taking this drug, which is helping him.  2. Hypertension, okay.  3. High cholesterol, stable.  4. Back pain, stable.  5. ______ okay.  6. ______ okay.  7. Arthritis, okay.  Monitor.  8. Anxiety and depression, on medication.  9. Blood work reviewed.  10. Ammonia level okay.  11. The patient’s major issue is he is feeling lonely, lives by himself.  I advised some company, but he definitely not suicidal.  If needed, he will call me right away.  Otherwise, I will see him in a month with repeat test.    Dennis[employer] Attestation  By signing my name below, I, Jamison Sibley attest that this documentation has been prepared under the direction and in the presence of Yahaira Alcantar MD.     All medical record entries made by the jamison were personally dictated by me I have reviewed the chart and agree the record accurately reflects my personal performance of his history physical examination and management

## 2025-02-02 PROCEDURE — RXMED WILLOW AMBULATORY MEDICATION CHARGE

## 2025-02-04 ENCOUNTER — PHARMACY VISIT (OUTPATIENT)
Dept: PHARMACY | Facility: CLINIC | Age: 65
End: 2025-02-04
Payer: COMMERCIAL

## 2025-02-20 LAB
ALBUMIN SERPL-MCNC: 3.8 G/DL (ref 3.6–5.1)
ALP SERPL-CCNC: 89 U/L (ref 35–144)
ALT SERPL-CCNC: 29 U/L (ref 9–46)
ANION GAP SERPL CALCULATED.4IONS-SCNC: 6 MMOL/L (CALC) (ref 7–17)
AST SERPL-CCNC: 29 U/L (ref 10–35)
BILIRUB SERPL-MCNC: 0.5 MG/DL (ref 0.2–1.2)
BUN SERPL-MCNC: 22 MG/DL (ref 7–25)
CALCIUM SERPL-MCNC: 9 MG/DL (ref 8.6–10.3)
CHLORIDE SERPL-SCNC: 107 MMOL/L (ref 98–110)
CHOLEST SERPL-MCNC: 130 MG/DL
CHOLEST/HDLC SERPL: 2.7 (CALC)
CO2 SERPL-SCNC: 27 MMOL/L (ref 20–32)
CREAT SERPL-MCNC: 1.4 MG/DL (ref 0.7–1.35)
EGFRCR SERPLBLD CKD-EPI 2021: 55 ML/MIN/1.73M2
ERYTHROCYTE [DISTWIDTH] IN BLOOD BY AUTOMATED COUNT: 13.2 % (ref 11–15)
GLUCOSE SERPL-MCNC: 94 MG/DL (ref 65–99)
HCT VFR BLD AUTO: 36.5 % (ref 38.5–50)
HDLC SERPL-MCNC: 49 MG/DL
HGB BLD-MCNC: 11.4 G/DL (ref 13.2–17.1)
LDLC SERPL CALC-MCNC: 67 MG/DL (CALC)
MCH RBC QN AUTO: 31.2 PG (ref 27–33)
MCHC RBC AUTO-ENTMCNC: 31.2 G/DL (ref 32–36)
MCV RBC AUTO: 100 FL (ref 80–100)
NONHDLC SERPL-MCNC: 81 MG/DL (CALC)
PLATELET # BLD AUTO: 262 THOUSAND/UL (ref 140–400)
PMV BLD REES-ECKER: 9.1 FL (ref 7.5–12.5)
POTASSIUM SERPL-SCNC: 4.7 MMOL/L (ref 3.5–5.3)
PROT SERPL-MCNC: 6.4 G/DL (ref 6.1–8.1)
RBC # BLD AUTO: 3.65 MILLION/UL (ref 4.2–5.8)
SODIUM SERPL-SCNC: 140 MMOL/L (ref 135–146)
TRIGL SERPL-MCNC: 62 MG/DL
TSH SERPL-ACNC: 1.55 MIU/L (ref 0.4–4.5)
WBC # BLD AUTO: 4.5 THOUSAND/UL (ref 3.8–10.8)

## 2025-02-22 ENCOUNTER — APPOINTMENT (OUTPATIENT)
Dept: PRIMARY CARE | Facility: CLINIC | Age: 65
End: 2025-02-22
Payer: MEDICARE

## 2025-02-22 VITALS
HEIGHT: 68 IN | DIASTOLIC BLOOD PRESSURE: 70 MMHG | BODY MASS INDEX: 39.71 KG/M2 | WEIGHT: 262 LBS | SYSTOLIC BLOOD PRESSURE: 132 MMHG

## 2025-02-22 DIAGNOSIS — G89.4 CHRONIC PAIN SYNDROME: ICD-10-CM

## 2025-02-22 DIAGNOSIS — Z79.01 CURRENT USE OF LONG TERM ANTICOAGULATION: ICD-10-CM

## 2025-02-22 DIAGNOSIS — D64.9 ANEMIA, UNSPECIFIED TYPE: ICD-10-CM

## 2025-02-22 DIAGNOSIS — E11.9 DIABETES MELLITUS WITHOUT COMPLICATION (MULTI): Primary | ICD-10-CM

## 2025-02-22 DIAGNOSIS — R10.84 GENERALIZED ABDOMINAL PAIN: ICD-10-CM

## 2025-02-22 DIAGNOSIS — N18.30 CHRONIC RENAL FAILURE, STAGE 3 (MODERATE), UNSPECIFIED WHETHER STAGE 3A OR 3B CKD (MULTI): ICD-10-CM

## 2025-02-22 DIAGNOSIS — E78.00 HYPERCHOLESTEROLEMIA: ICD-10-CM

## 2025-02-22 DIAGNOSIS — I10 PRIMARY HYPERTENSION: ICD-10-CM

## 2025-02-22 DIAGNOSIS — I25.10 CORONARY ARTERY DISEASE, UNSPECIFIED VESSEL OR LESION TYPE, UNSPECIFIED WHETHER ANGINA PRESENT, UNSPECIFIED WHETHER NATIVE OR TRANSPLANTED HEART: ICD-10-CM

## 2025-02-22 DIAGNOSIS — N32.81 OVERACTIVE BLADDER: ICD-10-CM

## 2025-02-22 PROCEDURE — 3078F DIAST BP <80 MM HG: CPT | Performed by: INTERNAL MEDICINE

## 2025-02-22 PROCEDURE — 4010F ACE/ARB THERAPY RXD/TAKEN: CPT | Performed by: INTERNAL MEDICINE

## 2025-02-22 PROCEDURE — G2211 COMPLEX E/M VISIT ADD ON: HCPCS | Performed by: INTERNAL MEDICINE

## 2025-02-22 PROCEDURE — 1159F MED LIST DOCD IN RCRD: CPT | Performed by: INTERNAL MEDICINE

## 2025-02-22 PROCEDURE — 1157F ADVNC CARE PLAN IN RCRD: CPT | Performed by: INTERNAL MEDICINE

## 2025-02-22 PROCEDURE — 99214 OFFICE O/P EST MOD 30 MIN: CPT | Performed by: INTERNAL MEDICINE

## 2025-02-22 PROCEDURE — 3008F BODY MASS INDEX DOCD: CPT | Performed by: INTERNAL MEDICINE

## 2025-02-22 PROCEDURE — 3075F SYST BP GE 130 - 139MM HG: CPT | Performed by: INTERNAL MEDICINE

## 2025-02-22 RX ORDER — LOSARTAN POTASSIUM 50 MG/1
50 TABLET ORAL DAILY
Qty: 90 TABLET | Refills: 0 | Status: SHIPPED | OUTPATIENT
Start: 2025-02-22

## 2025-02-22 RX ORDER — GABAPENTIN 400 MG/1
400 CAPSULE ORAL 3 TIMES DAILY
Qty: 90 CAPSULE | Refills: 0 | Status: SHIPPED | OUTPATIENT
Start: 2025-02-22 | End: 2025-02-27 | Stop reason: SDUPTHER

## 2025-02-22 RX ORDER — SOLIFENACIN SUCCINATE 10 MG/1
10 TABLET, FILM COATED ORAL DAILY
Qty: 90 TABLET | Refills: 1 | Status: SHIPPED | OUTPATIENT
Start: 2025-02-22 | End: 2025-02-27 | Stop reason: SDUPTHER

## 2025-02-22 ASSESSMENT — ENCOUNTER SYMPTOMS
OCCASIONAL FEELINGS OF UNSTEADINESS: 0
DEPRESSION: 0
LOSS OF SENSATION IN FEET: 0

## 2025-02-22 NOTE — PROGRESS NOTES
"Subjective   Patient ID: Dilshad Freeman is a 64 y.o. male who presents for Pain.    This gentleman, Pete today came here for multiple medical issues.  He is very tired, fatigued, and exhausted.  He lives by himself.  He is trying to get rid of Dr. Arana, who is already retired ______ but I am going to reduce it.  Pain is under controlled.    I have personally reviewed the patient's Past Medical History, Medications, Allergies, Social History, and Family History in the EMR.    Review of Systems   All other systems reviewed and are negative.    Objective   /70   Ht 1.727 m (5' 8\")   Wt 119 kg (262 lb)   BMI 39.84 kg/m²     Physical Exam  Vitals reviewed.   Cardiovascular:      Heart sounds: Normal heart sounds, S1 normal and S2 normal. No murmur heard.     No friction rub.   Pulmonary:      Effort: Pulmonary effort is normal.      Breath sounds: Normal breath sounds and air entry.   Abdominal:      Palpations: There is no hepatomegaly, splenomegaly or mass.   Musculoskeletal:      Right lower leg: No edema.      Left lower leg: No edema.   Lymphadenopathy:      Lower Body: No right inguinal adenopathy. No left inguinal adenopathy.   Neurological:      Cranial Nerves: Cranial nerves 2-12 are intact.      Sensory: No sensory deficit.      Motor: Motor function is intact.      Deep Tendon Reflexes: Reflexes are normal and symmetric.     LAB WORK: Laboratory testing discussed.    Assessment/Plan   Problem List Items Addressed This Visit             ICD-10-CM       Cardiac and Vasculature    Hypercholesterolemia E78.00    Hypertension I10    Relevant Medications    losartan (Cozaar) 50 mg tablet    Other Relevant Orders    CBC and Auto Differential    Lactate dehydrogenase    CAD (coronary artery disease) I25.10       Endocrine/Metabolic    Diabetes mellitus without complication (Multi) - Primary E11.9    Relevant Medications    solifenacin (Vesicare) 10 mg tablet       Gastrointestinal and Abdominal    " Abdominal pain R10.9    Relevant Medications    gabapentin (Neurontin) 400 mg capsule    Other Relevant Orders    Lactate dehydrogenase    Comprehensive Metabolic Panel       Hematology and Neoplasia    Anemia D64.9    Relevant Orders    Iron    Vitamin B12    Referral to Gastroenterology     Other Visit Diagnoses         Codes    Chronic renal failure, stage 3 (moderate), unspecified whether stage 3a or 3b CKD (Multi)     N18.30    Overactive bladder     N32.81    Chronic pain syndrome     G89.4    Current use of long term anticoagulation     Z79.01        1. Anemia.  His hemoglobin is going slowly down.  I think that is the reason for being tired.  I am going to order anemia workup.  Refer to GI for upper and lower endoscopy.  Anemia workup ordered.  2. Chronic renal failure, stage 3.  I am going to monitor his kidney function.  3. Overactive bladder.  I will prescribe him VESIcare.  No need to see urologist at the moment.  4. Hypertension, okay.  5. High cholesterol, okay.  6. Chronic pain syndrome, on medication.  7. Coronary artery disease.  Monitor.  8. Anticoagulation, on lifetime Eliquis.  9. Follow-up appointment with me in four weeks with repeat test.    Jamison Attestation  By signing my name below, I, Jamison Sibley attest that this documentation has been prepared under the direction and in the presence of Yahaira Alcantar MD.     All medical record entries made by the jamison were personally dictated by me I have reviewed the chart and agree the record accurately reflects my personal performance of his history physical examination and management

## 2025-02-27 DIAGNOSIS — E11.9 DIABETES MELLITUS WITHOUT COMPLICATION (MULTI): ICD-10-CM

## 2025-02-27 DIAGNOSIS — R10.84 GENERALIZED ABDOMINAL PAIN: ICD-10-CM

## 2025-02-27 RX ORDER — GABAPENTIN 400 MG/1
400 CAPSULE ORAL 3 TIMES DAILY
Qty: 90 CAPSULE | Refills: 0 | Status: SHIPPED | OUTPATIENT
Start: 2025-02-27 | End: 2026-02-27

## 2025-02-27 RX ORDER — SOLIFENACIN SUCCINATE 10 MG/1
10 TABLET, FILM COATED ORAL DAILY
Qty: 90 TABLET | Refills: 1 | Status: SHIPPED | OUTPATIENT
Start: 2025-02-27 | End: 2025-05-28

## 2025-03-06 ENCOUNTER — APPOINTMENT (OUTPATIENT)
Dept: GASTROENTEROLOGY | Facility: CLINIC | Age: 65
End: 2025-03-06
Payer: MEDICARE

## 2025-03-06 VITALS — HEART RATE: 63 BPM | OXYGEN SATURATION: 96 % | BODY MASS INDEX: 40.29 KG/M2 | WEIGHT: 265 LBS

## 2025-03-06 DIAGNOSIS — D50.9 IRON DEFICIENCY ANEMIA, UNSPECIFIED IRON DEFICIENCY ANEMIA TYPE: Primary | ICD-10-CM

## 2025-03-06 DIAGNOSIS — K62.5 RECTAL BLEEDING: ICD-10-CM

## 2025-03-06 RX ORDER — POLYETHYLENE GLYCOL-3350 AND ELECTROLYTES WITH FLAVOR PACK 240; 5.84; 2.98; 6.72; 22.72 G/278.26G; G/278.26G; G/278.26G; G/278.26G; G/278.26G
4000 POWDER, FOR SOLUTION ORAL ONCE
Qty: 4000 ML | Refills: 0 | Status: SHIPPED | OUTPATIENT
Start: 2025-03-06 | End: 2025-03-06

## 2025-03-06 NOTE — PROGRESS NOTES
REASON FOR VISIT: Anemia    HPI:  Dilshad Freeman is a 64 y.o. male with a past medical history of atrial fibrillation on Eliquis being evaluated in the office for anemia.  Chronically on iron supplementation.  Noted to have mild anemia.  Has had some blood on stool and toilet paper.  Last colonoscopy 6 years ago with suboptimal preparation at that time told to repeat in 3 years.  Has also sleep apnea on CPAP.  Last hematocrit 36.  History of gastric sleeve as well 2 years ago and has lost 100 pounds and kept most of that weight off.  No focal abdominal pain.          PRIOR ENDOSCOPY    PAST MEDICAL HISTORY  Past Medical History:   Diagnosis Date    Anxiety     Atrial fibrillation (Multi)     Body mass index (BMI)40.0-44.9, adult 04/26/2021    BMI 40.0-44.9, adult    BPH (benign prostatic hyperplasia)     Depression     H/O knee surgery     High cholesterol     History of back surgery     HX: anticoagulation     Hypertension     Personal history of alcoholism (Multi)     Prostate cancer (Multi)     Stress        PAST SURGICAL HISTORY  Past Surgical History:   Procedure Laterality Date    BACK SURGERY      OTHER SURGICAL HISTORY  07/01/2013    Reported Hx Of Hip Replacement - Left Side    OTHER SURGICAL HISTORY  07/01/2013    Reported Hx Of Hip Replacement - Right Side       FAMILY HISTORY  Family History   Family history unknown: Yes       SOCIAL HISTORY  Social History     Tobacco Use    Smoking status: Never    Smokeless tobacco: Never   Substance Use Topics    Alcohol use: Never       REVIEW OF SYSTEMS  CONSTITUTIONAL: negative for fever, chills, fatigue, or unintentional weight loss,   HEENT: negative for icteric sclera, eye pain/redness, or changes in vision/hearing  RESPIRATORY: negative for cough, hemoptysis, wheezing, orthopnea, or dyspnea on exertion  CARDIOVASCULAR: negative for chest pain, palpitations, or syncope   GASTROINTESTINAL: as noted per HPI  GENITOURINARY: negative for dysuria, polyuria,  incontinence, or hematuria  MUSCULOSKELETAL: negative for arthralgia, myalgia, or joint swelling/stiffness   INTEGUMENTARY/SKIN: negative jaundice, rash, or skin lesion  HEMATOLOGIC/LYMPHATIC: negative for prolonged bleeding, easy bruising, or swollen lymph nodes  ENDOCRINE: negative for cold/heat intolerance, polydipsia, polyuria, or goiter  NEUROLOGIC: negative for headaches, dizziness, tremor, or gait abnormality  PSYCHIATRIC: negative for anxiety, depression, personality changes, or sleep disturbances      A 10 point review of systems was completed and was otherwise negative.    ALLERGIES  No Known Allergies    MEDICATIONS  Current Outpatient Medications   Medication Sig Dispense Refill    amiodarone (Pacerone) 200 mg tablet Take 1 tablet (200 mg) by mouth once daily. 90 tablet 0    apixaban (Eliquis) 5 mg tablet TAKE 1 TABLET BY MOUTH EVERY 12 HOURS 180 tablet 3    aspirin 81 mg chewable tablet Chew.      cloNIDine (Catapres) 0.1 mg tablet TAKE 1 TABLET (0.1 MG) BY MOUTH 3 TIMES A DAY. 270 tablet 0    escitalopram (Lexapro) 10 mg tablet Take 2 tablets (20 mg) by mouth once daily.      ezetimibe (Zetia) 10 mg tablet TAKE 1 TABLET BY MOUTH EVERY DAY 90 tablet 0    ferrous sulfate 324 mg (65 mg elemental iron) EC tablet (delayed release) Take 1 tablet (65 mg) by mouth once daily. 90 tablet 0    gabapentin (Neurontin) 400 mg capsule Take 1 capsule (400 mg) by mouth 3 times a day. 90 capsule 0    losartan (Cozaar) 50 mg tablet Take 1 tablet (50 mg) by mouth once daily. 90 tablet 0    metoprolol tartrate (Lopressor) 50 mg tablet TAKE 1 TABLET BY MOUTH TWICE A  tablet 0    mirtazapine (Remeron) 45 mg tablet Take 1 tablet (45 mg) by mouth once daily at bedtime. 90 tablet 0    naltrexone (Depade) 50 mg tablet Take 1 tablet (50 mg) by mouth once daily. 90 tablet 0    polyethylene glycol (GaviLyte-C) 240-22.72-6.72 -5.84 gram solution Take 4,000 mL by mouth 1 time for 1 dose. Drink 8 oz every 10-15 minutes until  solution is gone 4000 mL 0    rosuvastatin (Crestor) 40 mg tablet Take 1 tablet (40 mg) by mouth once daily. 90 tablet 0    solifenacin (VESIcare) 10 mg tablet Take 1 tablet (10 mg) by mouth once daily.      solifenacin (Vesicare) 10 mg tablet Take 1 tablet (10 mg) by mouth once daily. Swallow tablet whole; do not crush, chew, or split. 90 tablet 1     No current facility-administered medications for this visit.       VITALS  Pulse 63   Wt 120 kg (265 lb)   SpO2 96%   BMI 40.29 kg/m²      PHYSICAL EXAM  Alert and oriented in no acute distress    ASSESSMENT/ PLAN  Patient with mild anemia with chronic iron supplementation.  Hard to interpret iron serologies given his chronic iron supplementation at this time.  Has had some blood in stool as well.  Recommended upper endoscopy and colonoscopy to rule out underlying neoplasia, peptic ulcers or other sources of chronic blood loss.  He will hold Eliquis 2 days prior to endoscopy.  He is in agreement with the plan.        Signature: Fide Shields MD    Date: 3/6/2025  Time: 3:44 PM

## 2025-03-18 ENCOUNTER — APPOINTMENT (OUTPATIENT)
Dept: RADIOLOGY | Facility: HOSPITAL | Age: 65
DRG: 309 | End: 2025-03-18
Payer: MEDICARE

## 2025-03-18 ENCOUNTER — HOSPITAL ENCOUNTER (INPATIENT)
Facility: HOSPITAL | Age: 65
DRG: 309 | End: 2025-03-18
Attending: EMERGENCY MEDICINE | Admitting: INTERNAL MEDICINE
Payer: MEDICARE

## 2025-03-18 ENCOUNTER — APPOINTMENT (OUTPATIENT)
Dept: CARDIOLOGY | Facility: HOSPITAL | Age: 65
DRG: 309 | End: 2025-03-18
Payer: MEDICARE

## 2025-03-18 DIAGNOSIS — R10.84 GENERALIZED ABDOMINAL PAIN: ICD-10-CM

## 2025-03-18 DIAGNOSIS — I10 HYPERTENSION, UNSPECIFIED TYPE: ICD-10-CM

## 2025-03-18 DIAGNOSIS — E86.0 DEHYDRATION: ICD-10-CM

## 2025-03-18 DIAGNOSIS — I48.0 PAROXYSMAL ATRIAL FIBRILLATION (MULTI): Primary | ICD-10-CM

## 2025-03-18 DIAGNOSIS — R00.1 BRADYCARDIA: ICD-10-CM

## 2025-03-18 DIAGNOSIS — N17.9 AKI (ACUTE KIDNEY INJURY): ICD-10-CM

## 2025-03-18 DIAGNOSIS — R00.1 SYMPTOMATIC BRADYCARDIA: ICD-10-CM

## 2025-03-18 PROBLEM — G25.2 COARSE TREMORS: Status: ACTIVE | Noted: 2025-03-18
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
## 2025-03-18 LAB
ALBUMIN SERPL BCP-MCNC: 3.7 G/DL (ref 3.4–5)
ALP SERPL-CCNC: 70 U/L (ref 33–136)
ALT SERPL W P-5'-P-CCNC: 27 U/L (ref 10–52)
AMPHETAMINES UR QL SCN: NORMAL
ANION GAP SERPL CALCULATED.3IONS-SCNC: 8 MMOL/L (ref 10–20)
APAP SERPL-MCNC: <10 UG/ML
APPEARANCE UR: CLEAR
AST SERPL W P-5'-P-CCNC: 28 U/L (ref 9–39)
BARBITURATES UR QL SCN: NORMAL
BASOPHILS # BLD AUTO: 0.03 X10*3/UL (ref 0–0.1)
BASOPHILS NFR BLD AUTO: 0.6 %
BENZODIAZ UR QL SCN: NORMAL
BILIRUB SERPL-MCNC: 0.4 MG/DL (ref 0–1.2)
BILIRUB UR STRIP.AUTO-MCNC: NEGATIVE MG/DL
BUN SERPL-MCNC: 31 MG/DL (ref 6–23)
BZE UR QL SCN: NORMAL
CALCIUM SERPL-MCNC: 8.6 MG/DL (ref 8.6–10.3)
CANNABINOIDS UR QL SCN: NORMAL
CHLORIDE SERPL-SCNC: 103 MMOL/L (ref 98–107)
CO2 SERPL-SCNC: 28 MMOL/L (ref 21–32)
COLOR UR: NORMAL
CREAT SERPL-MCNC: 2.75 MG/DL (ref 0.5–1.3)
EGFRCR SERPLBLD CKD-EPI 2021: 25 ML/MIN/1.73M*2
EOSINOPHIL # BLD AUTO: 0.25 X10*3/UL (ref 0–0.7)
EOSINOPHIL NFR BLD AUTO: 4.9 %
ERYTHROCYTE [DISTWIDTH] IN BLOOD BY AUTOMATED COUNT: 13.2 % (ref 11.5–14.5)
ETHANOL SERPL-MCNC: <10 MG/DL
FENTANYL+NORFENTANYL UR QL SCN: NORMAL
GLUCOSE SERPL-MCNC: 87 MG/DL (ref 74–99)
GLUCOSE UR STRIP.AUTO-MCNC: NORMAL MG/DL
HCT VFR BLD AUTO: 37 % (ref 41–52)
HGB BLD-MCNC: 12 G/DL (ref 13.5–17.5)
IMM GRANULOCYTES # BLD AUTO: 0.02 X10*3/UL (ref 0–0.7)
IMM GRANULOCYTES NFR BLD AUTO: 0.4 % (ref 0–0.9)
KETONES UR STRIP.AUTO-MCNC: NEGATIVE MG/DL
LEUKOCYTE ESTERASE UR QL STRIP.AUTO: NEGATIVE
LYMPHOCYTES # BLD AUTO: 1.34 X10*3/UL (ref 1.2–4.8)
LYMPHOCYTES NFR BLD AUTO: 26.4 %
MAGNESIUM SERPL-MCNC: 2.28 MG/DL (ref 1.6–2.4)
MCH RBC QN AUTO: 30.7 PG (ref 26–34)
MCHC RBC AUTO-ENTMCNC: 32.4 G/DL (ref 32–36)
MCV RBC AUTO: 95 FL (ref 80–100)
METHADONE UR QL SCN: NORMAL
MONOCYTES # BLD AUTO: 0.46 X10*3/UL (ref 0.1–1)
MONOCYTES NFR BLD AUTO: 9.1 %
NEUTROPHILS # BLD AUTO: 2.98 X10*3/UL (ref 1.2–7.7)
NEUTROPHILS NFR BLD AUTO: 58.6 %
NITRITE UR QL STRIP.AUTO: NEGATIVE
NRBC BLD-RTO: 0 /100 WBCS (ref 0–0)
OPIATES UR QL SCN: NORMAL
OXYCODONE+OXYMORPHONE UR QL SCN: NORMAL
PCP UR QL SCN: NORMAL
PH UR STRIP.AUTO: 5.5 [PH]
PLATELET # BLD AUTO: 251 X10*3/UL (ref 150–450)
POTASSIUM SERPL-SCNC: 5.4 MMOL/L (ref 3.5–5.3)
PROT SERPL-MCNC: 6.5 G/DL (ref 6.4–8.2)
PROT UR STRIP.AUTO-MCNC: NEGATIVE MG/DL
RBC # BLD AUTO: 3.91 X10*6/UL (ref 4.5–5.9)
RBC # UR STRIP.AUTO: NEGATIVE MG/DL
SALICYLATES SERPL-MCNC: <3 MG/DL
SODIUM SERPL-SCNC: 134 MMOL/L (ref 136–145)
SP GR UR STRIP.AUTO: 1.01
TSH SERPL-ACNC: 1.77 MIU/L (ref 0.44–3.98)
UROBILINOGEN UR STRIP.AUTO-MCNC: NORMAL MG/DL
WBC # BLD AUTO: 5.1 X10*3/UL (ref 4.4–11.3)

## 2025-03-18 PROCEDURE — 96365 THER/PROPH/DIAG IV INF INIT: CPT

## 2025-03-18 PROCEDURE — 71045 X-RAY EXAM CHEST 1 VIEW: CPT

## 2025-03-18 PROCEDURE — 80143 DRUG ASSAY ACETAMINOPHEN: CPT | Performed by: EMERGENCY MEDICINE

## 2025-03-18 PROCEDURE — 76770 US EXAM ABDO BACK WALL COMP: CPT

## 2025-03-18 PROCEDURE — 81003 URINALYSIS AUTO W/O SCOPE: CPT | Performed by: PHYSICIAN ASSISTANT

## 2025-03-18 PROCEDURE — 2500000004 HC RX 250 GENERAL PHARMACY W/ HCPCS (ALT 636 FOR OP/ED): Performed by: EMERGENCY MEDICINE

## 2025-03-18 PROCEDURE — 99222 1ST HOSP IP/OBS MODERATE 55: CPT | Performed by: INTERNAL MEDICINE

## 2025-03-18 PROCEDURE — 2500000001 HC RX 250 WO HCPCS SELF ADMINISTERED DRUGS (ALT 637 FOR MEDICARE OP): Performed by: INTERNAL MEDICINE

## 2025-03-18 PROCEDURE — 80053 COMPREHEN METABOLIC PANEL: CPT | Performed by: EMERGENCY MEDICINE

## 2025-03-18 PROCEDURE — 76770 US EXAM ABDO BACK WALL COMP: CPT | Performed by: RADIOLOGY

## 2025-03-18 PROCEDURE — 36415 COLL VENOUS BLD VENIPUNCTURE: CPT | Performed by: PHYSICIAN ASSISTANT

## 2025-03-18 PROCEDURE — 99285 EMERGENCY DEPT VISIT HI MDM: CPT | Mod: 25 | Performed by: EMERGENCY MEDICINE

## 2025-03-18 PROCEDURE — 36415 COLL VENOUS BLD VENIPUNCTURE: CPT | Performed by: EMERGENCY MEDICINE

## 2025-03-18 PROCEDURE — 83735 ASSAY OF MAGNESIUM: CPT | Performed by: EMERGENCY MEDICINE

## 2025-03-18 PROCEDURE — 2500000004 HC RX 250 GENERAL PHARMACY W/ HCPCS (ALT 636 FOR OP/ED): Performed by: PHYSICIAN ASSISTANT

## 2025-03-18 PROCEDURE — 85025 COMPLETE CBC W/AUTO DIFF WBC: CPT | Performed by: EMERGENCY MEDICINE

## 2025-03-18 PROCEDURE — 2500000004 HC RX 250 GENERAL PHARMACY W/ HCPCS (ALT 636 FOR OP/ED): Performed by: INTERNAL MEDICINE

## 2025-03-18 PROCEDURE — 70450 CT HEAD/BRAIN W/O DYE: CPT | Performed by: RADIOLOGY

## 2025-03-18 PROCEDURE — 93005 ELECTROCARDIOGRAM TRACING: CPT

## 2025-03-18 PROCEDURE — 96375 TX/PRO/DX INJ NEW DRUG ADDON: CPT

## 2025-03-18 PROCEDURE — 84443 ASSAY THYROID STIM HORMONE: CPT | Performed by: EMERGENCY MEDICINE

## 2025-03-18 PROCEDURE — 2060000001 HC INTERMEDIATE ICU ROOM DAILY

## 2025-03-18 PROCEDURE — 80307 DRUG TEST PRSMV CHEM ANLYZR: CPT | Performed by: PHYSICIAN ASSISTANT

## 2025-03-18 PROCEDURE — 72125 CT NECK SPINE W/O DYE: CPT | Performed by: RADIOLOGY

## 2025-03-18 PROCEDURE — 70450 CT HEAD/BRAIN W/O DYE: CPT

## 2025-03-18 PROCEDURE — 72125 CT NECK SPINE W/O DYE: CPT

## 2025-03-18 RX ORDER — ESCITALOPRAM OXALATE 20 MG/1
20 TABLET ORAL DAILY
Status: DISCONTINUED | OUTPATIENT
Start: 2025-03-19 | End: 2025-03-24 | Stop reason: HOSPADM

## 2025-03-18 RX ORDER — ENOXAPARIN SODIUM 100 MG/ML
40 INJECTION SUBCUTANEOUS EVERY 24 HOURS
Status: DISCONTINUED | OUTPATIENT
Start: 2025-03-18 | End: 2025-03-18 | Stop reason: SDUPTHER

## 2025-03-18 RX ORDER — ACETAMINOPHEN 325 MG/1
650 TABLET ORAL EVERY 4 HOURS PRN
Status: DISCONTINUED | OUTPATIENT
Start: 2025-03-18 | End: 2025-03-24 | Stop reason: HOSPADM

## 2025-03-18 RX ORDER — ATROPINE SULFATE 1 MG/ML
1 INJECTION, SOLUTION INTRAVENOUS ONCE
Status: COMPLETED | OUTPATIENT
Start: 2025-03-18 | End: 2025-03-18

## 2025-03-18 RX ORDER — NAPROXEN SODIUM 220 MG/1
81 TABLET, FILM COATED ORAL NIGHTLY
Status: DISCONTINUED | OUTPATIENT
Start: 2025-03-18 | End: 2025-03-24 | Stop reason: HOSPADM

## 2025-03-18 RX ORDER — GUAIFENESIN/DEXTROMETHORPHAN 100-10MG/5
5 SYRUP ORAL EVERY 4 HOURS PRN
Status: DISCONTINUED | OUTPATIENT
Start: 2025-03-18 | End: 2025-03-19

## 2025-03-18 RX ORDER — DOCUSATE SODIUM 100 MG/1
100 CAPSULE, LIQUID FILLED ORAL 2 TIMES DAILY
Status: DISCONTINUED | OUTPATIENT
Start: 2025-03-18 | End: 2025-03-24 | Stop reason: HOSPADM

## 2025-03-18 RX ORDER — MAGNESIUM SULFATE HEPTAHYDRATE 40 MG/ML
2 INJECTION, SOLUTION INTRAVENOUS ONCE
Status: COMPLETED | OUTPATIENT
Start: 2025-03-18 | End: 2025-03-18

## 2025-03-18 RX ORDER — TALC
3 POWDER (GRAM) TOPICAL NIGHTLY PRN
Status: DISCONTINUED | OUTPATIENT
Start: 2025-03-18 | End: 2025-03-24 | Stop reason: HOSPADM

## 2025-03-18 RX ORDER — ONDANSETRON 4 MG/1
4 TABLET, FILM COATED ORAL EVERY 8 HOURS PRN
Status: DISCONTINUED | OUTPATIENT
Start: 2025-03-18 | End: 2025-03-24 | Stop reason: HOSPADM

## 2025-03-18 RX ORDER — DEXTROSE MONOHYDRATE AND SODIUM CHLORIDE 5; .45 G/100ML; G/100ML
100 INJECTION, SOLUTION INTRAVENOUS CONTINUOUS
Status: DISCONTINUED | OUTPATIENT
Start: 2025-03-18 | End: 2025-03-19

## 2025-03-18 RX ORDER — CLONIDINE HYDROCHLORIDE 0.1 MG/1
0.1 TABLET ORAL 3 TIMES DAILY
Status: DISCONTINUED | OUTPATIENT
Start: 2025-03-18 | End: 2025-03-24

## 2025-03-18 RX ORDER — GUAIFENESIN 600 MG/1
600 TABLET, EXTENDED RELEASE ORAL EVERY 12 HOURS PRN
Status: DISCONTINUED | OUTPATIENT
Start: 2025-03-18 | End: 2025-03-19

## 2025-03-18 RX ORDER — ACETAMINOPHEN 160 MG/5ML
650 SOLUTION ORAL EVERY 4 HOURS PRN
Status: DISCONTINUED | OUTPATIENT
Start: 2025-03-18 | End: 2025-03-24 | Stop reason: HOSPADM

## 2025-03-18 RX ORDER — AMIODARONE HYDROCHLORIDE 200 MG/1
200 TABLET ORAL DAILY
Status: DISCONTINUED | OUTPATIENT
Start: 2025-03-19 | End: 2025-03-24 | Stop reason: HOSPADM

## 2025-03-18 RX ORDER — FERROUS SULFATE 325(65) MG
1 TABLET ORAL DAILY
Status: DISCONTINUED | OUTPATIENT
Start: 2025-03-19 | End: 2025-03-24 | Stop reason: HOSPADM

## 2025-03-18 RX ORDER — MIRTAZAPINE 15 MG/1
45 TABLET, FILM COATED ORAL NIGHTLY
Status: DISCONTINUED | OUTPATIENT
Start: 2025-03-18 | End: 2025-03-24 | Stop reason: HOSPADM

## 2025-03-18 RX ORDER — CHLORDIAZEPOXIDE HYDROCHLORIDE 25 MG/1
25 CAPSULE, GELATIN COATED ORAL ONCE
Status: DISCONTINUED | OUTPATIENT
Start: 2025-03-18 | End: 2025-03-19

## 2025-03-18 RX ORDER — FAMOTIDINE 10 MG/ML
20 INJECTION, SOLUTION INTRAVENOUS DAILY
Status: DISCONTINUED | OUTPATIENT
Start: 2025-03-19 | End: 2025-03-19

## 2025-03-18 RX ORDER — ONDANSETRON HYDROCHLORIDE 2 MG/ML
4 INJECTION, SOLUTION INTRAVENOUS EVERY 8 HOURS PRN
Status: DISCONTINUED | OUTPATIENT
Start: 2025-03-18 | End: 2025-03-24 | Stop reason: HOSPADM

## 2025-03-18 RX ORDER — EZETIMIBE 10 MG/1
10 TABLET ORAL DAILY
Status: DISCONTINUED | OUTPATIENT
Start: 2025-03-19 | End: 2025-03-24 | Stop reason: HOSPADM

## 2025-03-18 RX ORDER — FAMOTIDINE 20 MG/1
20 TABLET, FILM COATED ORAL DAILY
Status: DISCONTINUED | OUTPATIENT
Start: 2025-03-19 | End: 2025-03-19

## 2025-03-18 RX ORDER — ACETAMINOPHEN 650 MG/1
650 SUPPOSITORY RECTAL EVERY 4 HOURS PRN
Status: DISCONTINUED | OUTPATIENT
Start: 2025-03-18 | End: 2025-03-24 | Stop reason: HOSPADM

## 2025-03-18 RX ORDER — POLYETHYLENE GLYCOL 3350 17 G/17G
17 POWDER, FOR SOLUTION ORAL DAILY
Status: DISCONTINUED | OUTPATIENT
Start: 2025-03-19 | End: 2025-03-24 | Stop reason: HOSPADM

## 2025-03-18 RX ADMIN — DOCUSATE SODIUM 100 MG: 100 CAPSULE, LIQUID FILLED ORAL at 22:31

## 2025-03-18 RX ADMIN — SODIUM CHLORIDE 1000 ML: 9 INJECTION, SOLUTION INTRAVENOUS at 15:58

## 2025-03-18 RX ADMIN — ATROPINE SULFATE 1 MG: 1 INJECTION, SOLUTION INTRAVENOUS at 15:46

## 2025-03-18 RX ADMIN — DEXTROSE MONOHYDRATE AND SODIUM CHLORIDE 100 ML/HR: 5; .45 INJECTION, SOLUTION INTRAVENOUS at 22:32

## 2025-03-18 RX ADMIN — MIRTAZAPINE 45 MG: 15 TABLET, FILM COATED ORAL at 22:31

## 2025-03-18 RX ADMIN — MAGNESIUM SULFATE IN WATER FOR 2 G: 40 INJECTION INTRAVENOUS at 16:04

## 2025-03-18 RX ADMIN — ASPIRIN 81 MG: 81 TABLET, CHEWABLE ORAL at 22:32

## 2025-03-18 RX ADMIN — APIXABAN 5 MG: 5 TABLET, FILM COATED ORAL at 22:31

## 2025-03-18 RX ADMIN — CLONIDINE HYDROCHLORIDE 0.1 MG: 0.1 TABLET ORAL at 22:32

## 2025-03-18 SDOH — SOCIAL STABILITY: SOCIAL INSECURITY: ABUSE: ADULT

## 2025-03-18 SDOH — ECONOMIC STABILITY: FOOD INSECURITY: WITHIN THE PAST 12 MONTHS, YOU WORRIED THAT YOUR FOOD WOULD RUN OUT BEFORE YOU GOT THE MONEY TO BUY MORE.: NEVER TRUE

## 2025-03-18 SDOH — SOCIAL STABILITY: SOCIAL INSECURITY: WITHIN THE LAST YEAR, HAVE YOU BEEN HUMILIATED OR EMOTIONALLY ABUSED IN OTHER WAYS BY YOUR PARTNER OR EX-PARTNER?: NO

## 2025-03-18 SDOH — HEALTH STABILITY: MENTAL HEALTH
DO YOU FEEL STRESS - TENSE, RESTLESS, NERVOUS, OR ANXIOUS, OR UNABLE TO SLEEP AT NIGHT BECAUSE YOUR MIND IS TROUBLED ALL THE TIME - THESE DAYS?: NOT AT ALL

## 2025-03-18 SDOH — SOCIAL STABILITY: SOCIAL INSECURITY: DO YOU FEEL ANYONE HAS EXPLOITED OR TAKEN ADVANTAGE OF YOU FINANCIALLY OR OF YOUR PERSONAL PROPERTY?: NO

## 2025-03-18 SDOH — SOCIAL STABILITY: SOCIAL INSECURITY: ARE YOU OR HAVE YOU BEEN THREATENED OR ABUSED PHYSICALLY, EMOTIONALLY, OR SEXUALLY BY ANYONE?: NO

## 2025-03-18 SDOH — SOCIAL STABILITY: SOCIAL NETWORK
DO YOU BELONG TO ANY CLUBS OR ORGANIZATIONS SUCH AS CHURCH GROUPS, UNIONS, FRATERNAL OR ATHLETIC GROUPS, OR SCHOOL GROUPS?: NO

## 2025-03-18 SDOH — ECONOMIC STABILITY: FOOD INSECURITY: WITHIN THE PAST 12 MONTHS, THE FOOD YOU BOUGHT JUST DIDN'T LAST AND YOU DIDN'T HAVE MONEY TO GET MORE.: NEVER TRUE

## 2025-03-18 SDOH — SOCIAL STABILITY: SOCIAL INSECURITY: DOES ANYONE TRY TO KEEP YOU FROM HAVING/CONTACTING OTHER FRIENDS OR DOING THINGS OUTSIDE YOUR HOME?: NO

## 2025-03-18 SDOH — ECONOMIC STABILITY: INCOME INSECURITY: IN THE PAST 12 MONTHS HAS THE ELECTRIC, GAS, OIL, OR WATER COMPANY THREATENED TO SHUT OFF SERVICES IN YOUR HOME?: NO

## 2025-03-18 SDOH — HEALTH STABILITY: MENTAL HEALTH: HOW OFTEN DO YOU HAVE SIX OR MORE DRINKS ON ONE OCCASION?: NEVER

## 2025-03-18 SDOH — HEALTH STABILITY: PHYSICAL HEALTH: ON AVERAGE, HOW MANY MINUTES DO YOU ENGAGE IN EXERCISE AT THIS LEVEL?: 10 MIN

## 2025-03-18 SDOH — HEALTH STABILITY: PHYSICAL HEALTH: ON AVERAGE, HOW MANY DAYS PER WEEK DO YOU ENGAGE IN MODERATE TO STRENUOUS EXERCISE (LIKE A BRISK WALK)?: 3 DAYS

## 2025-03-18 SDOH — SOCIAL STABILITY: SOCIAL INSECURITY: WITHIN THE LAST YEAR, HAVE YOU BEEN AFRAID OF YOUR PARTNER OR EX-PARTNER?: NO

## 2025-03-18 SDOH — SOCIAL STABILITY: SOCIAL INSECURITY: HAVE YOU HAD THOUGHTS OF HARMING ANYONE ELSE?: NO

## 2025-03-18 SDOH — SOCIAL STABILITY: SOCIAL INSECURITY
WITHIN THE LAST YEAR, HAVE YOU BEEN KICKED, HIT, SLAPPED, OR OTHERWISE PHYSICALLY HURT BY YOUR PARTNER OR EX-PARTNER?: NO

## 2025-03-18 SDOH — SOCIAL STABILITY: SOCIAL INSECURITY: ARE YOU MARRIED, WIDOWED, DIVORCED, SEPARATED, NEVER MARRIED, OR LIVING WITH A PARTNER?: DIVORCED

## 2025-03-18 SDOH — HEALTH STABILITY: PHYSICAL HEALTH
HOW OFTEN DO YOU NEED TO HAVE SOMEONE HELP YOU WHEN YOU READ INSTRUCTIONS, PAMPHLETS, OR OTHER WRITTEN MATERIAL FROM YOUR DOCTOR OR PHARMACY?: RARELY

## 2025-03-18 SDOH — SOCIAL STABILITY: SOCIAL INSECURITY
WITHIN THE LAST YEAR, HAVE YOU BEEN RAPED OR FORCED TO HAVE ANY KIND OF SEXUAL ACTIVITY BY YOUR PARTNER OR EX-PARTNER?: NO

## 2025-03-18 SDOH — SOCIAL STABILITY: SOCIAL NETWORK: HOW OFTEN DO YOU GET TOGETHER WITH FRIENDS OR RELATIVES?: ONCE A WEEK

## 2025-03-18 SDOH — HEALTH STABILITY: MENTAL HEALTH: HOW OFTEN DO YOU HAVE A DRINK CONTAINING ALCOHOL?: NEVER

## 2025-03-18 SDOH — SOCIAL STABILITY: SOCIAL INSECURITY: HAVE YOU HAD ANY THOUGHTS OF HARMING ANYONE ELSE?: NO

## 2025-03-18 SDOH — HEALTH STABILITY: MENTAL HEALTH: HOW MANY DRINKS CONTAINING ALCOHOL DO YOU HAVE ON A TYPICAL DAY WHEN YOU ARE DRINKING?: PATIENT DOES NOT DRINK

## 2025-03-18 SDOH — SOCIAL STABILITY: SOCIAL INSECURITY: HAS ANYONE EVER THREATENED TO HURT YOUR FAMILY OR YOUR PETS?: NO

## 2025-03-18 SDOH — SOCIAL STABILITY: SOCIAL INSECURITY: ARE THERE ANY APPARENT SIGNS OF INJURIES/BEHAVIORS THAT COULD BE RELATED TO ABUSE/NEGLECT?: NO

## 2025-03-18 SDOH — SOCIAL STABILITY: SOCIAL NETWORK: HOW OFTEN DO YOU ATTEND MEETINGS OF THE CLUBS OR ORGANIZATIONS YOU BELONG TO?: NEVER

## 2025-03-18 SDOH — SOCIAL STABILITY: SOCIAL NETWORK: HOW OFTEN DO YOU ATTEND CHURCH OR RELIGIOUS SERVICES?: 1 TO 4 TIMES PER YEAR

## 2025-03-18 SDOH — SOCIAL STABILITY: SOCIAL INSECURITY: DO YOU FEEL UNSAFE GOING BACK TO THE PLACE WHERE YOU ARE LIVING?: NO

## 2025-03-18 SDOH — SOCIAL STABILITY: SOCIAL NETWORK
IN A TYPICAL WEEK, HOW MANY TIMES DO YOU TALK ON THE PHONE WITH FAMILY, FRIENDS, OR NEIGHBORS?: MORE THAN THREE TIMES A WEEK

## 2025-03-18 ASSESSMENT — ENCOUNTER SYMPTOMS
DIFFICULTY URINATING: 0
NECK STIFFNESS: 0
APPETITE CHANGE: 0
CONSTIPATION: 0
NECK PAIN: 0
HYPERACTIVE: 0
BACK PAIN: 0
UNEXPECTED WEIGHT CHANGE: 0
WEAKNESS: 0
STRIDOR: 0
FACIAL SWELLING: 0
AGITATION: 0
MYALGIAS: 0
HEMATURIA: 0
SLEEP DISTURBANCE: 0
DECREASED CONCENTRATION: 0
SHORTNESS OF BREATH: 0
POLYDIPSIA: 0
NERVOUS/ANXIOUS: 0
HALLUCINATIONS: 0
RECTAL PAIN: 0
DYSPHORIC MOOD: 0
NAUSEA: 0
DIZZINESS: 0
CHEST TIGHTNESS: 0
SINUS PRESSURE: 0
ADENOPATHY: 0
EYE DISCHARGE: 0
WHEEZING: 0
VOMITING: 0
DIARRHEA: 0
EYE ITCHING: 0
ARTHRALGIAS: 0
COLOR CHANGE: 0
PHOTOPHOBIA: 0
LIGHT-HEADEDNESS: 1
ABDOMINAL PAIN: 0
CHILLS: 0
BLOOD IN STOOL: 0
ABDOMINAL DISTENTION: 0
DYSURIA: 0
POLYPHAGIA: 0
CONFUSION: 0
ACTIVITY CHANGE: 0
PALPITATIONS: 0
DIAPHORESIS: 0
SEIZURES: 0
ANAL BLEEDING: 0
WOUND: 0
JOINT SWELLING: 0
NUMBNESS: 0
TREMORS: 1
RHINORRHEA: 0
FATIGUE: 0
FACIAL ASYMMETRY: 0
VOICE CHANGE: 0
FREQUENCY: 0
FLANK PAIN: 0
APNEA: 0
EYE PAIN: 0
SORE THROAT: 0
CHOKING: 0
FEVER: 0
HEADACHES: 0
EYE REDNESS: 0
SPEECH DIFFICULTY: 0
COUGH: 0
SINUS PAIN: 0
BRUISES/BLEEDS EASILY: 0
TROUBLE SWALLOWING: 0

## 2025-03-18 ASSESSMENT — COGNITIVE AND FUNCTIONAL STATUS - GENERAL
PATIENT BASELINE BEDBOUND: NO
DAILY ACTIVITIY SCORE: 24
MOBILITY SCORE: 24

## 2025-03-18 ASSESSMENT — LIFESTYLE VARIABLES
EVER FELT BAD OR GUILTY ABOUT YOUR DRINKING: NO
AUDIT-C TOTAL SCORE: 0
HAVE PEOPLE ANNOYED YOU BY CRITICIZING YOUR DRINKING: NO
SKIP TO QUESTIONS 9-10: 1
HAVE YOU EVER FELT YOU SHOULD CUT DOWN ON YOUR DRINKING: NO
HOW OFTEN DO YOU HAVE A DRINK CONTAINING ALCOHOL: MONTHLY OR LESS
AUDIT-C TOTAL SCORE: 1
HOW OFTEN DO YOU HAVE 6 OR MORE DRINKS ON ONE OCCASION: NEVER
TOTAL SCORE: 0
HOW MANY STANDARD DRINKS CONTAINING ALCOHOL DO YOU HAVE ON A TYPICAL DAY: 1 OR 2
AUDIT-C TOTAL SCORE: 1
EVER HAD A DRINK FIRST THING IN THE MORNING TO STEADY YOUR NERVES TO GET RID OF A HANGOVER: NO
SKIP TO QUESTIONS 9-10: 1

## 2025-03-18 ASSESSMENT — ACTIVITIES OF DAILY LIVING (ADL)
BATHING: INDEPENDENT
HEARING - RIGHT EAR: FUNCTIONAL
DRESSING YOURSELF: INDEPENDENT
WALKS IN HOME: INDEPENDENT
FEEDING YOURSELF: INDEPENDENT
TOILETING: INDEPENDENT
ASSISTIVE_DEVICE: CANE;EYEGLASSES
JUDGMENT_ADEQUATE_SAFELY_COMPLETE_DAILY_ACTIVITIES: YES
HEARING - LEFT EAR: FUNCTIONAL
GROOMING: INDEPENDENT
LACK_OF_TRANSPORTATION: NO
PATIENT'S MEMORY ADEQUATE TO SAFELY COMPLETE DAILY ACTIVITIES?: YES
ADEQUATE_TO_COMPLETE_ADL: YES

## 2025-03-18 ASSESSMENT — PATIENT HEALTH QUESTIONNAIRE - PHQ9
1. LITTLE INTEREST OR PLEASURE IN DOING THINGS: NOT AT ALL
SUM OF ALL RESPONSES TO PHQ9 QUESTIONS 1 & 2: 0
2. FEELING DOWN, DEPRESSED OR HOPELESS: NOT AT ALL

## 2025-03-18 ASSESSMENT — PAIN SCALES - GENERAL
PAINLEVEL_OUTOF10: 4
PAINLEVEL_OUTOF10: 0 - NO PAIN

## 2025-03-18 ASSESSMENT — PAIN - FUNCTIONAL ASSESSMENT: PAIN_FUNCTIONAL_ASSESSMENT: 0-10

## 2025-03-18 NOTE — PROGRESS NOTES
Dilshad Freeman is a 64 y.o. male admitted for No Principal Problem currently listed. Pharmacy reviewed the patient's [id number] medications and allergies for accuracy.    Medications ADDED:  None  Medications CHANGED:  aspirin 81 mg chewable tablet  escitalopram (Lexapro) 20 mg tablet  Medications REMOVED:   rosuvastatin (Crestor) 40 mg tablet   solifenacin (Vesicare) 10 mg tablet (duplicate)    The list below reflects the updated PTA list. Comments regarding how patient may be taking medications differently can be found in the Admit Orders Activity  Prior to Admission Medications   Prescriptions Last Dose Informant   amiodarone (Pacerone) 200 mg tablet 3/18/2025 Morning Self   Sig: Take 1 tablet (200 mg) by mouth once daily.   apixaban (Eliquis) 5 mg tablet 3/18/2025 Morning Self   Sig: TAKE 1 TABLET BY MOUTH EVERY 12 HOURS   aspirin 81 mg chewable tablet 3/17/2025 Bedtime Self   Sig: Chew 1 tablet (81 mg) once daily at bedtime.   cloNIDine (Catapres) 0.1 mg tablet 3/18/2025 Morning Self   Sig: TAKE 1 TABLET (0.1 MG) BY MOUTH 3 TIMES A DAY.   escitalopram (Lexapro) 20 mg tablet 3/18/2025 Morning Self   Sig: Take 1 tablet (20 mg) by mouth once daily in the morning.   ezetimibe (Zetia) 10 mg tablet 3/17/2025 Bedtime Self   Sig: TAKE 1 TABLET BY MOUTH EVERY DAY   ferrous sulfate 324 mg (65 mg elemental iron) EC tablet (delayed release) 3/17/2025 Bedtime Self   Sig: Take 1 tablet (65 mg) by mouth once daily.   gabapentin (Neurontin) 400 mg capsule 3/18/2025 Morning Self   Sig: Take 1 capsule (400 mg) by mouth 3 times a day.   losartan (Cozaar) 50 mg tablet 3/17/2025   Noon Self   Sig: Take 1 tablet (50 mg) by mouth once daily.   metoprolol tartrate (Lopressor) 50 mg tablet 3/18/2025 Morning Self   Sig: TAKE 1 TABLET BY MOUTH TWICE A DAY   mirtazapine (Remeron) 45 mg tablet 3/16/2025 Bedtime    Sig: Take 1 tablet (45 mg) by mouth once daily at bedtime.   naltrexone (Depade) 50 mg tablet 3/18/2025 Morning Self    Sig: Take 1 tablet (50 mg) by mouth once daily.      solifenacin (Vesicare) 10 mg tablet 3/18/2025 Morning Self   Sig: Take 1 tablet (10 mg) by mouth once daily. Swallow   tablet whole; do not crush, chew, or split.      Facility-Administered Medications: None        The list below reflects the updated allergy list. Please review each documented allergy for additional clarification and justification.  Allergies  Reviewed by Randy Novak on 3/18/2025   No Known Allergies         Pharmacy has been updated to [facility].    Sources used to complete the med history include:   Patient interviewed with visitor at bedside, good historian, dispense report, care everywhere, chart review history    Below are additional concerns with the patient's PTA list.  None    Randy Novak, [place]  Please reach out via [employer] Secure Chat for questions

## 2025-03-18 NOTE — ED PROVIDER NOTES
Emergency Department Encounter  [facility] EMERGENCY MEDICINE    Patient: Dilshad Freeman  MRN: [id number]  : 1960  Date of Evaluation: 3/18/2025  ED Supervising Physician:  Jeff Coe DO    I personally evaluated Dilshad Freeman and made/approved the management plan and take responsibility for the patient management.    This will serve as my Supervisory note and shared attestation.  I did perform a substantive portion of the visit including all aspects of the Medical Decision Making.         CHIEF COMPLAINT       Chief Complaint   Patient presents with    Dizziness     Pt states that he has been feeling dizzy and been having jerking motions. Pt states that his legs gave out and he fell. Did not hit head.        In brief, Dilshad Freeman is a 64 y.o. that presents to the emergency department history of atrial fibrillation currently on Eliquis does have history of alcohol use.  Currently on amiodarone clonidine losartan metoprolol with metoprolol possibly contributing to bradycardia, as recently metoprolol increased to 100 mg twice daily 2025    Focused exam:   GEN: patient nontoxic appearing, no acute distress in room, well-nourished well-developed  HEENT: pupils equal and round bilaterally no nystagmus no gaze preference, face symmetric no   droop  HEART: heart rate regular, rhythm regular, no murmur, no peripheral pitting edema ,no   asymmetrical swelling of extremities, distal pulses equal and intact bilaterally  LUNGS: clear to auscultation bilaterally no rhonchi rales or wheezes  NEURO: no focal neurologic deficit, awake and alert and oriented ×3, Sensation grossly intact      Vitals:    25 1614 25 1619 25 1631 25 1644   BP: (!) 140/119 139/70 135/68 133/66   BP Location:       Patient Position:       Pulse: (!) 44  (!) 45 (!) 44   Resp: (!) 9  11 10   Temp:       SpO2:       Weight:       Height:            Labs Reviewed   CBC WITH AUTO  DIFFERENTIAL - Abnormal       Result Value    WBC 5.1      nRBC 0.0      RBC 3.91 (*)     Hemoglobin 12.0 (*)     Hematocrit 37.0 (*)     MCV 95      MCH 30.7      MCHC 32.4      RDW 13.2      Platelets 251      Neutrophils % 58.6      Immature Granulocytes %, Automated 0.4      Lymphocytes % 26.4      Monocytes % 9.1      Eosinophils % 4.9      Basophils % 0.6      Neutrophils Absolute 2.98      Immature Granulocytes Absolute, Automated 0.02      Lymphocytes Absolute 1.34      Monocytes Absolute 0.46      Eosinophils Absolute 0.25      Basophils Absolute 0.03     COMPREHENSIVE METABOLIC PANEL - Abnormal    Glucose 87      Sodium 134 (*)     Potassium 5.4 (*)     Chloride 103      Bicarbonate 28      Anion Gap 8 (*)     Urea Nitrogen 31 (*)     Creatinine 2.75 (*)     eGFR 25 (*)     Calcium 8.6      Albumin 3.7      Alkaline Phosphatase 70      Total Protein 6.5      AST 28      Bilirubin, Total 0.4      ALT 27     ACUTE TOXICOLOGY PANEL, BLOOD - Normal    Acetaminophen <10.0      Salicylate  <3      Alcohol <10     MAGNESIUM - Normal    Magnesium 2.28     TSH WITH REFLEX TO FREE T4 IF ABNORMAL - Normal    Thyroid Stimulating Hormone 1.77      Narrative:     TSH testing is performed using different testing methodology at [facility] than at other [facility]. Direct result comparisons should only be made within the same method.     CBC WITH AUTO DIFFERENTIAL   COMPREHENSIVE METABOLIC PANEL   URINALYSIS WITH REFLEX CULTURE AND MICROSCOPIC    Narrative:     The following orders were created for panel order Urinalysis with Reflex Culture and Microscopic.  Procedure                               Abnormality         Status                     ---------                               -----------         ------                     Urinalysis with Reflex C...[[id number]]                                                 Extra Urine Gray Tube[[id number]]                                                          Please view results for these tests on the individual orders.   MAGNESIUM   URINALYSIS WITH REFLEX CULTURE AND MICROSCOPIC   EXTRA URINE GRAY TUBE   DRUG SCREEN,URINE   ACUTE TOXICOLOGY PANEL, BLOOD   TSH WITH REFLEX TO FREE T4 IF ABNORMAL        Medications   chlordiazePOXIDE (Librium) capsule 25 mg (25 mg oral Not Given 3/18/25 1647)   sodium chloride 0.9 % bolus 1,000 mL (1,000 mL intravenous New Bag 3/18/25 1558)   atropine injection 1 mg (1 mg intravenous Given 3/18/25 1546)   magnesium sulfate 2 g in sterile water for injection 50 mL (2 g intravenous New Bag 3/18/25 1604)        ED Course as of 03/18/25 1724   Tue Mar 18, 2025   1519 EKG interpretation  Obtained 1505 reviewed 1515  Junctional bradycardia no acute ST elevation depression signs of acute ischemia rate 39  QTc 289 no bundle branch block nonspecific ST changes  Per my independent interpretation     [SL]   1526 You have slight concern for underlying alcohol withdrawal will give low-dose Librium [SL]   1554 Patient with no significant reaction atropine [SL]   1614 Hemoglobin normal limits no leukocytosis we will continue to monitor [SL]   1628 Patient appears to have a component of DIA, will continue to hydrate blood pressure improving [SL]   1722 Consult Dr. JENNIFER Alcantar spoke to the admitting medical service about the patient's clinical workup as well as the  agrees to admission to their service at this time. Accepting physician will continue the medical evaluation/workup and treatment plan upon admission and add additional testing, interventions/treatment as necessary [SL]      ED Course User Index  [SL] Jeff Coe, DO         Diagnoses as of 03/18/25 1724   Symptomatic bradycardia   DIA (acute kidney injury) (CMS-[facility])   Dehydration        Brief ED course/MDM:         All results/imaging obtained reviewed and interpreted, results trended/compared with previous levels if available to evaluate for abnormality contributing to today´s  presentation  After reviewing patient´s comorbidities, severity of history of presenting illness, labs andimaging if obtained in conjunction with physical exam and course in emergency department, deemed to have potential for deterioration/progression of symptoms that could lead to multiple morbidities or mortality, decision made that patient requiresfurther observation/evaluation/treatment and patient admitted to appropriate service,patient/family understand and agree with plan.     Chart created with voice recognition software, errors may be present due to software´sinterpretation    CRITICAL CARE NOTE  Due to the patient's symptoms on presentation. Need for frequent reassessment. Potential for deterioration. All orded diagnostic testing results obtained were reviewed and interpreted, results trended/compared with previous levels if available to evaluate for abnormality/interval change contributing to today´s presentation, symptomatic bradycardia, IV fluid bolus for acute kidney injury, attempted IV atropine for bradycardia discussion admitting physician   and the time required for documentation in ED EMR Critical care time total 35 minutes. This did not include any separate billable procedures          Diagnostics interpreted by me:  Per my independendant interpretation pre-brown read  XR chest no focal infiltrate or other acute process   defer to radiologist final report    CT head wo IV contrast   Final Result   No CT evidence of acute intracranial injury.                  MACRO:   None             Signed by: Ruel Avendano 3/18/2025 5:09 PM   Dictation workstation:   [id number]      CT cervical spine wo IV contrast   Final Result   No evidence for an acute fracture or subluxation of the cervical   spine.        MACRO:   None        Signed by: Ruel Avendano 3/18/2025 5:10 PM   Dictation workstation:   [id number]      XR chest 1 view   Final Result   No evidence of acute cardiopulmonary process.        Signed  by: Charla Delaney 3/18/2025 3:49 PM   Dictation workstation:   [id number]               1. Symptomatic bradycardia    2. DIA (acute kidney injury) (CMS-HCC)    3. Dehydration         DISPOSITION    Admit 03/18/2025 05:23:26 PM    PATIENT REFERRED TO:  No follow-up provider specified.    DISCHARGE MEDICATIONS:  New Prescriptions    No medications on file                 All diagnostic, treatment, and disposition decisions were made by myself in conjunction with the [facility]. For all further details of the patient's emergency department visit, please see their documentation.    (Comment: Please note this report has been produced using speech recognition software and may contain errors related to that system including errors in grammar, punctuation, and spelling, as well as words and phrases that may be inappropriate.  If there are any questions or concerns please feel free to contact the dictating provider for clarification.)    Jeff Coe, DO     [employer]     Jeff Coe,   03/18/25 1725

## 2025-03-19 ENCOUNTER — APPOINTMENT (OUTPATIENT)
Dept: CARDIOLOGY | Facility: HOSPITAL | Age: 65
DRG: 309 | End: 2025-03-19
Payer: MEDICARE

## 2025-03-19 LAB
ALBUMIN SERPL BCP-MCNC: 3.2 G/DL (ref 3.4–5)
ALBUMIN SERPL BCP-MCNC: 3.7 G/DL (ref 3.4–5)
ALP SERPL-CCNC: 72 U/L (ref 33–136)
ALT SERPL W P-5'-P-CCNC: 32 U/L (ref 10–52)
ANION GAP SERPL CALCULATED.3IONS-SCNC: 9 MMOL/L (ref 10–20)
ANION GAP SERPL CALCULATED.3IONS-SCNC: 9 MMOL/L (ref 10–20)
AST SERPL W P-5'-P-CCNC: 36 U/L (ref 9–39)
BILIRUB SERPL-MCNC: 0.4 MG/DL (ref 0–1.2)
BUN SERPL-MCNC: 27 MG/DL (ref 6–23)
BUN SERPL-MCNC: 27 MG/DL (ref 6–23)
CALCIUM SERPL-MCNC: 8.1 MG/DL (ref 8.6–10.3)
CALCIUM SERPL-MCNC: 8.9 MG/DL (ref 8.6–10.3)
CHLORIDE SERPL-SCNC: 107 MMOL/L (ref 98–107)
CHLORIDE SERPL-SCNC: 110 MMOL/L (ref 98–107)
CO2 SERPL-SCNC: 25 MMOL/L (ref 21–32)
CO2 SERPL-SCNC: 27 MMOL/L (ref 21–32)
CREAT SERPL-MCNC: 2.03 MG/DL (ref 0.5–1.3)
CREAT SERPL-MCNC: 2.12 MG/DL (ref 0.5–1.3)
EGFRCR SERPLBLD CKD-EPI 2021: 34 ML/MIN/1.73M*2
EGFRCR SERPLBLD CKD-EPI 2021: 35 ML/MIN/1.73M*2
ERYTHROCYTE [DISTWIDTH] IN BLOOD BY AUTOMATED COUNT: 13.2 % (ref 11.5–14.5)
ERYTHROCYTE [DISTWIDTH] IN BLOOD BY AUTOMATED COUNT: 13.3 % (ref 11.5–14.5)
GLUCOSE SERPL-MCNC: 78 MG/DL (ref 74–99)
GLUCOSE SERPL-MCNC: 94 MG/DL (ref 74–99)
HCT VFR BLD AUTO: 36 % (ref 41–52)
HCT VFR BLD AUTO: 41.4 % (ref 41–52)
HGB BLD-MCNC: 11.8 G/DL (ref 13.5–17.5)
HGB BLD-MCNC: 13.1 G/DL (ref 13.5–17.5)
HOLD SPECIMEN: NORMAL
MAGNESIUM SERPL-MCNC: 2.22 MG/DL (ref 1.6–2.4)
MCH RBC QN AUTO: 30.4 PG (ref 26–34)
MCH RBC QN AUTO: 31.1 PG (ref 26–34)
MCHC RBC AUTO-ENTMCNC: 31.6 G/DL (ref 32–36)
MCHC RBC AUTO-ENTMCNC: 32.8 G/DL (ref 32–36)
MCV RBC AUTO: 95 FL (ref 80–100)
MCV RBC AUTO: 96 FL (ref 80–100)
NRBC BLD-RTO: 0 /100 WBCS (ref 0–0)
NRBC BLD-RTO: 0 /100 WBCS (ref 0–0)
PHOSPHATE SERPL-MCNC: 2.8 MG/DL (ref 2.5–4.9)
PLATELET # BLD AUTO: 223 X10*3/UL (ref 150–450)
PLATELET # BLD AUTO: 248 X10*3/UL (ref 150–450)
POTASSIUM SERPL-SCNC: 4.6 MMOL/L (ref 3.5–5.3)
POTASSIUM SERPL-SCNC: 4.9 MMOL/L (ref 3.5–5.3)
PROT SERPL-MCNC: 6.6 G/DL (ref 6.4–8.2)
RBC # BLD AUTO: 3.79 X10*6/UL (ref 4.5–5.9)
RBC # BLD AUTO: 4.31 X10*6/UL (ref 4.5–5.9)
SODIUM SERPL-SCNC: 138 MMOL/L (ref 136–145)
SODIUM SERPL-SCNC: 139 MMOL/L (ref 136–145)
WBC # BLD AUTO: 5 X10*3/UL (ref 4.4–11.3)
WBC # BLD AUTO: 6.2 X10*3/UL (ref 4.4–11.3)

## 2025-03-19 PROCEDURE — 83735 ASSAY OF MAGNESIUM: CPT

## 2025-03-19 PROCEDURE — 85027 COMPLETE CBC AUTOMATED: CPT | Performed by: INTERNAL MEDICINE

## 2025-03-19 PROCEDURE — 99232 SBSQ HOSP IP/OBS MODERATE 35: CPT | Performed by: INTERNAL MEDICINE

## 2025-03-19 PROCEDURE — 2500000001 HC RX 250 WO HCPCS SELF ADMINISTERED DRUGS (ALT 637 FOR MEDICARE OP)

## 2025-03-19 PROCEDURE — 99223 1ST HOSP IP/OBS HIGH 75: CPT | Performed by: PHYSICIAN ASSISTANT

## 2025-03-19 PROCEDURE — 84075 ASSAY ALKALINE PHOSPHATASE: CPT | Performed by: INTERNAL MEDICINE

## 2025-03-19 PROCEDURE — 2500000002 HC RX 250 W HCPCS SELF ADMINISTERED DRUGS (ALT 637 FOR MEDICARE OP, ALT 636 FOR OP/ED): Performed by: INTERNAL MEDICINE

## 2025-03-19 PROCEDURE — 36415 COLL VENOUS BLD VENIPUNCTURE: CPT | Performed by: INTERNAL MEDICINE

## 2025-03-19 PROCEDURE — 2020000001 HC ICU ROOM DAILY

## 2025-03-19 PROCEDURE — 93010 ELECTROCARDIOGRAM REPORT: CPT | Performed by: INTERNAL MEDICINE

## 2025-03-19 PROCEDURE — 2500000004 HC RX 250 GENERAL PHARMACY W/ HCPCS (ALT 636 FOR OP/ED): Performed by: PHYSICIAN ASSISTANT

## 2025-03-19 PROCEDURE — 93005 ELECTROCARDIOGRAM TRACING: CPT

## 2025-03-19 PROCEDURE — 80069 RENAL FUNCTION PANEL: CPT | Mod: CCI

## 2025-03-19 PROCEDURE — 85027 COMPLETE CBC AUTOMATED: CPT

## 2025-03-19 PROCEDURE — 2500000001 HC RX 250 WO HCPCS SELF ADMINISTERED DRUGS (ALT 637 FOR MEDICARE OP): Performed by: INTERNAL MEDICINE

## 2025-03-19 PROCEDURE — 99291 CRITICAL CARE FIRST HOUR: CPT

## 2025-03-19 PROCEDURE — 2500000004 HC RX 250 GENERAL PHARMACY W/ HCPCS (ALT 636 FOR OP/ED)

## 2025-03-19 PROCEDURE — 36415 COLL VENOUS BLD VENIPUNCTURE: CPT

## 2025-03-19 PROCEDURE — 80053 COMPREHEN METABOLIC PANEL: CPT | Performed by: INTERNAL MEDICINE

## 2025-03-19 PROCEDURE — 2500000001 HC RX 250 WO HCPCS SELF ADMINISTERED DRUGS (ALT 637 FOR MEDICARE OP): Performed by: PHYSICIAN ASSISTANT

## 2025-03-19 PROCEDURE — 2500000004 HC RX 250 GENERAL PHARMACY W/ HCPCS (ALT 636 FOR OP/ED): Performed by: INTERNAL MEDICINE

## 2025-03-19 RX ORDER — ROSUVASTATIN CALCIUM 20 MG/1
40 TABLET, COATED ORAL DAILY
Status: DISCONTINUED | OUTPATIENT
Start: 2025-03-20 | End: 2025-03-24 | Stop reason: HOSPADM

## 2025-03-19 RX ORDER — AMLODIPINE BESYLATE 5 MG/1
5 TABLET ORAL DAILY
Status: DISCONTINUED | OUTPATIENT
Start: 2025-03-19 | End: 2025-03-19

## 2025-03-19 RX ORDER — NALTREXONE HYDROCHLORIDE 50 MG/1
50 TABLET, FILM COATED ORAL DAILY
Status: DISCONTINUED | OUTPATIENT
Start: 2025-03-20 | End: 2025-03-20

## 2025-03-19 RX ORDER — DOPAMINE HYDROCHLORIDE 160 MG/100ML
0-5 INJECTION, SOLUTION INTRAVENOUS CONTINUOUS
Status: DISCONTINUED | OUTPATIENT
Start: 2025-03-19 | End: 2025-03-19

## 2025-03-19 RX ADMIN — ASPIRIN 81 MG: 81 TABLET, CHEWABLE ORAL at 21:09

## 2025-03-19 RX ADMIN — FAMOTIDINE 20 MG: 20 TABLET, FILM COATED ORAL at 08:09

## 2025-03-19 RX ADMIN — AMIODARONE HYDROCHLORIDE 200 MG: 200 TABLET ORAL at 08:09

## 2025-03-19 RX ADMIN — APIXABAN 5 MG: 5 TABLET, FILM COATED ORAL at 21:09

## 2025-03-19 RX ADMIN — EZETIMIBE 10 MG: 10 TABLET ORAL at 08:09

## 2025-03-19 RX ADMIN — DOCUSATE SODIUM 100 MG: 100 CAPSULE, LIQUID FILLED ORAL at 08:09

## 2025-03-19 RX ADMIN — APIXABAN 5 MG: 5 TABLET, FILM COATED ORAL at 09:42

## 2025-03-19 RX ADMIN — SODIUM CHLORIDE 500 ML: 900 INJECTION, SOLUTION INTRAVENOUS at 15:42

## 2025-03-19 RX ADMIN — MIRTAZAPINE 45 MG: 15 TABLET, FILM COATED ORAL at 21:09

## 2025-03-19 RX ADMIN — AMLODIPINE BESYLATE 5 MG: 5 TABLET ORAL at 08:55

## 2025-03-19 RX ADMIN — CLONIDINE HYDROCHLORIDE 0.1 MG: 0.1 TABLET ORAL at 08:09

## 2025-03-19 RX ADMIN — DEXTROSE MONOHYDRATE AND SODIUM CHLORIDE 100 ML/HR: 5; .45 INJECTION, SOLUTION INTRAVENOUS at 09:42

## 2025-03-19 RX ADMIN — DOCUSATE SODIUM 100 MG: 100 CAPSULE, LIQUID FILLED ORAL at 21:09

## 2025-03-19 RX ADMIN — FERROUS SULFATE TAB 325 MG (65 MG ELEMENTAL FE) 325 MG: 325 (65 FE) TAB at 08:09

## 2025-03-19 RX ADMIN — ESCITALOPRAM OXALATE 20 MG: 20 TABLET ORAL at 08:09

## 2025-03-19 SDOH — ECONOMIC STABILITY: TRANSPORTATION INSECURITY: IN THE PAST 12 MONTHS, HAS LACK OF TRANSPORTATION KEPT YOU FROM MEDICAL APPOINTMENTS OR FROM GETTING MEDICATIONS?: NO

## 2025-03-19 SDOH — HEALTH STABILITY: PHYSICAL HEALTH: ON AVERAGE, HOW MANY MINUTES DO YOU ENGAGE IN EXERCISE AT THIS LEVEL?: 10 MIN

## 2025-03-19 SDOH — ECONOMIC STABILITY: FOOD INSECURITY: WITHIN THE PAST 12 MONTHS, THE FOOD YOU BOUGHT JUST DIDN'T LAST AND YOU DIDN'T HAVE MONEY TO GET MORE.: NEVER TRUE

## 2025-03-19 SDOH — SOCIAL STABILITY: SOCIAL INSECURITY: ARE YOU MARRIED, WIDOWED, DIVORCED, SEPARATED, NEVER MARRIED, OR LIVING WITH A PARTNER?: DIVORCED

## 2025-03-19 SDOH — SOCIAL STABILITY: SOCIAL INSECURITY: WITHIN THE LAST YEAR, HAVE YOU BEEN AFRAID OF YOUR PARTNER OR EX-PARTNER?: NO

## 2025-03-19 SDOH — HEALTH STABILITY: MENTAL HEALTH: HOW OFTEN DO YOU HAVE SIX OR MORE DRINKS ON ONE OCCASION?: NEVER

## 2025-03-19 SDOH — ECONOMIC STABILITY: FOOD INSECURITY: WITHIN THE PAST 12 MONTHS, YOU WORRIED THAT YOUR FOOD WOULD RUN OUT BEFORE YOU GOT THE MONEY TO BUY MORE.: NEVER TRUE

## 2025-03-19 SDOH — SOCIAL STABILITY: SOCIAL INSECURITY: WITHIN THE LAST YEAR, HAVE YOU BEEN HUMILIATED OR EMOTIONALLY ABUSED IN OTHER WAYS BY YOUR PARTNER OR EX-PARTNER?: NO

## 2025-03-19 SDOH — SOCIAL STABILITY: SOCIAL NETWORK: HOW OFTEN DO YOU ATTEND CHURCH OR RELIGIOUS SERVICES?: 1 TO 4 TIMES PER YEAR

## 2025-03-19 SDOH — ECONOMIC STABILITY: FOOD INSECURITY: HOW HARD IS IT FOR YOU TO PAY FOR THE VERY BASICS LIKE FOOD, HOUSING, MEDICAL CARE, AND HEATING?: NOT VERY HARD

## 2025-03-19 SDOH — ECONOMIC STABILITY: INCOME INSECURITY: IN THE PAST 12 MONTHS HAS THE ELECTRIC, GAS, OIL, OR WATER COMPANY THREATENED TO SHUT OFF SERVICES IN YOUR HOME?: NO

## 2025-03-19 SDOH — HEALTH STABILITY: MENTAL HEALTH: HOW OFTEN DO YOU HAVE A DRINK CONTAINING ALCOHOL?: NEVER

## 2025-03-19 SDOH — ECONOMIC STABILITY: HOUSING INSECURITY: IN THE PAST 12 MONTHS, HOW MANY TIMES HAVE YOU MOVED WHERE YOU WERE LIVING?: 0

## 2025-03-19 SDOH — ECONOMIC STABILITY: HOUSING INSECURITY: AT ANY TIME IN THE PAST 12 MONTHS, WERE YOU HOMELESS OR LIVING IN A SHELTER (INCLUDING NOW)?: NO

## 2025-03-19 SDOH — SOCIAL STABILITY: SOCIAL NETWORK: HOW OFTEN DO YOU GET TOGETHER WITH FRIENDS OR RELATIVES?: ONCE A WEEK

## 2025-03-19 SDOH — ECONOMIC STABILITY: HOUSING INSECURITY: IN THE LAST 12 MONTHS, WAS THERE A TIME WHEN YOU WERE NOT ABLE TO PAY THE MORTGAGE OR RENT ON TIME?: NO

## 2025-03-19 SDOH — SOCIAL STABILITY: SOCIAL NETWORK: HOW OFTEN DO YOU ATTEND MEETINGS OF THE CLUBS OR ORGANIZATIONS YOU BELONG TO?: NEVER

## 2025-03-19 SDOH — HEALTH STABILITY: PHYSICAL HEALTH: ON AVERAGE, HOW MANY DAYS PER WEEK DO YOU ENGAGE IN MODERATE TO STRENUOUS EXERCISE (LIKE A BRISK WALK)?: 3 DAYS

## 2025-03-19 SDOH — HEALTH STABILITY: MENTAL HEALTH: HOW MANY DRINKS CONTAINING ALCOHOL DO YOU HAVE ON A TYPICAL DAY WHEN YOU ARE DRINKING?: PATIENT DOES NOT DRINK

## 2025-03-19 ASSESSMENT — PAIN SCALES - GENERAL
PAINLEVEL_OUTOF10: 0 - NO PAIN

## 2025-03-19 ASSESSMENT — PAIN - FUNCTIONAL ASSESSMENT
PAIN_FUNCTIONAL_ASSESSMENT: 0-10

## 2025-03-19 ASSESSMENT — COGNITIVE AND FUNCTIONAL STATUS - GENERAL
DAILY ACTIVITIY SCORE: 21
TOILETING: A LITTLE
HELP NEEDED FOR BATHING: A LITTLE
MOVING TO AND FROM BED TO CHAIR: A LITTLE
PERSONAL GROOMING: A LITTLE
CLIMB 3 TO 5 STEPS WITH RAILING: A LOT
STANDING UP FROM CHAIR USING ARMS: A LITTLE
MOBILITY SCORE: 19
WALKING IN HOSPITAL ROOM: A LITTLE

## 2025-03-19 ASSESSMENT — LIFESTYLE VARIABLES
SKIP TO QUESTIONS 9-10: 1
AUDIT-C TOTAL SCORE: 0

## 2025-03-19 ASSESSMENT — ACTIVITIES OF DAILY LIVING (ADL)
LACK_OF_TRANSPORTATION: NO
LACK_OF_TRANSPORTATION: NO

## 2025-03-19 NOTE — NURSING NOTE
Pt arrived to floor via bed and transferred to new bed. Pt oriented to room and family brought back to bedside. Dopamine on hold due to BP not in dopamine range

## 2025-03-19 NOTE — CARE PLAN
The patient's goals for the shift include rest    The clinical goals for the shift include effective heart rate/rhythm

## 2025-03-19 NOTE — PROGRESS NOTES
Dilshad Freeman is a 64 y.o. male on day 1 of admission presenting with Symptomatic bradycardia.    Subjective   Patient is doing fine.  Denies dizziness       Objective     Physical Exam  Vitals reviewed.   Constitutional:       Appearance: Normal appearance. He is obese.   HENT:      Head: Normocephalic and atraumatic.      Right Ear: Tympanic membrane, ear canal and external ear normal.      Left Ear: Tympanic membrane, ear canal and external ear normal.      Nose: Nose normal.      Mouth/Throat:      Pharynx: Oropharynx is clear.   Eyes:      Extraocular Movements: Extraocular movements intact.      Conjunctiva/sclera: Conjunctivae normal.      Pupils: Pupils are equal, round, and reactive to light.   Cardiovascular:      Rate and Rhythm: Normal rate and regular rhythm.      Pulses: Normal pulses.      Heart sounds: Normal heart sounds.   Pulmonary:      Effort: Pulmonary effort is normal.      Breath sounds: Normal breath sounds.   Abdominal:      General: Abdomen is flat. Bowel sounds are normal.      Palpations: Abdomen is soft.   Musculoskeletal:      Cervical back: Normal range of motion and neck supple.   Skin:     General: Skin is warm and dry.   Neurological:      General: No focal deficit present.      Mental Status: He is alert and oriented to person, place, and time.   Psychiatric:         Mood and Affect: Mood normal.           Intake/Output last 3 Shifts:  I/O last 3 completed shifts:  In: 805 (6.9 mL/kg) [I.V.:805 (6.9 mL/kg)]  Out: - (0 mL/kg)   Weight: 116.4 kg     Relevant Results               Scheduled medications  [Held by provider] amiodarone, 200 mg, oral, Daily  apixaban, 5 mg, oral, q12h  aspirin, 81 mg, oral, Nightly  chlordiazePOXIDE, 25 mg, oral, Once  [Held by provider] cloNIDine, 0.1 mg, oral, TID  docusate sodium, 100 mg, oral, BID  escitalopram, 20 mg, oral, Daily  ezetimibe, 10 mg, oral, Daily  famotidine, 20 mg, oral, Daily   Or  famotidine, 20 mg, intravenous, Daily  ferrous  sulfate, 1 tablet, oral, Daily  mirtazapine, 45 mg, oral, Nightly  polyethylene glycol, 17 g, oral, Daily  sodium chloride, 500 mL, intravenous, Once      Continuous medications  dextrose 5%-0.45 % sodium chloride, 100 mL/hr, Last Rate: 100 mL/hr (03/19/25 0942)      PRN medications  PRN medications: acetaminophen **OR** acetaminophen **OR** acetaminophen, benzocaine-menthol, dextromethorphan-guaifenesin, guaiFENesin, melatonin, ondansetron **OR** ondansetron  Results for orders placed or performed during the hospital encounter of 03/18/25 (from the past 24 hours)   Urinalysis with Reflex Culture and Microscopic   Result Value Ref Range    Color, Urine Light-Yellow Light-Yellow, Yellow, Dark-Yellow    Appearance, Urine Clear Clear    Specific Gravity, Urine 1.012 1.005 - 1.035    pH, Urine 5.5 5.0, 5.5, 6.0, 6.5, 7.0, 7.5, 8.0    Protein, Urine NEGATIVE NEGATIVE, 10 (TRACE), 20 (TRACE) mg/dL    Glucose, Urine Normal Normal mg/dL    Blood, Urine NEGATIVE NEGATIVE mg/dL    Ketones, Urine NEGATIVE NEGATIVE mg/dL    Bilirubin, Urine NEGATIVE NEGATIVE mg/dL    Urobilinogen, Urine Normal Normal mg/dL    Nitrite, Urine NEGATIVE NEGATIVE    Leukocyte Esterase, Urine NEGATIVE NEGATIVE   Extra Urine Gray Tube   Result Value Ref Range    Extra Tube Hold for add-ons.    Drug Screen, Urine   Result Value Ref Range    Amphetamine Screen, Urine Presumptive Negative Presumptive Negative    Barbiturate Screen, Urine Presumptive Negative Presumptive Negative    Benzodiazepines Screen, Urine Presumptive Negative Presumptive Negative    Cannabinoid Screen, Urine Presumptive Negative Presumptive Negative    Cocaine Metabolite Screen, Urine Presumptive Negative Presumptive Negative    Fentanyl Screen, Urine Presumptive Negative Presumptive Negative    Opiate Screen, Urine Presumptive Negative Presumptive Negative    Oxycodone Screen, Urine Presumptive Negative Presumptive Negative    PCP Screen, Urine Presumptive Negative Presumptive  Negative    Methadone Screen, Urine Presumptive Negative Presumptive Negative   CBC   Result Value Ref Range    WBC 5.0 4.4 - 11.3 x10*3/uL    nRBC 0.0 0.0 - 0.0 /100 WBCs    RBC 4.31 (L) 4.50 - 5.90 x10*6/uL    Hemoglobin 13.1 (L) 13.5 - 17.5 g/dL    Hematocrit 41.4 41.0 - 52.0 %    MCV 96 80 - 100 fL    MCH 30.4 26.0 - 34.0 pg    MCHC 31.6 (L) 32.0 - 36.0 g/dL    RDW 13.2 11.5 - 14.5 %    Platelets 248 150 - 450 x10*3/uL   Comprehensive metabolic panel   Result Value Ref Range    Glucose 94 74 - 99 mg/dL    Sodium 138 136 - 145 mmol/L    Potassium 4.6 3.5 - 5.3 mmol/L    Chloride 107 98 - 107 mmol/L    Bicarbonate 27 21 - 32 mmol/L    Anion Gap 9 (L) 10 - 20 mmol/L    Urea Nitrogen 27 (H) 6 - 23 mg/dL    Creatinine 2.03 (H) 0.50 - 1.30 mg/dL    eGFR 35 (L) >60 mL/min/1.73m*2    Calcium 8.9 8.6 - 10.3 mg/dL    Albumin 3.7 3.4 - 5.0 g/dL    Alkaline Phosphatase 72 33 - 136 U/L    Total Protein 6.6 6.4 - 8.2 g/dL    AST 36 9 - 39 U/L    Bilirubin, Total 0.4 0.0 - 1.2 mg/dL    ALT 32 10 - 52 U/L     *Note: Due to a large number of results and/or encounters for the requested time period, some results have not been displayed. A complete set of results can be found in Results Review.     Electrocardiogram, 12-lead PRN ACS symptoms    Result Date: 3/19/2025  Junctional bradycardia ST & T wave abnormality, consider anterior ischemia Abnormal ECG When compared with ECG of 06-DEC-2024 17:36, Junctional rhythm has replaced Sinus rhythm Vent. rate has decreased BY  31 BPM Non-specific change in ST segment in Anterior leads Nonspecific T wave abnormality now evident in Inferior leads T wave inversion now evident in Anterior leads QT has shortened    US renal complete    Result Date: 3/19/2025  Interpreted By:  Madhu, Rachael, STUDY: US RENAL COMPLETE; 3/18/2025 11:15 pm   INDICATION: Acute renal insufficiency   COMPARISON: None.   ACCESSION NUMBER(S): [id number]   ORDERING CLINICIAN: TEJA WAITE   TECHNIQUE: Grayscale and color  Doppler imaging of the kidneys.   FINDINGS: The right kidney measures 10.5 cm in length. The left kidney measures 10.8 cm in length. There is no shadowing calculus, hydronephrosis, or solid mass identified. The renal cortical thickness and echogenicity is normal.   Urinary bladder is well distended without mass, wall thickening, or calculus. Bilateral ureteral jets are seen.       Normal ultrasound of the kidneys.   MACRO: None.   Signed by: Rachael Leung 3/19/2025 8:09 AM Dictation workstation:   [id number]    CT cervical spine wo IV contrast    Result Date: 3/18/2025  Interpreted By:  Ruel Avendano, STUDY: CT CERVICAL SPINE WO IV CONTRAST;  3/18/2025 4:58 pm   INDICATION: Signs/Symptoms:fall.     COMPARISON: None.   ACCESSION NUMBER(S): [id number]   ORDERING CLINICIAN: ERNESTINE WEST   TECHNIQUE: Axial CT images of the cervical spine are obtained. Axial, coronal and sagittal reconstructions are provided for review.   FINDINGS:     Fractures: There is no evidence for an acute fracture of the cervical spine.   Vertebral Alignment: No posttraumatic malalignment. There is overall straightening of the normal cervical lordosis, presumed secondary to patient positioning or spasm.   Craniocervical Junction: The odontoid process and craniocervical junction are intact.   Vertebrae/Disc Spaces:  Multilevel spondylosis. Multilevel disc space narrowing. Multilevel facet arthropathy Multilevel uncovertebral hypertrophy.Multilevel osteophyte formation.   Prevertebral/Paraspinal Soft Tissues: The prevertebral and paraspinal soft tissues are unremarkable.         No evidence for an acute fracture or subluxation of the cervical spine.   MACRO: None   Signed by: Ruel Avendano 3/18/2025 5:10 PM Dictation workstation:   [id number]    CT head wo IV contrast    Result Date: 3/18/2025  Interpreted By:  Ruel Avendano, STUDY: CT HEAD WO IV CONTRAST;  3/18/2025 4:58 pm   INDICATION: Signs/Symptoms:fall from standing height.   COMPARISON:  None.   ACCESSION NUMBER(S): [id number]   ORDERING CLINICIAN: ERNESTINE WEST   TECHNIQUE: Noncontrast axial CT scan of head was performed. Angled reformats in brain and bone windows were generated. The images were reviewed in bone, brain, blood and soft tissue windows.   FINDINGS: CSF Spaces: The ventricles, sulci and basal cisterns are within normal limits. There is no extraaxial fluid collection.   Parenchyma: Periventricular and subcortical white matter hypoattenuation is nonspecific, however likely reflects chronic microvascular ischemic versus chronic hypertensive changes. The grey-white differentiation is intact. There is no mass effect or midline shift.  There is no intracranial hemorrhage.   Calvarium: The calvarium is unremarkable.   Paranasal sinuses and mastoids: Mild mucosal thickening of the right maxillary sinus. Mastoid air cells appear clear.       No CT evidence of acute intracranial injury.       MACRO: None     Signed by: Ruel Avendano 3/18/2025 5:09 PM Dictation workstation:   [id number]    XR chest 1 view    Result Date: 3/18/2025  Interpreted By:  Charla Delaney, STUDY: XR CHEST 1 VIEW;  3/18/2025 3:32 pm   INDICATION: Signs/Symptoms:weakness.   COMPARISON: CT abdomen pelvis 02/02/2024 Chest radiograph 08/13/2020   ACCESSION NUMBER(S): [id number]   ORDERING CLINICIAN: ERNESTINE WEST   FINDINGS: AP radiograph of the chest was provided.       CARDIOMEDIASTINAL SILHOUETTE: Cardiomediastinal silhouette is normal in size and configuration.   LUNGS: No consolidation, pulmonary edema, pleural effusion, or pneumothorax.   ABDOMEN: No remarkable upper abdominal findings.   BONES: No acute osseous changes.       No evidence of acute cardiopulmonary process.   Signed by: Charla Delaney 3/18/2025 3:49 PM Dictation workstation:   [id number]                  Assessment/Plan   Assessment & Plan  Symptomatic bradycardia    Fall    Coarse tremors    Anxiety    Carcinoma of prostate (Multi)    CAD (coronary  artery disease)    Diabetes mellitus without complication (Multi)    Dyslipidemia    Hypercholesterolemia    Morbid obesity (Multi)    Paroxysmal atrial fibrillation (Multi)    Lumbar postlaminectomy syndrome    Obstructive sleep apnea, adult    Seizure disorder (Multi)    Bariatric surgery status    DIA (acute kidney injury) (CMS-HCC)    Acute kidney injury improving  Ultrasound kidney normal  Bradycardia stable  Patient's blood pressure is higher  Cardiology is managing the blood pressure medications         I spent  minutes in the professional and overall care of this patient.      Debra Alcantar MD

## 2025-03-19 NOTE — NURSING NOTE
Pt admitted to room. Oriented to room,call light,BSSR, hourly rounding,POC for this shift. No concerns at this time. Pt requested snack since he had no dinner. Box lunch provided w/water, juices. Urinal and call light in reach. Pt stated that he is not always compliant with use of CPAP. bPt advised/educated of the importance of consistent use of CPAP to prevent worsening cardiac conditions. Advised Pt that if he refuses CPA, O2@ 2L would have to be in place overnight w/HOB elevated. Pt stated that he will let this RN know what he would like to do before he sleeps.

## 2025-03-19 NOTE — PROGRESS NOTES
LP[facility] met with pt at bedside. Pts PCP is Dr SAPPHIRE Alcantar. Discussion around dc planning needs with pt not thinking he will have any needs at dc however also await PT/OT aida.      03/19/25 6657   Discharge Planning   Living Arrangements Alone   Support Systems Family members;Friends/neighbors   Type of Residence Private residence   Who is requesting discharge planning? Provider   Home or Post Acute Services None   Expected Discharge Disposition Home   Financial Resource Strain   How hard is it for you to pay for the very basics like food, housing, medical care, and heating? Not very   Housing Stability   In the last 12 months, was there a time when you were not able to pay the mortgage or rent on time? N   In the past 12 months, how many times have you moved where you were living? 0   At any time in the past 12 months, were you homeless or living in a shelter (including now)? N   Transportation Needs   In the past 12 months, has lack of transportation kept you from medical appointments or from getting medications? no   In the past 12 months, has lack of transportation kept you from meetings, work, or from getting things needed for daily living? No

## 2025-03-19 NOTE — CONSULTS
Inpatient consult to Cardiology  Consult performed by: Sarah Mullen PA-C  Consult ordered by: Debra Alcantar MD  Reason for consult: Bradycardia        History Of Present Illness:    Dilshad Freeman is a 64 y.o. male presenting with past medical history of chronic renal failure stage III, hypertension, coronary artery disease, diastolic heart failure, hyperlipidemia, paroxysmal atrial fibrillation on Eliquis, alcohol abuse, bilateral hip replacement, bradycardia, prostate cancer s/p radiation treatment, gastric sleeve presenting to the ER after a fall, syncopal episode, and 1.5 weeks of tremors.  Patient states he was standing in front of his fridge for about an hour, and suddenly passed out.  Patient denies prior dizziness, lightheadedness, palpitations, chest pain, shortness of breath, or feelings of fast heart rate.  Upon arrival to the ER patient was found to be mildly hypotensive 95/55, heart rate 40, oxygen saturation on room air 98%, labs show sodium 134, potassium 5.4, BUN 31, creatinine 2.75, GFR 25, hemoglobin 12.0, hematocrit 37.0.  Head CT showed no acute intracranial injury, cervical CT showed no evidence of acute fracture or subluxation, chest x-ray negative for any acute cardiopulmonary process.  EKG showed bradycardia with a ventricular rate of 39, and ST changes in anterior lead, V2.   Patient reports having intermittent tremors in the past, however they usually only lasted a few days at a time.  Patient has a history of alcohol abuse, he drinks at least half a gallon of Eboni each day. Patient states he takes metoprolol 50 mg twice daily, he used to take metoprolol only once a day, unsure when the dosage was increased to twice a day. Coronary calcium score 370, 4/2022.  Patient's last echocardiogram 4/2023, showed a systolic ejection fraction of 60-65%, and impaired relaxation pattern of left ventricle during diastolic filling. Per patient he has seen Dr. Goodman in the past per cardiology.   "Cardiology was consulted due to bradycardia     Last Recorded Vitals:  Vitals:    03/18/25 2101 03/19/25 0100 03/19/25 0500 03/19/25 0736   BP: 142/86 109/59 132/75 160/90   BP Location: Left arm Left arm Left arm Left arm   Patient Position: Lying Lying Lying Lying   Pulse: (!) 45   (!) 44   Resp: 14 14 14 14   Temp: 36.4 °C (97.6 °F) 36.5 °C (97.7 °F) 36.4 °C (97.5 °F)    TempSrc: Oral Oral Oral Oral   SpO2: 100% 100% 100% 97%   Weight: 111 kg (245 lb 9.5 oz)  116 kg (256 lb 9.9 oz)    Height: 1.727 m (5' 8\")          Last Labs:  CBC - 3/19/2025:  4:59 AM  5.0 13.1 248    41.4      CMP - 3/19/2025:  4:59 AM  8.9 6.6 36 --- 0.4   _ 3.7 32 72      PTT - No results in last year.  _   _ _     Hemoglobin A1C   Date/Time Value Ref Range Status   09/28/2022 11:10 AM 5.4 % Final     Comment:          Diagnosis of Diabetes-Adults   Non-Diabetic: < or = 5.6%   Increased risk for developing diabetes: 5.7-6.4%   Diagnostic of diabetes: > or = 6.5%  .       Monitoring of Diabetes                Age (y)     Therapeutic Goal (%)   Adults:          >18           <7.0   Pediatrics:    13-18           <7.5                   7-12           <8.0                   0- 6            7.5-8.5   American Diabetes Association. Diabetes Care 33(S1), Jan 2010.     06/24/2022 10:35 AM 5.2 % Final     Comment:          Diagnosis of Diabetes-Adults   Non-Diabetic: < or = 5.6%   Increased risk for developing diabetes: 5.7-6.4%   Diagnostic of diabetes: > or = 6.5%  .       Monitoring of Diabetes                Age (y)     Therapeutic Goal (%)   Adults:          >18           <7.0   Pediatrics:    13-18           <7.5                   7-12           <8.0                   0- 6            7.5-8.5   American Diabetes Association. Diabetes Care 33(S1), Jan 2010.       LDL-CHOLESTEROL   Date/Time Value Ref Range Status   02/19/2025 10:47 AM 67 mg/dL (calc) Final     Comment:     Reference range: <100     Desirable range <100 mg/dL for primary " prevention;    <70 mg/dL for patients with CHD or diabetic patients   with > or = 2 CHD risk factors.     LDL-C is now calculated using the Onelia   calculation, which is a validated novel method providing   better accuracy than the Friedewald equation in the   estimation of LDL-C.   Chuy OWUSU et al. ARYA. 2013;310(68): 1352-6897   (http://[employer].ePropertyData/faq/GBT367)       LDL Calculated   Date/Time Value Ref Range Status   04/23/2024 01:43 PM 70 <=99 mg/dL Final     Comment:                                 Near   Borderline      AGE      Desirable  Optimal    High     High     Very High     0-19 Y     0 - 109     ---    110-129   >/= 130     ----    20-24 Y     0 - 119     ---    120-159   >/= 160     ----      >24 Y     0 -  99   100-129  130-159   160-189     >/=190     08/09/2023 04:30 AM 95 65 - 130 MG/DL Final   04/07/2022 12:37 PM 68 65 - 130 MG/DL Final   12/30/2018 05:29  (H) 65 - 130 MG/DL Final     VLDL   Date/Time Value Ref Range Status   04/23/2024 01:43 PM 20 0 - 40 mg/dL Final   04/14/2023 11:35 AM 14 0 - 40 mg/dL Final   01/09/2023 11:34 AM 14 0 - 40 mg/dL Final   09/28/2022 11:10 AM 11 0 - 40 mg/dL Final      Last I/O:  I/O last 3 completed shifts:  In: 805 (6.9 mL/kg) [I.V.:805 (6.9 mL/kg)]  Out: - (0 mL/kg)   Weight: 116.4 kg     Past Cardiology Tests (Last 3 Years):  EKG:  Encounter Date: 03/18/25   Electrocardiogram, 12-lead PRN ACS symptoms   Result Value    Ventricular Rate 39    QRS Duration 102    QT Interval 360    QTC Calculation(Bazett) 289    R Axis -14    T Axis 5    QRS Count 7    Q Onset 209    T Offset 389    QTC Fredericia 312    Narrative    Junctional bradycardia  ST & T wave abnormality, consider anterior ischemia  Abnormal ECG  When compared with ECG of 06-DEC-2024 17:36,  Junctional rhythm has replaced Sinus rhythm  Vent. rate has decreased BY  31 BPM  Non-specific change in ST segment in Anterior leads  Nonspecific T wave abnormality now evident in  "Inferior leads  T wave inversion now evident in Anterior leads  QT has shortened       Echo:  No results found for this or any previous visit from the past 1095 days.    Ejection Fractions:  No results found for: \"EF\"  Cath:  No results found for this or any previous visit from the past 1095 days.    Stress Test:  Nuclear Stress Test     Cardiac Imaging:  No results found for this or any previous visit from the past 1095 days.      Past Medical History:  He has a past medical history of Anxiety, Atrial fibrillation (Multi), Body mass index (BMI)40.0-44.9, adult (04/26/2021), BPH (benign prostatic hyperplasia), Depression, H/O knee surgery, High cholesterol, History of back surgery, anticoagulation, Hypertension, Personal history of alcoholism (Multi), Prostate cancer (Multi), and Stress.    Past Surgical History:  He has a past surgical history that includes Other surgical history (07/01/2013); Other surgical history (07/01/2013); and Back surgery.      Social History:  He reports that he has never smoked. He has never used smokeless tobacco. He reports that he does not drink alcohol. No history on file for drug use.    Family History:  Family History   Family history unknown: Yes        Allergies:  Patient has no known allergies.    Inpatient Medications:  Scheduled medications   Medication Dose Route Frequency    amiodarone  200 mg oral Daily    amLODIPine  5 mg oral Daily    apixaban  5 mg oral q12h    aspirin  81 mg oral Nightly    chlordiazePOXIDE  25 mg oral Once    cloNIDine  0.1 mg oral TID    docusate sodium  100 mg oral BID    escitalopram  20 mg oral Daily    ezetimibe  10 mg oral Daily    famotidine  20 mg oral Daily    Or    famotidine  20 mg intravenous Daily    ferrous sulfate  1 tablet oral Daily    mirtazapine  45 mg oral Nightly    polyethylene glycol  17 g oral Daily     PRN medications   Medication    acetaminophen    Or    acetaminophen    Or    acetaminophen    benzocaine-menthol    " dextromethorphan-guaifenesin    guaiFENesin    melatonin    ondansetron    Or    ondansetron     Continuous Medications   Medication Dose Last Rate    dextrose 5%-0.45 % sodium chloride  100 mL/hr 100 mL/hr (03/19/25 0942)     Outpatient Medications:  Current Outpatient Medications   Medication Instructions    amiodarone (PACERONE) 200 mg, oral, Daily    apixaban (Eliquis) 5 mg tablet TAKE 1 TABLET BY MOUTH EVERY 12 HOURS    aspirin 81 mg, oral, Nightly    cloNIDine (CATAPRES) 0.1 mg, oral, 3 times daily    escitalopram (Lexapro) 20 mg tablet Take 1 tablet (20 mg) by mouth once daily in the morning.    ezetimibe (ZETIA) 10 mg, oral, Daily    ferrous sulfate 65 mg, oral, Daily    gabapentin (NEURONTIN) 400 mg, oral, 3 times daily    losartan (COZAAR) 50 mg, oral, Daily    metoprolol tartrate (LOPRESSOR) 50 mg, oral, 2 times daily    mirtazapine (REMERON) 45 mg, oral, Nightly    naltrexone (DEPADE) 50 mg, oral, Daily    rosuvastatin (CRESTOR) 40 mg, oral, Daily    solifenacin (VESICARE) 10 mg, oral, Daily, Swallow tablet whole; do not crush, chew, or split.       Physical Exam:  Appearance: Alert, oriented, cooperative, in no acute distress.     Eyes: Conjunctiva pink with no redness or exudates. Eyelids without lesions. No scleral icterus.     ENT: Hearing grossly intact. External inspection of ears without lesions or erythema. no nasal flaring. no tripoding, no drooling, no difficulty swallowing oral secretions.    Pulmonary: Clear bilaterally with good chest wall excursion. No rales, rhonchi or wheezing. No accessory muscle use or stridor. No difficulty completing a full sentence without taking a breath    Cardiac: Bradycardia, regular rhythm no rub, gallop. No JVD.    Musculoskeletal: No cyanosis, clubbing. No Lower extremity edema, capillary refill less than 2 seconds    Neurological: no focal findings identified.    Psychiatric: Appropriate mood and affect.      Assessment/Plan     Paroxysmal atrial fibrillation  on Eliquis  Chronic diastolic heart failure  Coronary artery disease, calcium score 370, (4/2022)  Hypertension  Chronic kidney disease stage III  Bradycardia  Alcohol abuse    3/19: As above, patient presenting with past medical history of chronic renal failure stage III, hypertension, coronary artery disease, diastolic heart failure, hyperlipidemia, paroxysmal atrial fibrillation on Eliquis, alcohol abuse, bilateral hip replacement, bradycardia, prostate cancer s/p radiation treatment, gastric sleeve presenting to the ER after a fall, syncopal episode, and 1.5 weeks of tremors.  Patient also reported feeling fatigued for the past several months.  New labs this morning show a sodium 138, potassium 4.6, BUN 27, improving creatinine 2.03, GFR 35, hemoglobin 13.1, hematocrit 41.4.  Blood pressure reviewed this morning 109/59, heart rate 44-45, 97% on room air. Patient denies prior dizziness, lightheadedness, palpitations, chest pain, shortness of breath, or feelings of fast heart rate.  In the outpatient setting patient is on guideline directed therapy with metoprolol tartrate 50 mg twice daily, losartan 50 mg daily, rosuvastatin 40 mg daily, and amiodarone 200 mg daily for rhythm control of atrial fibrillation, and Eliquis 5 mg twice daily for stroke risk reduction in the setting of atrial fibrillation.  Patient has a history of alcohol abuse, he drinks at least half a gallon of Eboni each day.  Patient has presented to the ER for alcohol intoxication, detox, 12/6/24, and alcoholic induced seizure.  Per patient he has 100-day sober from alcohol.  Cardiology was consulted due to bradycardia. Patient is unsure when his metoprolol was increased to twice a day.  Patient's metoprolol, was held due to bradycardia, and losartan due to acute kidney injury on chronic kidney disease.  On exam patient does not appear to be fluid overloaded, rhythm is regular, rate bradycardic.  On telemetry patient has remained bradycardic  with rates predominantly in the mid 40s.  Patient was given 5 mg of amlodipine this morning due to elevated blood pressure 160/90, again on patient's arrival to the ER he was mildly hypotensive, 95/55.  Bradycardia for this patient's likely caused by increased dose of metoprolol which has been discontinued.  Echocardiogram was ordered.  We will continue to follow this patient's blood pressure, and heart rates closely.    Peripheral IV 03/18/25 18 G Distal;Right;Upper;Anterior Arm (Active)   Site Assessment Clean;Dry;Intact 03/19/25 0736   Dressing Type Transparent 03/19/25 0736   Line Status Infusing 03/19/25 0736   Dressing Status Clean;Dry;Occlusive 03/19/25 0736   Number of days: 1       Code Status:  Full Code    I spent 60 minutes in the professional and overall care of this patient.        Sarah Mullen PA-C

## 2025-03-19 NOTE — H&P
History Of Present Illness  Dilshad Freeman is a 64 y.o. male presenting with fall.  Patient says he is feeling dizzy for 1-1/2-week.  Having tremors for last 1 and half week.  Today his legs gave out and he fell.  Did not pass out did not hit the head.  Patient has been 100-day sober from alcohol.  In the ER found to be bradycardic.  Patient's metoprolol dose was recently increased to 100 mg twice daily  Patient has history of hypertension, high cholesterol, paroxysmal A-fib, alcohol abuse, bilateral hip replacement, lumbar surgery, prostate cancer s/p radiation treatment, gastric sleeve surgery.  He lives alone.  Denies smoking     Past Medical History  Past Medical History:   Diagnosis Date    Anxiety     Atrial fibrillation (Multi)     Body mass index (BMI)40.0-44.9, adult 04/26/2021    BMI 40.0-44.9, adult    BPH (benign prostatic hyperplasia)     Depression     H/O knee surgery     High cholesterol     History of back surgery     HX: anticoagulation     Hypertension     Personal history of alcoholism (Multi)     Prostate cancer (Multi)     Stress        Surgical History  Past Surgical History:   Procedure Laterality Date    BACK SURGERY      OTHER SURGICAL HISTORY  07/01/2013    Reported Hx Of Hip Replacement - Left Side    OTHER SURGICAL HISTORY  07/01/2013    Reported Hx Of Hip Replacement - Right Side        Social History  He reports that he has never smoked. He has never used smokeless tobacco. He reports that he does not drink alcohol. No history on file for drug use.    Family History  Family History   Family history unknown: Yes        Allergies  Patient has no known allergies.    Review of Systems   Constitutional:  Negative for activity change, appetite change, chills, diaphoresis, fatigue, fever and unexpected weight change.   HENT:  Negative for congestion, dental problem, drooling, ear discharge, ear pain, facial swelling, hearing loss, mouth sores, nosebleeds, postnasal drip, rhinorrhea, sinus  pressure, sinus pain, sneezing, sore throat, tinnitus, trouble swallowing and voice change.    Eyes:  Negative for photophobia, pain, discharge, redness, itching and visual disturbance.   Respiratory:  Negative for apnea, cough, choking, chest tightness, shortness of breath, wheezing and stridor.    Cardiovascular:  Negative for chest pain, palpitations and leg swelling.   Gastrointestinal:  Negative for abdominal distention, abdominal pain, anal bleeding, blood in stool, constipation, diarrhea, nausea, rectal pain and vomiting.   Endocrine: Negative for cold intolerance, heat intolerance, polydipsia, polyphagia and polyuria.   Genitourinary:  Negative for decreased urine volume, difficulty urinating, dysuria, enuresis, flank pain, frequency, genital sores, hematuria and urgency.   Musculoskeletal:  Negative for arthralgias, back pain, gait problem, joint swelling, myalgias, neck pain and neck stiffness.   Skin:  Negative for color change, pallor, rash and wound.   Allergic/Immunologic: Negative for environmental allergies, food allergies and immunocompromised state.   Neurological:  Positive for tremors and light-headedness. Negative for dizziness, seizures, syncope, facial asymmetry, speech difficulty, weakness, numbness and headaches.   Hematological:  Negative for adenopathy. Does not bruise/bleed easily.   Psychiatric/Behavioral:  Negative for agitation, behavioral problems, confusion, decreased concentration, dysphoric mood, hallucinations, self-injury, sleep disturbance and suicidal ideas. The patient is not nervous/anxious and is not hyperactive.         Physical Exam  Vitals reviewed.   Constitutional:       Appearance: Normal appearance. He is obese.   HENT:      Head: Normocephalic and atraumatic.      Right Ear: Tympanic membrane, ear canal and external ear normal.      Left Ear: Tympanic membrane, ear canal and external ear normal.      Nose: Nose normal.      Mouth/Throat:      Pharynx: Oropharynx is  "clear.   Eyes:      Extraocular Movements: Extraocular movements intact.      Conjunctiva/sclera: Conjunctivae normal.      Pupils: Pupils are equal, round, and reactive to light.   Cardiovascular:      Rate and Rhythm: Normal rate and regular rhythm.      Pulses: Normal pulses.      Heart sounds: Normal heart sounds.   Pulmonary:      Effort: Pulmonary effort is normal.      Breath sounds: Normal breath sounds.   Abdominal:      General: Abdomen is flat. Bowel sounds are normal.      Palpations: Abdomen is soft.   Musculoskeletal:      Cervical back: Normal range of motion and neck supple.   Skin:     General: Skin is warm and dry.   Neurological:      General: No focal deficit present.      Mental Status: He is alert and oriented to person, place, and time.   Psychiatric:         Mood and Affect: Mood normal.          Last Recorded Vitals  Blood pressure 142/86, pulse (!) 45, temperature 36.4 °C (97.6 °F), temperature source Oral, resp. rate 14, height 1.727 m (5' 8\"), weight 111 kg (245 lb 9.5 oz), SpO2 100%.    Relevant Results      Scheduled medications  [START ON 3/19/2025] amiodarone, 200 mg, oral, Daily  apixaban, 5 mg, oral, q12h  aspirin, 81 mg, oral, Nightly  chlordiazePOXIDE, 25 mg, oral, Once  cloNIDine, 0.1 mg, oral, TID  docusate sodium, 100 mg, oral, BID  [START ON 3/19/2025] escitalopram, 20 mg, oral, Daily  [START ON 3/19/2025] ezetimibe, 10 mg, oral, Daily  [START ON 3/19/2025] famotidine, 20 mg, oral, Daily   Or  [START ON 3/19/2025] famotidine, 20 mg, intravenous, Daily  [START ON 3/19/2025] ferrous sulfate, 1 tablet, oral, Daily  mirtazapine, 45 mg, oral, Nightly  [START ON 3/19/2025] polyethylene glycol, 17 g, oral, Daily      Continuous medications  dextrose 5%-0.45 % sodium chloride, 100 mL/hr, Last Rate: 100 mL/hr (03/18/25 2232)      PRN medications  PRN medications: acetaminophen **OR** acetaminophen **OR** acetaminophen, benzocaine-menthol, dextromethorphan-guaifenesin, guaiFENesin, " melatonin, ondansetron **OR** ondansetron  Results for orders placed or performed during the hospital encounter of 03/18/25 (from the past 24 hours)   Acute Toxicology Panel, Blood   Result Value Ref Range    Acetaminophen <10.0 10.0 - 30.0 ug/mL    Salicylate  <3 4 - 20 mg/dL    Alcohol <10 <=10 mg/dL   CBC and Auto Differential   Result Value Ref Range    WBC 5.1 4.4 - 11.3 x10*3/uL    nRBC 0.0 0.0 - 0.0 /100 WBCs    RBC 3.91 (L) 4.50 - 5.90 x10*6/uL    Hemoglobin 12.0 (L) 13.5 - 17.5 g/dL    Hematocrit 37.0 (L) 41.0 - 52.0 %    MCV 95 80 - 100 fL    MCH 30.7 26.0 - 34.0 pg    MCHC 32.4 32.0 - 36.0 g/dL    RDW 13.2 11.5 - 14.5 %    Platelets 251 150 - 450 x10*3/uL    Neutrophils % 58.6 40.0 - 80.0 %    Immature Granulocytes %, Automated 0.4 0.0 - 0.9 %    Lymphocytes % 26.4 13.0 - 44.0 %    Monocytes % 9.1 2.0 - 10.0 %    Eosinophils % 4.9 0.0 - 6.0 %    Basophils % 0.6 0.0 - 2.0 %    Neutrophils Absolute 2.98 1.20 - 7.70 x10*3/uL    Immature Granulocytes Absolute, Automated 0.02 0.00 - 0.70 x10*3/uL    Lymphocytes Absolute 1.34 1.20 - 4.80 x10*3/uL    Monocytes Absolute 0.46 0.10 - 1.00 x10*3/uL    Eosinophils Absolute 0.25 0.00 - 0.70 x10*3/uL    Basophils Absolute 0.03 0.00 - 0.10 x10*3/uL   Comprehensive metabolic panel   Result Value Ref Range    Glucose 87 74 - 99 mg/dL    Sodium 134 (L) 136 - 145 mmol/L    Potassium 5.4 (H) 3.5 - 5.3 mmol/L    Chloride 103 98 - 107 mmol/L    Bicarbonate 28 21 - 32 mmol/L    Anion Gap 8 (L) 10 - 20 mmol/L    Urea Nitrogen 31 (H) 6 - 23 mg/dL    Creatinine 2.75 (H) 0.50 - 1.30 mg/dL    eGFR 25 (L) >60 mL/min/1.73m*2    Calcium 8.6 8.6 - 10.3 mg/dL    Albumin 3.7 3.4 - 5.0 g/dL    Alkaline Phosphatase 70 33 - 136 U/L    Total Protein 6.5 6.4 - 8.2 g/dL    AST 28 9 - 39 U/L    Bilirubin, Total 0.4 0.0 - 1.2 mg/dL    ALT 27 10 - 52 U/L   Magnesium   Result Value Ref Range    Magnesium 2.28 1.60 - 2.40 mg/dL   TSH with reflex to Free T4 if abnormal   Result Value Ref Range     Thyroid Stimulating Hormone 1.77 0.44 - 3.98 mIU/L   Urinalysis with Reflex Culture and Microscopic   Result Value Ref Range    Color, Urine Light-Yellow Light-Yellow, Yellow, Dark-Yellow    Appearance, Urine Clear Clear    Specific Gravity, Urine 1.012 1.005 - 1.035    pH, Urine 5.5 5.0, 5.5, 6.0, 6.5, 7.0, 7.5, 8.0    Protein, Urine NEGATIVE NEGATIVE, 10 (TRACE), 20 (TRACE) mg/dL    Glucose, Urine Normal Normal mg/dL    Blood, Urine NEGATIVE NEGATIVE mg/dL    Ketones, Urine NEGATIVE NEGATIVE mg/dL    Bilirubin, Urine NEGATIVE NEGATIVE mg/dL    Urobilinogen, Urine Normal Normal mg/dL    Nitrite, Urine NEGATIVE NEGATIVE    Leukocyte Esterase, Urine NEGATIVE NEGATIVE   Drug Screen, Urine   Result Value Ref Range    Amphetamine Screen, Urine Presumptive Negative Presumptive Negative    Barbiturate Screen, Urine Presumptive Negative Presumptive Negative    Benzodiazepines Screen, Urine Presumptive Negative Presumptive Negative    Cannabinoid Screen, Urine Presumptive Negative Presumptive Negative    Cocaine Metabolite Screen, Urine Presumptive Negative Presumptive Negative    Fentanyl Screen, Urine Presumptive Negative Presumptive Negative    Opiate Screen, Urine Presumptive Negative Presumptive Negative    Oxycodone Screen, Urine Presumptive Negative Presumptive Negative    PCP Screen, Urine Presumptive Negative Presumptive Negative    Methadone Screen, Urine Presumptive Negative Presumptive Negative     *Note: Due to a large number of results and/or encounters for the requested time period, some results have not been displayed. A complete set of results can be found in Results Review.     CT cervical spine wo IV contrast    Result Date: 3/18/2025  Interpreted By:  Ruel Avendano, STUDY: CT CERVICAL SPINE WO IV CONTRAST;  3/18/2025 4:58 pm   INDICATION: Signs/Symptoms:fall.     COMPARISON: None.   ACCESSION NUMBER(S): [id number]   ORDERING CLINICIAN: ERNESTINE WEST   TECHNIQUE: Axial CT images of the cervical spine are  obtained. Axial, coronal and sagittal reconstructions are provided for review.   FINDINGS:     Fractures: There is no evidence for an acute fracture of the cervical spine.   Vertebral Alignment: No posttraumatic malalignment. There is overall straightening of the normal cervical lordosis, presumed secondary to patient positioning or spasm.   Craniocervical Junction: The odontoid process and craniocervical junction are intact.   Vertebrae/Disc Spaces:  Multilevel spondylosis. Multilevel disc space narrowing. Multilevel facet arthropathy Multilevel uncovertebral hypertrophy.Multilevel osteophyte formation.   Prevertebral/Paraspinal Soft Tissues: The prevertebral and paraspinal soft tissues are unremarkable.         No evidence for an acute fracture or subluxation of the cervical spine.   MACRO: None   Signed by: Ruel Avendano 3/18/2025 5:10 PM Dictation workstation:   [id number]    CT head wo IV contrast    Result Date: 3/18/2025  Interpreted By:  Ruel Avendano, STUDY: CT HEAD WO IV CONTRAST;  3/18/2025 4:58 pm   INDICATION: Signs/Symptoms:fall from standing height.   COMPARISON: None.   ACCESSION NUMBER(S): [id number]   ORDERING CLINICIAN: ERNESTINE WEST   TECHNIQUE: Noncontrast axial CT scan of head was performed. Angled reformats in brain and bone windows were generated. The images were reviewed in bone, brain, blood and soft tissue windows.   FINDINGS: CSF Spaces: The ventricles, sulci and basal cisterns are within normal limits. There is no extraaxial fluid collection.   Parenchyma: Periventricular and subcortical white matter hypoattenuation is nonspecific, however likely reflects chronic microvascular ischemic versus chronic hypertensive changes. The grey-white differentiation is intact. There is no mass effect or midline shift.  There is no intracranial hemorrhage.   Calvarium: The calvarium is unremarkable.   Paranasal sinuses and mastoids: Mild mucosal thickening of the right maxillary sinus. Mastoid air  cells appear clear.       No CT evidence of acute intracranial injury.       MACRO: None     Signed by: Ruel Avendano 3/18/2025 5:09 PM Dictation workstation:   [id number]    XR chest 1 view    Result Date: 3/18/2025  Interpreted By:  Charla Delaney, STUDY: XR CHEST 1 VIEW;  3/18/2025 3:32 pm   INDICATION: Signs/Symptoms:weakness.   COMPARISON: CT abdomen pelvis 02/02/2024 Chest radiograph 08/13/2020   ACCESSION NUMBER(S): [id number]   ORDERING CLINICIAN: ERNESTINE WEST   FINDINGS: AP radiograph of the chest was provided.       CARDIOMEDIASTINAL SILHOUETTE: Cardiomediastinal silhouette is normal in size and configuration.   LUNGS: No consolidation, pulmonary edema, pleural effusion, or pneumothorax.   ABDOMEN: No remarkable upper abdominal findings.   BONES: No acute osseous changes.       No evidence of acute cardiopulmonary process.   Signed by: Charla Delaney 3/18/2025 3:49 PM Dictation workstation:   [id number]        Assessment/Plan   Assessment & Plan  Symptomatic bradycardia    Fall    Coarse tremors    Anxiety    Carcinoma of prostate (Multi)    CAD (coronary artery disease)    Diabetes mellitus without complication (Multi)    Dyslipidemia    Hypercholesterolemia    Morbid obesity (Multi)    Paroxysmal atrial fibrillation (Multi)    Lumbar postlaminectomy syndrome    Obstructive sleep apnea, adult    Seizure disorder (Multi)    Bariatric surgery status    DIA (acute kidney injury) (CMS-[facility])      Will consult cardiology  Hold metoprolol  Hold Neurontin which could be causing tremors  Patient is also in acute renal failure  Check ultrasound kidney and bladder  IV fluids  Hold losartan  DVT prophylaxis  Home meds   see orders for details         I spent  minutes in the professional and overall care of this patient.      Debra Alcantar MD

## 2025-03-19 NOTE — PROGRESS NOTES
Occupational Therapy                 Therapy Communication Note    Patient Name: Dilshda Freeman  MRN: [id number]  Department: [address]  Room: [address] [place] [address]A  Today's Date: 3/19/2025     Discipline: Occupational Therapy    OT Missed Visit: Yes     Missed Visit Reason: Cancel (Pt with a HR in the 40's and recent BP was 88/51. Nurse requested for skilled therapy to be canceled today.)    Missed Time: Cancel

## 2025-03-19 NOTE — PROGRESS NOTES
[facility]       Date:  3/19/2025  Patient:  Dilshad Freeman  YOB: 1960  MRN:  [id number]   Admit Date:  3/18/2025    Chief Complaint   Patient presents with    Dizziness     Pt states that he has been feeling dizzy and been having jerking motions. Pt states that his legs gave out and he fell. Did not hit head.          History of Present Illness:  Dilshad Freeman is a 64 y.o. year old male patient with Past Medical History of HTN, HLD, paroxysmal A-fib, alcohol use (sober for the past 100 days, hx of withdraw seizures), prostate cancer s/p radiation treatment who presented to the [facility] ED on 3/18 for dizziness leading to a fall. Of note, increased metoprolol to 100 mg BID in January 2025.    ER Course: Patient found to be bradycardic, patient received one dose of atropine with minimal improvement in HR. Also received magnesium replacement and 1L fluid bolus.    Hospitals Course: Admitted to [facility]. Patient hypertensive this am, received 5 mg Norvasc, 0.1 mg clonidine. Since then, patient remained hypotensive ( MAPs in the 50s). Received 500 mL fluid. Transfer to ICU to initiate dopamine gtt.      Interval ICU Events:  3/19: Admit to ICU for symptomatic bradycardia, requiring dopamine gtt.    Medical History:  Past Medical History:   Diagnosis Date    Anxiety     Atrial fibrillation (Multi)     Body mass index (BMI)40.0-44.9, adult 04/26/2021    BMI 40.0-44.9, adult    BPH (benign prostatic hyperplasia)     Depression     H/O knee surgery     High cholesterol     History of back surgery     HX: anticoagulation     Hypertension     Personal history of alcoholism (Multi)     Prostate cancer (Multi)     Stress      Past Surgical History:   Procedure Laterality Date    BACK SURGERY      OTHER SURGICAL HISTORY  07/01/2013    Reported Hx Of Hip Replacement - Left Side    OTHER SURGICAL HISTORY  07/01/2013    Reported Hx Of Hip Replacement - Right Side     Medications Prior to  Admission   Medication Sig Dispense Refill Last Dose/Taking    amiodarone (Pacerone) 200 mg tablet Take 1 tablet (200 mg) by mouth once daily. 90 tablet 0 3/18/2025 Morning    apixaban (Eliquis) 5 mg tablet TAKE 1 TABLET BY MOUTH EVERY 12 HOURS 180 tablet 3 3/18/2025 Morning    aspirin 81 mg chewable tablet Chew 1 tablet (81 mg) once daily at bedtime.   3/17/2025 Bedtime    cloNIDine (Catapres) 0.1 mg tablet TAKE 1 TABLET (0.1 MG) BY MOUTH 3 TIMES A DAY. 270 tablet 0 3/18/2025 Morning    escitalopram (Lexapro) 20 mg tablet Take 1 tablet (20 mg) by mouth once daily in the morning.   3/18/2025 Morning    ezetimibe (Zetia) 10 mg tablet TAKE 1 TABLET BY MOUTH EVERY DAY 90 tablet 0 3/17/2025 Bedtime    ferrous sulfate 324 mg (65 mg elemental iron) EC tablet (delayed release) Take 1 tablet (65 mg) by mouth once daily. 90 tablet 0 3/17/2025 Bedtime    gabapentin (Neurontin) 400 mg capsule Take 1 capsule (400 mg) by mouth 3 times a day. 90 capsule 0 3/18/2025 Morning    losartan (Cozaar) 50 mg tablet Take 1 tablet (50 mg) by mouth once daily. 90 tablet 0 3/17/2025 Noon    metoprolol tartrate (Lopressor) 50 mg tablet TAKE 1 TABLET BY MOUTH TWICE A  tablet 0 3/18/2025 Morning    naltrexone (Depade) 50 mg tablet Take 1 tablet (50 mg) by mouth once daily. 90 tablet 0 3/18/2025 Morning    solifenacin (Vesicare) 10 mg tablet Take 1 tablet (10 mg) by mouth once daily. Swallow tablet whole; do not crush, chew, or split. 90 tablet 1 3/18/2025 Morning    mirtazapine (Remeron) 45 mg tablet Take 1 tablet (45 mg) by mouth once daily at bedtime. 90 tablet 0 3/16/2025 Bedtime    rosuvastatin (Crestor) 40 mg tablet Take 1 tablet (40 mg) by mouth once daily. (Patient not taking: Reported on 3/18/2025) 90 tablet 0 Not Taking     Patient has no known allergies.  Social History     Tobacco Use    Smoking status: Never    Smokeless tobacco: Never   Substance Use Topics    Alcohol use: Never     Family History   Family history unknown:  Yes       Hospital Medications:    DOPamine, 0-5 mcg/kg/min          Current Facility-Administered Medications:     acetaminophen (Tylenol) tablet 650 mg, 650 mg, oral, q4h PRN **OR** acetaminophen (Tylenol) oral liquid 650 mg, 650 mg, oral, q4h PRN **OR** acetaminophen (Tylenol) suppository 650 mg, 650 mg, rectal, q4h PRN, Debra Alcantar MD    [Held by provider] amiodarone (Pacerone) tablet 200 mg, 200 mg, oral, Daily, Debra Alcantar MD, 200 mg at 03/19/25 0809    apixaban (Eliquis) tablet 5 mg, 5 mg, oral, q12h, Debra Alcantar MD, 5 mg at 03/19/25 0942    aspirin chewable tablet 81 mg, 81 mg, oral, Nightly, Debra Alcantar MD, 81 mg at 03/18/25 2232    benzocaine-menthol (Cepastat Sore Throat) lozenge 1 lozenge, 1 lozenge, Mouth/Throat, q2h PRN, Debra Alcantar MD    chlordiazePOXIDE (Librium) capsule 25 mg, 25 mg, oral, Once, Jeff Coe DO    [Held by provider] cloNIDine (Catapres) tablet 0.1 mg, 0.1 mg, oral, TID, Sarah Mullen PA-C, 0.1 mg at 03/19/25 0809    dextromethorphan-guaifenesin (Robitussin DM)  mg/5 mL oral liquid 5 mL, 5 mL, oral, q4h PRN, Debra Alcantar MD    docusate sodium (Colace) capsule 100 mg, 100 mg, oral, BID, Debra Alcantar MD, 100 mg at 03/19/25 0809    DOPamine (Intropin) 400 mg in dextrose 5% 250 mL (1.6 mg/mL) infusion (premix), 0-5 mcg/kg/min, intravenous, Continuous, Linette Kennedy PA-C    escitalopram (Lexapro) tablet 20 mg, 20 mg, oral, Daily, Debra Alcantar MD, 20 mg at 03/19/25 0809    ezetimibe (Zetia) tablet 10 mg, 10 mg, oral, Daily, Debra Alcantar MD, 10 mg at 03/19/25 0809    famotidine (Pepcid) tablet 20 mg, 20 mg, oral, Daily, 20 mg at 03/19/25 0809 **OR** famotidine PF (Pepcid) injection 20 mg, 20 mg, intravenous, Daily, Debra Alcantar MD    ferrous sulfate tablet 325 mg, 1 tablet, oral, Daily, Debra Alcantar MD, 325 mg at 03/19/25 0809    guaiFENesin (Mucinex) 12 hr tablet 600 mg, 600 mg, oral, q12h PRN, Debra Alcantar MD    melatonin tablet 3 mg, 3 mg, oral, Nightly PRN,  "Debra Alcantar MD    mirtazapine (Remeron) tablet 45 mg, 45 mg, oral, Nightly, Debra Alcantar MD, 45 mg at 03/18/25 2231    ondansetron (Zofran) tablet 4 mg, 4 mg, oral, q8h PRN **OR** ondansetron (Zofran) injection 4 mg, 4 mg, intravenous, q8h PRN, Debra Alcantar MD    polyethylene glycol (Glycolax, Miralax) packet 17 g, 17 g, oral, Daily, Debra Alcantar MD    sodium chloride 0.9 % bolus 500 mL, 500 mL, intravenous, Once, Sarah Mullen PA-C, Last Rate: 500 mL/hr at 03/19/25 1542, 500 mL at 03/19/25 1542    Review of Systems:  14 point review of systems was completed and negative except for those specially mention in my HPI    Physical Exam:    Heart Rate:  [40-50]   Temp:  [36.4 °C (97.5 °F)-36.5 °C (97.7 °F)]   Resp:  [9-18]   BP: ()/()   Height:  [172.7 cm (5' 8\")]   Weight:  [111 kg (245 lb 9.5 oz)-116 kg (256 lb 9.9 oz)]   SpO2:  [94 %-100 %]     Physical Exam  Constitutional:       Appearance: Normal appearance.   HENT:      Head: Normocephalic and atraumatic.      Nose: Nose normal.      Mouth/Throat:      Mouth: Mucous membranes are moist.      Pharynx: Oropharynx is clear.   Eyes:      Conjunctiva/sclera: Conjunctivae normal.      Pupils: Pupils are equal, round, and reactive to light.   Cardiovascular:      Rate and Rhythm: Bradycardia present.      Pulses: Normal pulses.      Heart sounds: Normal heart sounds.   Pulmonary:      Effort: Pulmonary effort is normal.      Breath sounds: Normal breath sounds.   Abdominal:      General: Abdomen is flat. Bowel sounds are normal.      Palpations: Abdomen is soft.   Musculoskeletal:         General: Normal range of motion.   Skin:     General: Skin is warm and dry.      Capillary Refill: Capillary refill takes less than 2 seconds.   Neurological:      General: No focal deficit present.      Mental Status: He is alert and oriented to person, place, and time.   Psychiatric:         Mood and Affect: Mood normal.         Behavior: Behavior normal. "       Objective:    I have reviewed all medications, laboratory results, and imaging pertinent for today's encounter.           Intake/Output Summary (Last 24 hours) at 3/19/2025 1552  Last data filed at 3/19/2025 1316  Gross per 24 hour   Intake 2363.34 ml   Output 750 ml   Net 1613.34 ml       Recent Imaging  US renal complete   Final Result   Normal ultrasound of the kidneys.        MACRO:   None.        Signed by: Rachael Leung 3/19/2025 8:09 AM   Dictation workstation:   [id number]      CT head wo IV contrast   Final Result   No CT evidence of acute intracranial injury.                  MACRO:   None             Signed by: Ruel Avendano 3/18/2025 5:09 PM   Dictation workstation:   [id number]      CT cervical spine wo IV contrast   Final Result   No evidence for an acute fracture or subluxation of the cervical   spine.        MACRO:   None        Signed by: Ruel Avendano 3/18/2025 5:10 PM   Dictation workstation:   [id number]      XR chest 1 view   Final Result   No evidence of acute cardiopulmonary process.        Signed by: Charla Delaney 3/18/2025 3:49 PM   Dictation workstation:   [id number]      Transthoracic Echo (TTE) Complete    (Results Pending)       No echocardiogram results found for the past 14 days    Assessment/Plan:    I am currently managing this critically ill patient for the following problems:    Neuro/Psych/Pain Ctrl/Sedation:  #S/p fall  #Hx alcohol use  CT head 3/18: no CT evidence of acute intracranial injury   CT cervical spine 3/18: no evidence of acute fracture or subluxation of the cervical spine  - Continue home Remeron, Lexapro   - Continue home naltrexone  - CAM ICU, delirium precaution    Respiratory/ENT:  #Hx MATHEUS  - Room air during the day, CPAP at night  - Pulmonary hygiene    Cardiovascular:  #Symptomatic bradycardia - junctional  #Hypotension 2/2 medication  #Hx paroxysmal Afib -takes eliquis at home  #Hx HTN  - Start dopamine gtt if MAPs < 60  - Continue home ASA, zetia,  statin  - Hold home antihypertensives (clonidine, metoprolol, Norvasc) d/t hypotension  - Hold amio d/t bradycardia  - Echo ordered  - EKGs as needed    GI:  No acute concerns  - Regular diet  - Colace and Miralax for bowel reg    Renal/Volume Status (Intra & Extravascular):  #DIA - pre-renal vs medication (takes ARB at home, discontinued)  - Crt 2.12, baseline 1.40  - Real U/s 3/19: normal  - Received 500 mL of fluid  - Replace electrolytes as indicated     Endocrine  No acute concerns  - TSH 1.77    Infectious Disease:  No acute concerns  - WBCs 6.2  - Monitor for SIRS    Heme/Onc:  No acute concerns  - Continue home iron  - Takes eliquis at home  - Hgb 11.8  - Transfuse for Hgb < 7.0    OBGYN/MSK:  - Ambulatory at baseline    Ethics/Code Status:  Full code, patient has capacity    :  DVT Prophylaxis: Eliquis, SCDs  GI Prophylaxis: NA  Bowel Regimen: Colace, miralax  Diet: Regular   CVC: NA  Ghada: NA  Du: NA  Restraints: NA  Dispo: ICU    Plan discussed with Dr. Cuadra    Critical Care Time:  40 minutes spent in preparing to see patient (I.e. review of medical records), evaluation of diagnostics (I.e. labs, imaging, etc.), documentation, discussing plan of care with patient/ family/ caregiver, and/ or coordination of care with multidisciplinary team. Time does not include completion of procedure time.      Linette Kennedy PA-C

## 2025-03-19 NOTE — PROGRESS NOTES
Physical Therapy                 Therapy Communication Note    Patient Name: Dilshad Freeman  MRN: [id number]  Department: [address]  Room: [address] [place] [address]A  Today's Date: 3/19/2025     Discipline: Physical Therapy    PT Missed Visit: Yes     Missed Visit Reason:  Cancel PT Evaluation - Pt admitted with symptomatic bradycardia with HR currently at 36 - 45 bpm and having low BP at 88/51. RN requested to cancel therapy today.

## 2025-03-19 NOTE — CARE PLAN
The patient's goals for the shift include rest    The clinical goals for the shift include effective heart rate/rhythm    Over the shift, the patient did not make progress toward the following goals. Barriers to progression include hr within normal range. Recommendations to address these barriers include hr.

## 2025-03-20 ENCOUNTER — APPOINTMENT (OUTPATIENT)
Dept: CARDIOLOGY | Facility: HOSPITAL | Age: 65
End: 2025-03-20
Payer: MEDICARE

## 2025-03-20 LAB
ALBUMIN SERPL BCP-MCNC: 3.3 G/DL (ref 3.4–5)
ALP SERPL-CCNC: 65 U/L (ref 33–136)
ALT SERPL W P-5'-P-CCNC: 32 U/L (ref 10–52)
ANION GAP SERPL CALCULATED.3IONS-SCNC: 9 MMOL/L (ref 10–20)
AORTIC VALVE MEAN GRADIENT: 5 MMHG
AORTIC VALVE PEAK VELOCITY: 1.54 M/S
AST SERPL W P-5'-P-CCNC: 31 U/L (ref 9–39)
ATRIAL RATE: 45 BPM
AV PEAK GRADIENT: 9 MMHG
AVA (PEAK VEL): 1.58 CM2
AVA (VTI): 1.48 CM2
BASOPHILS # BLD AUTO: 0.04 X10*3/UL (ref 0–0.1)
BASOPHILS NFR BLD AUTO: 0.7 %
BILIRUB SERPL-MCNC: 0.4 MG/DL (ref 0–1.2)
BUN SERPL-MCNC: 27 MG/DL (ref 6–23)
CALCIUM SERPL-MCNC: 8.4 MG/DL (ref 8.6–10.3)
CARDIAC TROPONIN I PNL SERPL HS: 4 NG/L (ref 0–20)
CHLORIDE SERPL-SCNC: 108 MMOL/L (ref 98–107)
CO2 SERPL-SCNC: 26 MMOL/L (ref 21–32)
CREAT SERPL-MCNC: 1.84 MG/DL (ref 0.5–1.3)
EGFRCR SERPLBLD CKD-EPI 2021: 40 ML/MIN/1.73M*2
EJECTION FRACTION APICAL 4 CHAMBER: 67.8
EJECTION FRACTION: 63 %
EOSINOPHIL # BLD AUTO: 0.3 X10*3/UL (ref 0–0.7)
EOSINOPHIL NFR BLD AUTO: 5.6 %
ERYTHROCYTE [DISTWIDTH] IN BLOOD BY AUTOMATED COUNT: 13.3 % (ref 11.5–14.5)
GLUCOSE SERPL-MCNC: 87 MG/DL (ref 74–99)
HCT VFR BLD AUTO: 38.1 % (ref 41–52)
HGB BLD-MCNC: 12.4 G/DL (ref 13.5–17.5)
IMM GRANULOCYTES # BLD AUTO: 0.01 X10*3/UL (ref 0–0.7)
IMM GRANULOCYTES NFR BLD AUTO: 0.2 % (ref 0–0.9)
LEFT ATRIUM VOLUME AREA LENGTH INDEX BSA: 28.7 ML/M2
LEFT VENTRICLE INTERNAL DIMENSION DIASTOLE: 6.53 CM (ref 3.5–6)
LEFT VENTRICULAR OUTFLOW TRACT DIAMETER: 2 CM
LV EJECTION FRACTION BIPLANE: 64 %
LYMPHOCYTES # BLD AUTO: 1.28 X10*3/UL (ref 1.2–4.8)
LYMPHOCYTES NFR BLD AUTO: 23.9 %
MAGNESIUM SERPL-MCNC: 1.97 MG/DL (ref 1.6–2.4)
MCH RBC QN AUTO: 30.5 PG (ref 26–34)
MCHC RBC AUTO-ENTMCNC: 32.5 G/DL (ref 32–36)
MCV RBC AUTO: 94 FL (ref 80–100)
MITRAL VALVE E/A RATIO: 0.77
MONOCYTES # BLD AUTO: 0.57 X10*3/UL (ref 0.1–1)
MONOCYTES NFR BLD AUTO: 10.6 %
NEUTROPHILS # BLD AUTO: 3.16 X10*3/UL (ref 1.2–7.7)
NEUTROPHILS NFR BLD AUTO: 59 %
NRBC BLD-RTO: 0 /100 WBCS (ref 0–0)
P AXIS: 69 DEGREES
PHOSPHATE SERPL-MCNC: 2.7 MG/DL (ref 2.5–4.9)
PLATELET # BLD AUTO: 210 X10*3/UL (ref 150–450)
POTASSIUM SERPL-SCNC: 5 MMOL/L (ref 3.5–5.3)
PR INTERVAL: 264 MS
PROT SERPL-MCNC: 6 G/DL (ref 6.4–8.2)
Q ONSET: 209 MS
Q ONSET: 209 MS
QRS COUNT: 7 BEATS
QRS COUNT: 7 BEATS
QRS DURATION: 102 MS
QRS DURATION: 104 MS
QT INTERVAL: 360 MS
QT INTERVAL: 510 MS
QTC CALCULATION(BAZETT): 289 MS
QTC CALCULATION(BAZETT): 441 MS
QTC FREDERICIA: 312 MS
QTC FREDERICIA: 463 MS
R AXIS: -14 DEGREES
R AXIS: -17 DEGREES
RBC # BLD AUTO: 4.06 X10*6/UL (ref 4.5–5.9)
RIGHT VENTRICLE FREE WALL PEAK S': 13.9 CM/S
RIGHT VENTRICLE PEAK SYSTOLIC PRESSURE: 23.7 MMHG
SODIUM SERPL-SCNC: 138 MMOL/L (ref 136–145)
T AXIS: 23 DEGREES
T AXIS: 5 DEGREES
T OFFSET: 389 MS
T OFFSET: 464 MS
TRICUSPID ANNULAR PLANE SYSTOLIC EXCURSION: 2.6 CM
VENTRICULAR RATE: 39 BPM
VENTRICULAR RATE: 45 BPM
WBC # BLD AUTO: 5.4 X10*3/UL (ref 4.4–11.3)

## 2025-03-20 PROCEDURE — 2500000004 HC RX 250 GENERAL PHARMACY W/ HCPCS (ALT 636 FOR OP/ED): Performed by: PHYSICIAN ASSISTANT

## 2025-03-20 PROCEDURE — 83735 ASSAY OF MAGNESIUM: CPT

## 2025-03-20 PROCEDURE — 9420000001 HC RT PATIENT EDUCATION 5 MIN

## 2025-03-20 PROCEDURE — 93306 TTE W/DOPPLER COMPLETE: CPT | Performed by: INTERNAL MEDICINE

## 2025-03-20 PROCEDURE — C8929 TTE W OR WO FOL WCON,DOPPLER: HCPCS

## 2025-03-20 PROCEDURE — 97165 OT EVAL LOW COMPLEX 30 MIN: CPT | Mod: GO

## 2025-03-20 PROCEDURE — 80053 COMPREHEN METABOLIC PANEL: CPT

## 2025-03-20 PROCEDURE — 84100 ASSAY OF PHOSPHORUS: CPT

## 2025-03-20 PROCEDURE — 99221 1ST HOSP IP/OBS SF/LOW 40: CPT | Performed by: INTERNAL MEDICINE

## 2025-03-20 PROCEDURE — 97161 PT EVAL LOW COMPLEX 20 MIN: CPT | Mod: GP | Performed by: PHYSICAL THERAPIST

## 2025-03-20 PROCEDURE — 84075 ASSAY ALKALINE PHOSPHATASE: CPT

## 2025-03-20 PROCEDURE — 1200000002 HC GENERAL ROOM WITH TELEMETRY DAILY

## 2025-03-20 PROCEDURE — 84484 ASSAY OF TROPONIN QUANT: CPT

## 2025-03-20 PROCEDURE — 2500000001 HC RX 250 WO HCPCS SELF ADMINISTERED DRUGS (ALT 637 FOR MEDICARE OP)

## 2025-03-20 PROCEDURE — 2500000001 HC RX 250 WO HCPCS SELF ADMINISTERED DRUGS (ALT 637 FOR MEDICARE OP): Performed by: INTERNAL MEDICINE

## 2025-03-20 PROCEDURE — 2500000002 HC RX 250 W HCPCS SELF ADMINISTERED DRUGS (ALT 637 FOR MEDICARE OP, ALT 636 FOR OP/ED)

## 2025-03-20 PROCEDURE — 94660 CPAP INITIATION&MGMT: CPT

## 2025-03-20 PROCEDURE — 85025 COMPLETE CBC W/AUTO DIFF WBC: CPT

## 2025-03-20 PROCEDURE — 99232 SBSQ HOSP IP/OBS MODERATE 35: CPT

## 2025-03-20 PROCEDURE — 99233 SBSQ HOSP IP/OBS HIGH 50: CPT | Performed by: INTERNAL MEDICINE

## 2025-03-20 PROCEDURE — 36415 COLL VENOUS BLD VENIPUNCTURE: CPT

## 2025-03-20 PROCEDURE — 2500000004 HC RX 250 GENERAL PHARMACY W/ HCPCS (ALT 636 FOR OP/ED)

## 2025-03-20 PROCEDURE — 2500000005 HC RX 250 GENERAL PHARMACY W/O HCPCS

## 2025-03-20 RX ORDER — MAGNESIUM SULFATE 1 G/100ML
1 INJECTION INTRAVENOUS ONCE
Status: COMPLETED | OUTPATIENT
Start: 2025-03-20 | End: 2025-03-20

## 2025-03-20 RX ORDER — AMLODIPINE BESYLATE 2.5 MG/1
2.5 TABLET ORAL DAILY
Status: DISCONTINUED | OUTPATIENT
Start: 2025-03-20 | End: 2025-03-21

## 2025-03-20 RX ADMIN — Medication 21 PERCENT: at 07:07

## 2025-03-20 RX ADMIN — APIXABAN 5 MG: 5 TABLET, FILM COATED ORAL at 20:27

## 2025-03-20 RX ADMIN — ASPIRIN 81 MG: 81 TABLET, CHEWABLE ORAL at 20:26

## 2025-03-20 RX ADMIN — Medication 21 PERCENT: at 19:46

## 2025-03-20 RX ADMIN — MAGNESIUM SULFATE IN DEXTROSE 1 G: 10 INJECTION, SOLUTION INTRAVENOUS at 05:43

## 2025-03-20 RX ADMIN — PERFLUTREN 3 ML OF DILUTION: 6.52 INJECTION, SUSPENSION INTRAVENOUS at 10:19

## 2025-03-20 RX ADMIN — Medication 21 PERCENT: at 00:11

## 2025-03-20 RX ADMIN — MIRTAZAPINE 45 MG: 15 TABLET, FILM COATED ORAL at 20:27

## 2025-03-20 RX ADMIN — Medication 3 MG: at 20:26

## 2025-03-20 RX ADMIN — DOCUSATE SODIUM 100 MG: 100 CAPSULE, LIQUID FILLED ORAL at 09:10

## 2025-03-20 RX ADMIN — APIXABAN 5 MG: 5 TABLET, FILM COATED ORAL at 09:42

## 2025-03-20 RX ADMIN — AMLODIPINE BESYLATE 2.5 MG: 2.5 TABLET ORAL at 09:10

## 2025-03-20 RX ADMIN — DOCUSATE SODIUM 100 MG: 100 CAPSULE, LIQUID FILLED ORAL at 20:26

## 2025-03-20 RX ADMIN — EZETIMIBE 10 MG: 10 TABLET ORAL at 09:11

## 2025-03-20 RX ADMIN — FERROUS SULFATE TAB 325 MG (65 MG ELEMENTAL FE) 325 MG: 325 (65 FE) TAB at 09:11

## 2025-03-20 RX ADMIN — ESCITALOPRAM OXALATE 20 MG: 20 TABLET ORAL at 09:11

## 2025-03-20 RX ADMIN — ROSUVASTATIN 40 MG: 20 TABLET, FILM COATED ORAL at 09:11

## 2025-03-20 ASSESSMENT — PAIN SCALES - GENERAL
PAINLEVEL_OUTOF10: 0 - NO PAIN

## 2025-03-20 ASSESSMENT — PAIN - FUNCTIONAL ASSESSMENT
PAIN_FUNCTIONAL_ASSESSMENT: 0-10

## 2025-03-20 ASSESSMENT — COGNITIVE AND FUNCTIONAL STATUS - GENERAL
MOBILITY SCORE: 18
MOVING TO AND FROM BED TO CHAIR: A LITTLE
WALKING IN HOSPITAL ROOM: A LITTLE
DAILY ACTIVITIY SCORE: 24
MOVING FROM LYING ON BACK TO SITTING ON SIDE OF FLAT BED WITH BEDRAILS: A LITTLE
TURNING FROM BACK TO SIDE WHILE IN FLAT BAD: A LITTLE
STANDING UP FROM CHAIR USING ARMS: A LITTLE
CLIMB 3 TO 5 STEPS WITH RAILING: A LITTLE

## 2025-03-20 ASSESSMENT — ACTIVITIES OF DAILY LIVING (ADL)
BATHING_ASSISTANCE: INDEPENDENT
ADL_ASSISTANCE: INDEPENDENT
ADL_ASSISTANCE: INDEPENDENT

## 2025-03-20 NOTE — PROGRESS NOTES
"Dilshad Freeman is a 64 y.o. male on day 2 of admission presenting with Symptomatic bradycardia.    Subjective   Patient states he feels good overall.  No dizziness or lightheadedness last night.  Feels better overall.       Objective     Physical Exam  Eyes:      Conjunctiva/sclera: Conjunctivae normal.   Cardiovascular:      Rate and Rhythm: Normal rate and regular rhythm.      Heart sounds:      No gallop.   Pulmonary:      Breath sounds: Normal breath sounds. No wheezing, rhonchi or rales.   Abdominal:      Palpations: Abdomen is soft.   Neurological:      General: No focal deficit present.      Mental Status: He is alert.       Constitutional:       Appearance: Not in distress.   Eyes:      Conjunctiva/sclera: Conjunctivae normal.   Neck:      Vascular: JVD normal.   Pulmonary:      Breath sounds: Normal breath sounds. No wheezing. No rhonchi. No rales.   Cardiovascular:      Normal rate. Regular rhythm.      Murmurs: There is no murmur.      No gallop.  No click. No rub.   Abdominal:      Palpations: Abdomen is soft.   Neurological:      General: No focal deficit present.      Mental Status: Alert.        Last Recorded Vitals  Blood pressure (!) 150/95, pulse (!) 48, temperature 36.5 °C (97.7 °F), temperature source Oral, resp. rate 16, height 1.727 m (5' 8\"), weight 114 kg (252 lb 3.3 oz), SpO2 99%.  Intake/Output last 3 Shifts:  I/O last 3 completed shifts:  In: 3185 (27.8 mL/kg) [P.O.:840; I.V.:1845 (16.1 mL/kg); IV Piggyback:500]  Out: 1525 (13.3 mL/kg) [Urine:1525 (0.4 mL/kg/hr)]  Weight: 114.4 kg     Relevant Results                 Results for orders placed or performed during the hospital encounter of 03/18/25 (from the past 24 hours)   Magnesium   Result Value Ref Range    Magnesium 2.22 1.60 - 2.40 mg/dL   Renal Function Panel   Result Value Ref Range    Glucose 78 74 - 99 mg/dL    Sodium 139 136 - 145 mmol/L    Potassium 4.9 3.5 - 5.3 mmol/L    Chloride 110 (H) 98 - 107 mmol/L    Bicarbonate 25 21 - " 32 mmol/L    Anion Gap 9 (L) 10 - 20 mmol/L    Urea Nitrogen 27 (H) 6 - 23 mg/dL    Creatinine 2.12 (H) 0.50 - 1.30 mg/dL    eGFR 34 (L) >60 mL/min/1.73m*2    Calcium 8.1 (L) 8.6 - 10.3 mg/dL    Phosphorus 2.8 2.5 - 4.9 mg/dL    Albumin 3.2 (L) 3.4 - 5.0 g/dL   CBC   Result Value Ref Range    WBC 6.2 4.4 - 11.3 x10*3/uL    nRBC 0.0 0.0 - 0.0 /100 WBCs    RBC 3.79 (L) 4.50 - 5.90 x10*6/uL    Hemoglobin 11.8 (L) 13.5 - 17.5 g/dL    Hematocrit 36.0 (L) 41.0 - 52.0 %    MCV 95 80 - 100 fL    MCH 31.1 26.0 - 34.0 pg    MCHC 32.8 32.0 - 36.0 g/dL    RDW 13.3 11.5 - 14.5 %    Platelets 223 150 - 450 x10*3/uL   Electrocardiogram, 12-lead PRN ACS symptoms   Result Value Ref Range    Ventricular Rate 45 BPM    Atrial Rate 45 BPM    LA Interval 264 ms    QRS Duration 104 ms    QT Interval 510 ms    QTC Calculation(Bazett) 441 ms    P Axis 69 degrees    R Axis -17 degrees    T Axis 23 degrees    QRS Count 7 beats    Q Onset 209 ms    T Offset 464 ms    QTC Fredericia 463 ms   CBC and Auto Differential   Result Value Ref Range    WBC 5.4 4.4 - 11.3 x10*3/uL    nRBC 0.0 0.0 - 0.0 /100 WBCs    RBC 4.06 (L) 4.50 - 5.90 x10*6/uL    Hemoglobin 12.4 (L) 13.5 - 17.5 g/dL    Hematocrit 38.1 (L) 41.0 - 52.0 %    MCV 94 80 - 100 fL    MCH 30.5 26.0 - 34.0 pg    MCHC 32.5 32.0 - 36.0 g/dL    RDW 13.3 11.5 - 14.5 %    Platelets 210 150 - 450 x10*3/uL    Neutrophils % 59.0 40.0 - 80.0 %    Immature Granulocytes %, Automated 0.2 0.0 - 0.9 %    Lymphocytes % 23.9 13.0 - 44.0 %    Monocytes % 10.6 2.0 - 10.0 %    Eosinophils % 5.6 0.0 - 6.0 %    Basophils % 0.7 0.0 - 2.0 %    Neutrophils Absolute 3.16 1.20 - 7.70 x10*3/uL    Immature Granulocytes Absolute, Automated 0.01 0.00 - 0.70 x10*3/uL    Lymphocytes Absolute 1.28 1.20 - 4.80 x10*3/uL    Monocytes Absolute 0.57 0.10 - 1.00 x10*3/uL    Eosinophils Absolute 0.30 0.00 - 0.70 x10*3/uL    Basophils Absolute 0.04 0.00 - 0.10 x10*3/uL   Comprehensive Metabolic Panel   Result Value Ref Range     Glucose 87 74 - 99 mg/dL    Sodium 138 136 - 145 mmol/L    Potassium 5.0 3.5 - 5.3 mmol/L    Chloride 108 (H) 98 - 107 mmol/L    Bicarbonate 26 21 - 32 mmol/L    Anion Gap 9 (L) 10 - 20 mmol/L    Urea Nitrogen 27 (H) 6 - 23 mg/dL    Creatinine 1.84 (H) 0.50 - 1.30 mg/dL    eGFR 40 (L) >60 mL/min/1.73m*2    Calcium 8.4 (L) 8.6 - 10.3 mg/dL    Albumin 3.3 (L) 3.4 - 5.0 g/dL    Alkaline Phosphatase 65 33 - 136 U/L    Total Protein 6.0 (L) 6.4 - 8.2 g/dL    AST 31 9 - 39 U/L    Bilirubin, Total 0.4 0.0 - 1.2 mg/dL    ALT 32 10 - 52 U/L   Magnesium   Result Value Ref Range    Magnesium 1.97 1.60 - 2.40 mg/dL   Phosphorus   Result Value Ref Range    Phosphorus 2.7 2.5 - 4.9 mg/dL   Troponin I, High Sensitivity   Result Value Ref Range    Troponin I, High Sensitivity 4 0 - 20 ng/L     *Note: Due to a large number of results and/or encounters for the requested time period, some results have not been displayed. A complete set of results can be found in Results Review.                 Assessment/Plan   Assessment & Plan  Symptomatic bradycardia    Anxiety    Bariatric surgery status    Hypercholesterolemia    Lumbar postlaminectomy syndrome    Morbid obesity (Multi)    Obstructive sleep apnea, adult    Carcinoma of prostate (Multi)    Diabetes mellitus without complication (Multi)    Dyslipidemia    Seizure disorder (Multi)    Paroxysmal atrial fibrillation (Multi)    CAD (coronary artery disease)    Fall    Coarse tremors    DIA (acute kidney injury) (CMS-[facility])    Paroxysmal atrial fibrillation on Eliquis  Chronic diastolic heart failure  Coronary artery disease, calcium score 370, (4/2022)  Hypertension  Chronic kidney disease stage III  Bradycardia  Alcohol abuse     3/19: As above, patient presenting with past medical history of chronic renal failure stage III, hypertension, coronary artery disease, diastolic heart failure, hyperlipidemia, paroxysmal atrial fibrillation on Eliquis, alcohol abuse, bilateral hip replacement,  bradycardia, prostate cancer s/p radiation treatment, gastric sleeve presenting to the ER after a fall, syncopal episode, and 1.5 weeks of tremors.  Patient also reported feeling fatigued for the past several months.  New labs this morning show a sodium 138, potassium 4.6, BUN 27, improving creatinine 2.03, GFR 35, hemoglobin 13.1, hematocrit 41.4.  Blood pressure reviewed this morning 109/59, heart rate 44-45, 97% on room air. Patient denies prior dizziness, lightheadedness, palpitations, chest pain, shortness of breath, or feelings of fast heart rate.  In the outpatient setting patient is on guideline directed therapy with metoprolol tartrate 50 mg twice daily, losartan 50 mg daily, rosuvastatin 40 mg daily, and amiodarone 200 mg daily for rhythm control of atrial fibrillation, and Eliquis 5 mg twice daily for stroke risk reduction in the setting of atrial fibrillation.  Patient has a history of alcohol abuse, he drinks at least half a gallon of Eboni each day.  Patient has presented to the ER for alcohol intoxication, detox, 12/6/24, and alcoholic induced seizure.  Per patient he has 100-day sober from alcohol.  Cardiology was consulted due to bradycardia. Patient is unsure when his metoprolol was increased to twice a day.  Patient's metoprolol, was held due to bradycardia, and losartan due to acute kidney injury on chronic kidney disease.  On exam patient does not appear to be fluid overloaded, rhythm is regular, rate bradycardic.  On telemetry patient has remained bradycardic with rates predominantly in the mid 40s.  Patient was given 5 mg of amlodipine this morning due to elevated blood pressure 160/90, again on patient's arrival to the ER he was mildly hypotensive, 95/55.  Bradycardia for this patient's likely caused by increased dose of metoprolol which has been discontinued.  Echocardiogram was ordered.  We will continue to follow this patient's blood pressure, and heart rates closely.    3/20: Yesterday  after given a dose of amlodipine in the morning patient's blood pressure dropped significantly.  Given IV fluid boluses but blood pressure remained in the 70 systolic range thus was transferred to the ICU for IV pressor agents.  Blood pressure has improved over the last 12 hours and currently 150/90.  We had stopped both the metoprolol and the clonidine yesterday.  Adding an antihypertensive with no rate modulating properties would be reasonable.  Will thus add amlodipine at 2.5 mg daily.  Follow blood pressures.  Okay to transfer out of the ICU back to a monitored unit.  Also discontinued the patient's amiodarone given the symptomatic bradycardia.  Will place consultation with Dr. Kiran for recommendations of whether we should resume amiodarone therapy, would pacemaker be recommended or not, or other recommendations in treatment of the atrial fibrillation.  Continue the apixaban for stroke prevention.  Echocardiogram study has been ordered and will be reviewed today.  Serum creatinine has decreased down to 1.8 on morning laboratory studies.  Overall clinically improved.       I spent 30 minutes in the professional and overall care of this patient.      Casey Weiner,

## 2025-03-20 NOTE — CARE PLAN
Problem: Pain - Adult  Goal: Verbalizes/displays adequate comfort level or baseline comfort level  Outcome: Progressing     Problem: Safety - Adult  Goal: Free from fall injury  Outcome: Progressing     Problem: Discharge Planning  Goal: Discharge to home or other facility with appropriate resources  Outcome: Progressing     Problem: Chronic Conditions and Co-morbidities  Goal: Patient's chronic conditions and co-morbidity symptoms are monitored and maintained or improved  Outcome: Progressing     Problem: Nutrition  Goal: Nutrient intake appropriate for maintaining nutritional needs  Outcome: Progressing     Problem: Pain  Goal: Takes deep breaths with improved pain control throughout the shift  Outcome: Progressing  Goal: Turns in bed with improved pain control throughout the shift  Outcome: Progressing  Goal: Walks with improved pain control throughout the shift  Outcome: Progressing  Goal: Performs ADL's with improved pain control throughout shift  Outcome: Progressing  Goal: Participates in PT with improved pain control throughout the shift  Outcome: Progressing  Goal: Free from opioid side effects throughout the shift  Outcome: Progressing  Goal: Free from acute confusion related to pain meds throughout the shift  Outcome: Progressing   The patient's goals for the shift include rest    The clinical goals for the shift include patient will remain hemodynamically stable    Over the shift, the patient did not make progress toward the following goals. Barriers to progression include . Recommendations to address these barriers include .

## 2025-03-20 NOTE — CONSULTS
Inpatient consult to Cardiology  Consult performed by: Alvarez Kiran MD  Consult ordered by: Linette Kennedy PA-C  Reason for consult: Bradycardia  Assessment/Recommendations: Please see below        History Of Present Illness:    Dilshad Freeman is a 64 y.o. male with a history of hypertension, paroxysmal atrial fibrillation, and coronary artery disease.  This is in the setting of preserved LV systolic function.  He was admitted with presyncope and a fall with symptoms of lightheadedness and dizziness in the setting of utilizing amiodarone and beta-blockers.  He has significant bradycardia and his beta-blockers and amiodarone have not been appropriately held.  He currently feels well and his heart rate has normalized.  As far as his atrial fibrillation history he has never required cardioversion to his knowledge nor has he had catheter-based therapy.     Last Recorded Vitals:  Vitals:    03/20/25 1300 03/20/25 1400 03/20/25 1600 03/20/25 1629   BP: (!) 157/138  (!) 158/96 155/83   BP Location:    Left arm   Patient Position:    Sitting   Pulse: 60 57 60 59   Resp: 20 18 12 12   Temp:   36.4 °C (97.5 °F) 36.8 °C (98.2 °F)   TempSrc:   Oral Oral   SpO2:    100%   Weight:       Height:           Last Labs:  CBC - 3/20/2025:  4:09 AM  5.4 12.4 210    38.1      CMP - 3/20/2025:  4:09 AM  8.4 6.0 31 --- 0.4   2.7 3.3 32 65      PTT - No results in last year.  _   _ _     Troponin I, High Sensitivity   Date/Time Value Ref Range Status   03/20/2025 04:09 AM 4 0 - 20 ng/L Final     Hemoglobin A1C   Date/Time Value Ref Range Status   09/28/2022 11:10 AM 5.4 % Final     Comment:          Diagnosis of Diabetes-Adults   Non-Diabetic: < or = 5.6%   Increased risk for developing diabetes: 5.7-6.4%   Diagnostic of diabetes: > or = 6.5%  .       Monitoring of Diabetes                Age (y)     Therapeutic Goal (%)   Adults:          >18           <7.0   Pediatrics:    13-18           <7.5                   7-12            <8.0                   0- 6            7.5-8.5   American Diabetes Association. Diabetes Care 33(S1), Jan 2010.     06/24/2022 10:35 AM 5.2 % Final     Comment:          Diagnosis of Diabetes-Adults   Non-Diabetic: < or = 5.6%   Increased risk for developing diabetes: 5.7-6.4%   Diagnostic of diabetes: > or = 6.5%  .       Monitoring of Diabetes                Age (y)     Therapeutic Goal (%)   Adults:          >18           <7.0   Pediatrics:    13-18           <7.5                   7-12           <8.0                   0- 6            7.5-8.5   American Diabetes Association. Diabetes Care 33(S1), Jan 2010.       LDL-CHOLESTEROL   Date/Time Value Ref Range Status   02/19/2025 10:47 AM 67 mg/dL (calc) Final     Comment:     Reference range: <100     Desirable range <100 mg/dL for primary prevention;    <70 mg/dL for patients with CHD or diabetic patients   with > or = 2 CHD risk factors.     LDL-C is now calculated using the Onelia   calculation, which is a validated novel method providing   better accuracy than the Friedewald equation in the   estimation of LDL-C.   Chuy OWUSU et al. ARYA. 2013;310(19): 7176-2933   (http://education.tidy.com/faq/SVX833)       LDL Calculated   Date/Time Value Ref Range Status   04/23/2024 01:43 PM 70 <=99 mg/dL Final     Comment:                                 Near   Borderline      AGE      Desirable  Optimal    High     High     Very High     0-19 Y     0 - 109     ---    110-129   >/= 130     ----    20-24 Y     0 - 119     ---    120-159   >/= 160     ----      >24 Y     0 -  99   100-129  130-159   160-189     >/=190     08/09/2023 04:30 AM 95 65 - 130 MG/DL Final   04/07/2022 12:37 PM 68 65 - 130 MG/DL Final   12/30/2018 05:29  (H) 65 - 130 MG/DL Final     VLDL   Date/Time Value Ref Range Status   04/23/2024 01:43 PM 20 0 - 40 mg/dL Final   04/14/2023 11:35 AM 14 0 - 40 mg/dL Final   01/09/2023 11:34 AM 14 0 - 40 mg/dL Final   09/28/2022 11:10 AM 11  0 - 40 mg/dL Final      Last I/O:  I/O last 3 completed shifts:  In: 3185 (27.8 mL/kg) [P.O.:840; I.V.:1845 (16.1 mL/kg); IV Piggyback:500]  Out: 1525 (13.3 mL/kg) [Urine:1525 (0.4 mL/kg/hr)]  Weight: 114.4 kg     Past Cardiology Tests (Last 3 Years):  EKG:  Electrocardiogram, 12-lead PRN ACS symptoms 03/19/2025 (Preliminary)      Electrocardiogram, 12-lead PRN ACS symptoms 03/18/2025 (Preliminary)      ECG 12 lead 12/06/2024      ECG 12 Lead 04/24/2024    Echo:  Transthoracic Echo (TTE) Complete 03/20/2025    Ejection Fractions:  EF   Date/Time Value Ref Range Status   03/20/2025 10:19 AM 63 %      Cath:  No results found for this or any previous visit from the past 1095 days.    Stress Test:  Nuclear Stress Test     Cardiac Imaging:  No results found for this or any previous visit from the past 1095 days.      Past Medical History:  He has a past medical history of Anxiety, Atrial fibrillation (Multi), Body mass index (BMI)40.0-44.9, adult (04/26/2021), BPH (benign prostatic hyperplasia), Depression, H/O knee surgery, High cholesterol, History of back surgery, anticoagulation, Hypertension, Personal history of alcoholism (Multi), Prostate cancer (Multi), and Stress.    Past Surgical History:  He has a past surgical history that includes Other surgical history (07/01/2013); Other surgical history (07/01/2013); and Back surgery.      Social History:  He reports that he has never smoked. He has never used smokeless tobacco. He reports that he does not drink alcohol. No history on file for drug use.    Family History:  Family History   Family history unknown: Yes        Allergies:  Patient has no known allergies.    Inpatient Medications:  Scheduled medications   Medication Dose Route Frequency    [Held by provider] amiodarone  200 mg oral Daily    amLODIPine  2.5 mg oral Daily    apixaban  5 mg oral q12h    aspirin  81 mg oral Nightly    [Held by provider] cloNIDine  0.1 mg oral TID    docusate sodium  100 mg oral  BID    escitalopram  20 mg oral Daily    ezetimibe  10 mg oral Daily    ferrous sulfate  1 tablet oral Daily    mirtazapine  45 mg oral Nightly    oxygen   inhalation Continuous - Inhalation    polyethylene glycol  17 g oral Daily    rosuvastatin  40 mg oral Daily     PRN medications   Medication    acetaminophen    Or    acetaminophen    Or    acetaminophen    melatonin    ondansetron    Or    ondansetron     Continuous Medications   Medication Dose Last Rate     Outpatient Medications:  Current Outpatient Medications   Medication Instructions    amiodarone (PACERONE) 200 mg, oral, Daily    apixaban (Eliquis) 5 mg tablet TAKE 1 TABLET BY MOUTH EVERY 12 HOURS    aspirin 81 mg, oral, Nightly    cloNIDine (CATAPRES) 0.1 mg, oral, 3 times daily    escitalopram (Lexapro) 20 mg tablet Take 1 tablet (20 mg) by mouth once daily in the morning.    ezetimibe (ZETIA) 10 mg, oral, Daily    ferrous sulfate 65 mg, oral, Daily    gabapentin (NEURONTIN) 400 mg, oral, 3 times daily    losartan (COZAAR) 50 mg, oral, Daily    metoprolol tartrate (LOPRESSOR) 50 mg, oral, 2 times daily    mirtazapine (REMERON) 45 mg, oral, Nightly    naltrexone (DEPADE) 50 mg, oral, Daily    rosuvastatin (CRESTOR) 40 mg, oral, Daily    solifenacin (VESICARE) 10 mg, oral, Daily, Swallow tablet whole; do not crush, chew, or split.       Physical Exam:  General: Alert, oriented to person, place, date/time, pleasant  HEENT: Normocephalic, eyes normal ears normal  Heart: Normal S1, S2 , rhythm regular, no rubs or gallops   Respiratory: Lungs clear to auscultation bilaterally, no rales, crackles, wheezes or rhonchi  Abdomen: Bowel sounds present in all quadrants  Extremities: No upper or lower extremity edema appreciated  Dermatology: Skin Normal  Genitourinary: Deferred  Neurological: Nonfocal, moves all extremities  Psychology :Appropriate affect and mood      Assessment/Plan   Bradycardia and paroxysmal atrial fibrillation  History as noted above.  I  would continue to hold amiodarone especially in the setting as it is not ideal for someone his age to be taking long-term regardless of his bradycardia.  I think he would benefit from a low-dose of beta-blockers at 12.5 mg metoprolol twice a day for now without antiarrhythmic drugs on board.  Given the extended half-life of amiodarone it will take about a month until we no longer see its effects.  No indication for pacemaker at this time.  Peripheral IV 03/18/25 18 G Distal;Right;Upper;Anterior Arm (Active)   Site Assessment Clean;Dry;Intact 03/20/25 0831   Dressing Status Clean;Dry 03/20/25 0831   Number of days: 2       Code Status:  Full Code    I spent 30 minutes in the professional and overall care of this patient.        Alvarez Kiran MD

## 2025-03-20 NOTE — PROGRESS NOTES
Physical Therapy    Physical Therapy Evaluation    Patient Name: Dilshad Freeman  MRN: [id number]  Department: [address] ICU  Room: 08/08-A  Today's Date: 3/20/2025   Time Calculation  Start Time: 0941  Stop Time: 0956  Time Calculation (min): 15 min    Assessment/Plan   PT Assessment  PT Assessment Results: Decreased strength, Decreased mobility, Decreased coordination, Impaired balance  Rehab Prognosis: Good  Barriers to Discharge Home: No anticipated barriers  Strengths: Support of extended family/friends, Rehab experience, Premorbid level of function, Living arrangement secure, Housing layout, Attitude of self  Barriers to Participation: Comorbidities  End of Session Communication: Bedside nurse  Assessment Comment: He presented with the above listed impairments (see Assessment Results). Pt required CGA during functional mobility compared to his reported baseline of indep. Pt would benefit from continued skilled PT services for maximizing independence and safety prior to & after discharge (LOW intensity).  End of Session Patient Position: Up in room, Up in chair, Alarm off, not on at start of session (call light and needs within reach)  IP OR SWING BED PT PLAN  Inpatient or Swing Bed: Inpatient  PT Plan  Treatment/Interventions: Bed mobility, Transfer training, Gait training, Strengthening, Therapeutic exercise, Therapeutic activity, Balance training  PT Plan: Ongoing PT  PT Frequency: 5 times per week  PT Discharge Recommendations: Low intensity level of continued care  PT Recommended Transfer Status: Stand by assist, Contact guard  PT - OK to Discharge: Yes    Subjective   General Visit Information:  General  Reason for Referral: Impaired functional mobility due to decreased muscular strength. This 64 year old was admitted for symptomatic bradycardia, fall, coarse tremors, and DIA.  Past Medical History Relevant to Rehab: A-fib, anxiety/depression, BPH, CKD-3, CAD, DM, HTN, alcoholism, prostate cancer, heart  failure, lumbar post laminectomy syndrome, seizure disorder, MATHEUS, lupus, hyperparathyroidism, bilat PIERCE  Family/Caregiver Present: No  Co-Treatment: OT  Co-Treatment Reason: co-eval for maximal safety during functional mobility  Prior to Session Communication: Bedside nurse  Patient Position Received: Up in chair, Alarm off, not on at start of session  General Comment: Cleared by RN for participation. Pt agreed to session and was fully engaged throughout.    Home Living:  Home Living  Type of Home: Apartment  Lives With: Alone  Home Adaptive Equipment: Cane  Home Layout: One level (laundry room next door to apartment)  Home Access: Level entry (to 1st floor apartment)  Bathroom Shower/Tub: Tub/shower unit  Bathroom Toilet: Standard  Bathroom Equipment: Grab bars in shower, Shower chair without back  Home Living Comments: Sister could provide some assist, if needed.    Prior Level of Function:  Prior Function Per Pt/Caregiver Report  ADL Assistance: Independent  Homemaking Assistance: Independent  Ambulatory Assistance: Independent (indoors; mod I with cane in community. Denied any other falls in the past 6 months.)  Vocational: Retired  Prior Function Comments: (+) driving    Precautions:  Precautions  Hearing/Visual Limitations: glasses all the time, hearing WFL  Medical Precautions: Fall precautions     Vital Signs  Pre PT in supported sitting: HR 51, /88,      Objective   Pain:  Pain Assessment  0-10 (Numeric) Pain Score: 0 - No pain    Cognition:  Cognition  Overall Cognitive Status: Within Functional Limits  Orientation Level: Oriented X4    General Assessments:  Activity Tolerance  Endurance: Tolerates 10 - 20 min exercise with multiple rests    Sensation  Sensation Comment: Not tested; pt reported tingling in bilat hands    ROM  BLE AROM: grossly WFL    Strength  Strength Comments: BLE: grossly >/=3+/5    Coordination  Movements are Fluid and Coordinated: No (BUE tremors noted)    Postural  Control  Postural Control: Within Functional Limits    Static Standing Balance  Static Standing-Balance Support: No upper extremity supported  Static Standing-Level of Assistance: Contact guard  Dynamic Standing Balance  Dynamic Standing-Balance Support: No upper extremity supported  Dynamic Standing-Level of Assistance: Contact guard    Functional Assessments:  Transfers  Transfer: Yes  Transfer 1  Technique 1: Sit to stand, Stand to sit  Transfer Level of Assistance 1: Contact guard (x2 trials at bedside chair)    Ambulation/Gait Training  Ambulation/Gait Training Performed: Yes  Ambulation/Gait Training 1  Surface 1: Level tile  Device 1: No device  Assistance 1: Contact guard  Quality of Gait 1: Decreased step length, Wide base of support, Diminished heel strike (Pt ambulated ~20 ft using minimal step through pattern with slowed velocity and continuous movement. Fairly steady gait with no overt threat for LOB.)     Outcome Measures:  [facility] Basic Mobility  Turning from your back to your side while in a flat bed without using bedrails: A little  Moving from lying on your back to sitting on the side of a flat bed without using bedrails: A little  Moving to and from bed to chair (including a wheelchair): A little  Standing up from a chair using your arms (e.g. wheelchair or bedside chair): A little  To walk in hospital room: A little  Climbing 3-5 steps with railing: A little  Basic Mobility - Total Score: 18    FSS-ICU  Ambulation: Walks <50 feet with any assistance x1 or walks any distance with assistance x2 people  Rolling: Supervision or set-up only  Sitting: Supervision or set-up only  Transfer Sit-to-Stand: Minimal assistance (performs 75% or more of task)  Transfer Supine-to-Sit: Minimal assistance (performs 75% or more of task)  Total Score: 19    Encounter Problems       Encounter Problems (Active)       PT Problem       Pt will transition supine<>sit with mod I.        Start:  03/20/25    Expected End:   04/06/25            Pt will transfer sit<>stand with straight cane & mod I.        Start:  03/20/25    Expected End:  04/06/25            Pt will ambulate >/=100 ft with straight cane & mod I.        Start:  03/20/25    Expected End:  04/06/25                   Education Documentation  Precautions, taught by Franny Nieves PT at 3/20/2025 11:39 AM.  Learner: Patient  Readiness: Acceptance  Method: Explanation  Response: Verbalizes Understanding, Needs Reinforcement  Comment: Fall precautions, PT role and POC.    Mobility Training, taught by Franny Nieves PT at 3/20/2025 11:39 AM.  Learner: Patient  Readiness: Acceptance  Method: Explanation  Response: Verbalizes Understanding, Needs Reinforcement  Comment: Fall precautions, PT role and POC.    Education Comments  No comments found.

## 2025-03-20 NOTE — PROGRESS NOTES
[facility] Critical Care Medicine       Date:  3/20/2025  Patient:  Dilshad Freeman  YOB: 1960  MRN:  [id number]   Admit Date:  3/18/2025    Chief Complaint   Patient presents with    Dizziness     Pt states that he has been feeling dizzy and been having jerking motions. Pt states that his legs gave out and he fell. Did not hit head.          History of Present Illness:  Dilshad Freeman is a 64 y.o. year old male patient with Past Medical History of HTN, HLD, paroxysmal A-fib, alcohol use (sober for the past 100 days, hx of withdraw seizures), prostate cancer s/p radiation treatment who presented to the [facility] ED on 3/18 for dizziness leading to a fall. Of note, increased metoprolol to 100 mg BID in January 2025.    ER Course: Patient found to be bradycardic, patient received one dose of atropine with minimal improvement in HR. Also received magnesium replacement and 1L fluid bolus.    Hospitals Course: Admitted to [facility]. Patient hypertensive this am, received 5 mg Norvasc, 0.1 mg clonidine. Since then, patient remained hypotensive ( MAPs in the 50s). Received 500 mL fluid. Transfer to ICU to initiate dopamine gtt.      Interval ICU Events:  3/19: Admit to ICU for symptomatic bradycardia, requiring dopamine gtt.    3/20: Blood pressure stable, didn't require dopamine gtt OVN. Downgrade.     Medical History:  Past Medical History:   Diagnosis Date    Anxiety     Atrial fibrillation (Multi)     Body mass index (BMI)40.0-44.9, adult 04/26/2021    BMI 40.0-44.9, adult    BPH (benign prostatic hyperplasia)     Depression     H/O knee surgery     High cholesterol     History of back surgery     HX: anticoagulation     Hypertension     Personal history of alcoholism (Multi)     Prostate cancer (Multi)     Stress      Past Surgical History:   Procedure Laterality Date    BACK SURGERY      OTHER SURGICAL HISTORY  07/01/2013    Reported Hx Of Hip Replacement - Left Side    OTHER SURGICAL HISTORY  07/01/2013     Reported Hx Of Hip Replacement - Right Side     Medications Prior to Admission   Medication Sig Dispense Refill Last Dose/Taking    amiodarone (Pacerone) 200 mg tablet Take 1 tablet (200 mg) by mouth once daily. 90 tablet 0 3/18/2025 Morning    apixaban (Eliquis) 5 mg tablet TAKE 1 TABLET BY MOUTH EVERY 12 HOURS 180 tablet 3 3/18/2025 Morning    aspirin 81 mg chewable tablet Chew 1 tablet (81 mg) once daily at bedtime.   3/17/2025 Bedtime    cloNIDine (Catapres) 0.1 mg tablet TAKE 1 TABLET (0.1 MG) BY MOUTH 3 TIMES A DAY. 270 tablet 0 3/18/2025 Morning    escitalopram (Lexapro) 20 mg tablet Take 1 tablet (20 mg) by mouth once daily in the morning.   3/18/2025 Morning    ezetimibe (Zetia) 10 mg tablet TAKE 1 TABLET BY MOUTH EVERY DAY 90 tablet 0 3/17/2025 Bedtime    ferrous sulfate 324 mg (65 mg elemental iron) EC tablet (delayed release) Take 1 tablet (65 mg) by mouth once daily. 90 tablet 0 3/17/2025 Bedtime    gabapentin (Neurontin) 400 mg capsule Take 1 capsule (400 mg) by mouth 3 times a day. 90 capsule 0 3/18/2025 Morning    losartan (Cozaar) 50 mg tablet Take 1 tablet (50 mg) by mouth once daily. 90 tablet 0 3/17/2025 Noon    metoprolol tartrate (Lopressor) 50 mg tablet TAKE 1 TABLET BY MOUTH TWICE A  tablet 0 3/18/2025 Morning    naltrexone (Depade) 50 mg tablet Take 1 tablet (50 mg) by mouth once daily. 90 tablet 0 3/18/2025 Morning    solifenacin (Vesicare) 10 mg tablet Take 1 tablet (10 mg) by mouth once daily. Swallow tablet whole; do not crush, chew, or split. 90 tablet 1 3/18/2025 Morning    mirtazapine (Remeron) 45 mg tablet Take 1 tablet (45 mg) by mouth once daily at bedtime. 90 tablet 0 3/16/2025 Bedtime    rosuvastatin (Crestor) 40 mg tablet Take 1 tablet (40 mg) by mouth once daily. (Patient not taking: Reported on 3/18/2025) 90 tablet 0 Not Taking     Patient has no known allergies.  Social History     Tobacco Use    Smoking status: Never    Smokeless tobacco: Never   Substance Use Topics     Alcohol use: Never     Family History   Family history unknown: Yes       Hospital Medications:             Current Facility-Administered Medications:     acetaminophen (Tylenol) tablet 650 mg, 650 mg, oral, q4h PRN **OR** acetaminophen (Tylenol) oral liquid 650 mg, 650 mg, oral, q4h PRN **OR** acetaminophen (Tylenol) suppository 650 mg, 650 mg, rectal, q4h PRN, Linette Kennedy PA-C    [Held by provider] amiodarone (Pacerone) tablet 200 mg, 200 mg, oral, Daily, Debra Alcantar MD, 200 mg at 03/19/25 0809    amLODIPine (Norvasc) tablet 2.5 mg, 2.5 mg, oral, Daily, Caesy Weiner DO, 2.5 mg at 03/20/25 0910    apixaban (Eliquis) tablet 5 mg, 5 mg, oral, q12h, Linette Kennedy PA-C, 5 mg at 03/19/25 2109    aspirin chewable tablet 81 mg, 81 mg, oral, Nightly, Linette Kennedy PA-C, 81 mg at 03/19/25 2109    [Held by provider] cloNIDine (Catapres) tablet 0.1 mg, 0.1 mg, oral, TID, Sarah Mullen PA-C, 0.1 mg at 03/19/25 0809    docusate sodium (Colace) capsule 100 mg, 100 mg, oral, BID, Linette Kennedy PA-C, 100 mg at 03/20/25 0910    escitalopram (Lexapro) tablet 20 mg, 20 mg, oral, Daily, Linette Kennedy PA-C, 20 mg at 03/20/25 0911    ezetimibe (Zetia) tablet 10 mg, 10 mg, oral, Daily, Linette Kennedy PA-C, 10 mg at 03/20/25 0911    ferrous sulfate tablet 325 mg, 1 tablet, oral, Daily, Linette Kennedy PA-C, 325 mg at 03/20/25 0911    melatonin tablet 3 mg, 3 mg, oral, Nightly PRN, Linette Kennedy PA-C    mirtazapine (Remeron) tablet 45 mg, 45 mg, oral, Nightly, Linette Kennedy PA-C, 45 mg at 03/19/25 2109    ondansetron (Zofran) tablet 4 mg, 4 mg, oral, q8h PRN **OR** ondansetron (Zofran) injection 4 mg, 4 mg, intravenous, q8h PRN, Linette Kennedy PA-C    oxygen (O2) therapy, , inhalation, Continuous - Inhalation, Linette Kennedy PA-C, 21 percent at 03/20/25 0707    polyethylene glycol (Glycolax, Miralax) packet 17 g, 17 g, oral, Daily, Linette Kennedy PA-C    rosuvastatin (Crestor) tablet 40 mg, 40 mg,  "oral, Daily, Linette Kennedy PA-C, 40 mg at 03/20/25 0911    Review of Systems:  14 point review of systems was completed and negative except for those specially mention in my HPI    Physical Exam:    Heart Rate:  [43-51]   Temp:  [36.5 °C (97.7 °F)-36.9 °C (98.4 °F)]   Resp:  [12-18]   BP: ()/()   Height:  [172.7 cm (5' 8\")]   Weight:  [114 kg (252 lb 3.3 oz)]   SpO2:  [94 %-99 %]     Physical Exam  Constitutional:       Appearance: Normal appearance.   HENT:      Head: Normocephalic and atraumatic.      Nose: Nose normal.      Mouth/Throat:      Mouth: Mucous membranes are moist.      Pharynx: Oropharynx is clear.   Eyes:      Conjunctiva/sclera: Conjunctivae normal.      Pupils: Pupils are equal, round, and reactive to light.   Cardiovascular:      Rate and Rhythm: Bradycardia present.      Pulses: Normal pulses.      Heart sounds: Normal heart sounds.   Pulmonary:      Effort: Pulmonary effort is normal.      Breath sounds: Normal breath sounds.   Abdominal:      General: Abdomen is flat. Bowel sounds are normal.      Palpations: Abdomen is soft.   Musculoskeletal:         General: Normal range of motion.   Skin:     General: Skin is warm and dry.      Capillary Refill: Capillary refill takes less than 2 seconds.   Neurological:      General: No focal deficit present.      Mental Status: He is alert and oriented to person, place, and time.   Psychiatric:         Mood and Affect: Mood normal.         Behavior: Behavior normal.       Objective:    I have reviewed all medications, laboratory results, and imaging pertinent for today's encounter.    FiO2 (%):  [21 %] 21 %      Intake/Output Summary (Last 24 hours) at 3/20/2025 0912  Last data filed at 3/20/2025 0300  Gross per 24 hour   Intake 2140.01 ml   Output 1225 ml   Net 915.01 ml       Recent Imaging  US renal complete   Final Result   Normal ultrasound of the kidneys.        MACRO:   None.        Signed by: Rachael Leung 3/19/2025 8:09 AM "   Dictation workstation:   [id number]      CT head wo IV contrast   Final Result   No CT evidence of acute intracranial injury.                  MACRO:   None             Signed by: Ruel Avendano 3/18/2025 5:09 PM   Dictation workstation:   [id number]      CT cervical spine wo IV contrast   Final Result   No evidence for an acute fracture or subluxation of the cervical   spine.        MACRO:   None        Signed by: Ruel Avendano 3/18/2025 5:10 PM   Dictation workstation:   [id number]      XR chest 1 view   Final Result   No evidence of acute cardiopulmonary process.        Signed by: Charla Delaney 3/18/2025 3:49 PM   Dictation workstation:   [id number]      Transthoracic Echo (TTE) Complete    (Results Pending)       No echocardiogram results found for the past 14 days    Assessment/Plan:    I am currently managing this critically ill patient for the following problems:    Neuro/Psych/Pain Ctrl/Sedation:  #S/p fall  #Hx alcohol use  CT head 3/18: no CT evidence of acute intracranial injury   CT cervical spine 3/18: no evidence of acute fracture or subluxation of the cervical spine  - Continue home Remeron, Lexapro   - CAM ICU, delirium precaution    Respiratory/ENT:  #Hx MATHEUS  - Room air during the day, CPAP at night  - Pulmonary hygiene    Cardiovascular:  #Symptomatic bradycardia - junctional  #Hypotension 2/2 medication  #Hx paroxysmal Afib -takes eliquis at home  #Hx HTN  Echo 3/20: EF 60-65%, normal right ventricular global systolic function, mild left atrial dilation  - Continue home ASA, zetia, statin  - Restart Norvasc at lower dose  - Hold home antihypertensives (clonidine, metoprolol, Norvasc) d/t hypotension  - Hold amio d/t bradycardia  - EP consulted  - EKGs as needed    GI:  No acute concerns  - Regular diet  - Colace and Miralax for bowel reg    Renal/Volume Status (Intra & Extravascular):  #DIA - pre-renal vs medication (takes ARB at home, discontinued), improving  - Crt 1.84, baseline  1.40  - Real U/s 3/19: normal  - Replace electrolytes as indicated     Endocrine  No acute concerns  - TSH 1.77    Infectious Disease:  No acute concerns  - WBCs 5.4  - Monitor for SIRS    Heme/Onc:  No acute concerns  - Continue home iron  - Continue eliquis   - Hgb 11.8  - Transfuse for Hgb < 7.0    OBGYN/MSK:  - Ambulatory at baseline    Ethics/Code Status:  Full code, patient has capacity    :  DVT Prophylaxis: Eliquis, SCDs  GI Prophylaxis: NA  Bowel Regimen: Colace, miralax  Diet: Regular   CVC: NA  Ghada: NA  Du: NA  Restraints: NA  Dispo: Downgrade. Signed out to Dr. Alcantar at 12 pm    Plan discussed with Dr. Cuadra    Critical Care Time:  40 minutes spent in preparing to see patient (I.e. review of medical records), evaluation of diagnostics (I.e. labs, imaging, etc.), documentation, discussing plan of care with patient/ family/ caregiver, and/ or coordination of care with multidisciplinary team. Time does not include completion of procedure time.      Linette Kennedy PA-C

## 2025-03-20 NOTE — PROGRESS NOTES
Patient not medically clear. Patient transferred to ICU and now will be transferred back to the floor. TCC met with patient to discuss discharge plans. Patient declining HHC at this time. Patient will return home. Will follow.      03/20/25 8268   Discharge Planning   Home or Post Acute Services None   Expected Discharge Disposition Home   Does the patient need discharge transport arranged? No

## 2025-03-20 NOTE — PROGRESS NOTES
Occupational Therapy    Evaluation    Patient Name: Dilshad Freeman  MRN: [id number]  Department: [facility] 3 N ICU  Room: 08/08-A  Today's Date: 3/20/2025  Time Calculation  Start Time: 0941  Stop Time: 0955  Time Calculation (min): 14 min    Assessment  IP OT Assessment  OT Assessment: OT order received, chart reviewed, evaluation complete. Pt completed ADLs independently but required increased time to complete as pt appeared to have tremulous activity of B hands affecting his coordination. Pt provided with CGA during functional mobility as pt would occassionally reach for furniture for support for stability and recommended to utilize cane during all functional mobility for safety.  Pt reports he is moving around at his normal pace. Pt appears to be at baseline functioning. No further acute OT needs.  Prognosis: Fair  Barriers to Discharge Home: Caregiver assistance  Caregiver Assistance: Patient lives alone and/or does not have reliable caregiver assistance  Evaluation/Treatment Tolerance: Patient tolerated treatment well  Medical Staff Made Aware: Yes  End of Session Communication: Bedside nurse  End of Session Patient Position: Up in chair, Alarm off, not on at start of session (call light in reach, pt agreeable to notify nursing for needs)  Plan:  No Skilled OT: At baseline function  OT Frequency: OT eval only  OT Discharge Recommendations: No further acute OT  OT Recommended Transfer Status: Independent  OT - OK to Discharge: Yes    Subjective   Current Problem:  1. Paroxysmal atrial fibrillation (Multi)  Transthoracic Echo (TTE) Complete    Transthoracic Echo (TTE) Complete      2. Symptomatic bradycardia        3. DIA (acute kidney injury) (CMS-HCC)        4. Dehydration        5. Hypertension, unspecified type  Transthoracic Echo (TTE) Complete    Transthoracic Echo (TTE) Complete      6. Bradycardia          General:  General  Reason for Referral: Activities of daily living  Past Medical History Relevant to  Rehab: ANX, CAD, DIA, GERD, OA, HLD, HTN, Heart Failure,  Family/Caregiver Present: No  Co-Treatment: PT  Co-Treatment Reason: maximization of pt safety and functional mobility  Prior to Session Communication: Bedside nurse  Patient Position Received: Up in chair, Alarm on  General Comment: Pt is a 64 year old male admit with symptomatic bradycardia. Per chart review, pt experienced a fall as a result of his legs giving out.  Precautions:  Hearing/Visual Limitations: glasses all the time, hearing WFL  Medical Precautions: Fall precautions      Vital Signs Comment: PRE: HR 48, 13 RR, 167/88 (111)    Pain:  Pain Assessment  Pain Assessment: 0-10  0-10 (Numeric) Pain Score: 0 - No pain    Objective   Cognition:  Overall Cognitive Status: Within Functional Limits  Orientation Level: Oriented X4  Processing Speed: Within funtional limits           Home Living:  Type of Home: Apartment  Lives With: Alone  Home Adaptive Equipment: Cane  Home Layout: One level (laundry next door to apartment unit)  Home Access: Level entry (pt lives on first floor of apartment)  Bathroom Shower/Tub: Tub/shower unit  Bathroom Toilet: Standard  Bathroom Equipment: Grab bars in shower, Shower chair without back   Prior Function:  Level of [place]: Independent with ADLs and functional transfers, Independent with homemaking with ambulation  Receives Help From: Family  ADL Assistance: Independent  Homemaking Assistance: Independent  Ambulatory Assistance: Independent (modified independent with cane)  Vocational: Retired  Prior Function Comments: still drives, pt reports his sister lives local and able to assist if needed  IADL History:     ADL:  Eating Assistance: Independent  Eating Deficit:  (pt observed eating breakfast upon completion of therapy session. pt did not require assistance with opening of lids and containers)  Grooming Assistance: Independent  Grooming Deficit:  (pt completed oral and face hygiene independently standing  sinkside during therapy session.)  Bathing Assistance: Independent  UE Dressing Assistance: Independent  UE Dressing Deficit:  (Pt UE ROM is WFL and not projected to impact UE dressing)  LE Dressing Assistance: Independent  LE Dressing Deficit:  (Pt donned pants independently during therapy session. Pt threaded LE through pants while seated in chair and stood up independently to pull clothing over hips.)  Toileting Assistance with Device: Independent  Activity Tolerance:  Endurance: Tolerates 10 - 20 min exercise with multiple rests  Activity Tolerance Comments: pt tolerated activity well  Rate of Perceived Exertion (RPE): 4/10  Bed Mobility/Transfers: Bed Mobility  Bed Mobility: No    Transfers  Transfer: Yes  Transfer 1  Transfer From 1: Chair with arms to, Stand to  Transfer to 1: Stand, Chair with arms  Technique 1: Sit to stand, Stand to sit  Transfer Level of Assistance 1: Contact guard  Trials/Comments 1: Pt demonstrated appropriate hand placement to assist with safe transfer. Pt provided with CGA for safety.      Functional Mobility:  Functional Mobility  Functional Mobility Performed: Yes  Functional Mobility 1  Surface 1: Level tile  Device 1: No device  Assistance 1: Contact guard  Comments 1: Pt performed short household distance functional mobility to and from chair to sink. Pt denied the need to use a cane as he stated he only uses a cane for long distances. Pt would occassionally reach for furniture during functional mobility and appeared slightly unsteady. Pt provided with CGA assist for safety and denied feelings of SOB or dizziness upon completion of functional mobility. Pt is recommended to utilize cane during all functional mobility despite distance.     Sensation:  Light Touch: Partial deficits in the RUE, Partial deficits in the LUE (pt reports tingling in hands)  Strength:  Strength Comments: KASANDRA 4/5     Coordination:  Movements are Fluid and Coordinated: No  Upper Body Coordination: Pt  appeared to have tremulous activities of B hands   Hand Function:  Hand Function  Gross Grasp: Functional  Coordination: Impaired  Extremities: RUE   RUE : Within Functional Limits and LUE   LUE: Within Functional Limits    Outcome Measures: [facility] Daily Activity  Putting on and taking off regular lower body clothing: None  Bathing (including washing, rinsing, drying): None  Putting on and taking off regular upper body clothing: None  Toileting, which includes using toilet, bedpan or urinal: None  Taking care of personal grooming such as brushing teeth: None  Eating Meals: None  Daily Activity - Total Score: 24      Education Documentation  ADL Training, taught by KIAN Mendoza at 3/20/2025 11:15 AM.  Learner: Patient  Readiness: Acceptance  Method: Explanation  Response: Verbalizes Understanding  Comment: Pt educated on call light use and plan for therapy.    Education Comments  No comments found.      Goals:

## 2025-03-20 NOTE — NURSING NOTE
Assumed care of pt. Pt transferred to 461 from ICU. Pt alert X3. Denies pain. NAD. RA. Pt has noticeable tremor. Pt sitting upright in his bedside chair. Dinner ordered. Water pitcher provided. Call light and belongings within reach. Pt placed on tele. PCA in the room obtaining vitals. Oriented to room and equipment. Continuing to monitor

## 2025-03-20 NOTE — CARE PLAN
Problem: Pain - Adult  Goal: Verbalizes/displays adequate comfort level or baseline comfort level  Outcome: Progressing  Flowsheets (Taken 3/19/2025 2116)  Verbalizes/displays adequate comfort level or baseline comfort level:   Encourage patient to monitor pain and request assistance   Assess pain using appropriate pain scale   Administer analgesics based on type and severity of pain and evaluate response   Implement non-pharmacological measures as appropriate and evaluate response   Consider cultural and social influences on pain and pain management   Notify Licensed Independent Practitioner if interventions unsuccessful or patient reports new pain     Problem: Safety - Adult  Goal: Free from fall injury  Outcome: Progressing  Flowsheets (Taken 3/19/2025 2116)  Free from fall injury:   Instruct family/caregiver on patient safety   Based on caregiver fall risk screen, instruct family/caregiver to ask for assistance with transferring infant if caregiver noted to have fall risk factors     Problem: Discharge Planning  Goal: Discharge to home or other facility with appropriate resources  Outcome: Progressing  Flowsheets (Taken 3/19/2025 2116)  Discharge to home or other facility with appropriate resources:   Identify barriers to discharge with patient and caregiver   Arrange for needed discharge resources and transportation as appropriate   Identify discharge learning needs (meds, wound care, etc)   Arrange for interpreters to assist at discharge as needed   Refer to discharge planning if patient needs post-hospital services based on physician order or complex needs related to functional status, cognitive ability or social support system     Problem: Chronic Conditions and Co-morbidities  Goal: Patient's chronic conditions and co-morbidity symptoms are monitored and maintained or improved  Outcome: Progressing  Flowsheets (Taken 3/19/2025 2116)  Care Plan - Patient's Chronic Conditions and Co-Morbidity Symptoms are  Monitored and Maintained or Improved:   Monitor and assess patient's chronic conditions and comorbid symptoms for stability, deterioration, or improvement   Collaborate with multidisciplinary team to address chronic and comorbid conditions and prevent exacerbation or deterioration   Update acute care plan with appropriate goals if chronic or comorbid symptoms are exacerbated and prevent overall improvement and discharge     Problem: Nutrition  Goal: Nutrient intake appropriate for maintaining nutritional needs  Outcome: Progressing     Problem: Pain  Goal: Takes deep breaths with improved pain control throughout the shift  Outcome: Progressing  Goal: Turns in bed with improved pain control throughout the shift  Outcome: Progressing  Goal: Walks with improved pain control throughout the shift  Outcome: Progressing  Goal: Performs ADL's with improved pain control throughout shift  Outcome: Progressing  Goal: Participates in PT with improved pain control throughout the shift  Outcome: Progressing  Goal: Free from opioid side effects throughout the shift  Outcome: Progressing  Goal: Free from acute confusion related to pain meds throughout the shift  Outcome: Progressing   The patient's goals for the shift include rest    The clinical goals for the shift include patient will remain hemodynamically stable    Over the shift, the patient did not make progress toward the following goals. Barriers to progression include . Recommendations to address these barriers include .

## 2025-03-21 LAB
ALBUMIN SERPL BCP-MCNC: 3.6 G/DL (ref 3.4–5)
ALP SERPL-CCNC: 78 U/L (ref 33–136)
ALT SERPL W P-5'-P-CCNC: 32 U/L (ref 10–52)
ANION GAP SERPL CALCULATED.3IONS-SCNC: 10 MMOL/L (ref 10–20)
AST SERPL W P-5'-P-CCNC: 29 U/L (ref 9–39)
BASOPHILS # BLD AUTO: 0.03 X10*3/UL (ref 0–0.1)
BASOPHILS NFR BLD AUTO: 0.5 %
BILIRUB SERPL-MCNC: 0.5 MG/DL (ref 0–1.2)
BUN SERPL-MCNC: 21 MG/DL (ref 6–23)
CALCIUM SERPL-MCNC: 8.9 MG/DL (ref 8.6–10.3)
CHLORIDE SERPL-SCNC: 109 MMOL/L (ref 98–107)
CO2 SERPL-SCNC: 25 MMOL/L (ref 21–32)
CREAT SERPL-MCNC: 1.5 MG/DL (ref 0.5–1.3)
EGFRCR SERPLBLD CKD-EPI 2021: 51 ML/MIN/1.73M*2
EOSINOPHIL # BLD AUTO: 0.24 X10*3/UL (ref 0–0.7)
EOSINOPHIL NFR BLD AUTO: 4.3 %
ERYTHROCYTE [DISTWIDTH] IN BLOOD BY AUTOMATED COUNT: 13.1 % (ref 11.5–14.5)
GLUCOSE SERPL-MCNC: 107 MG/DL (ref 74–99)
HCT VFR BLD AUTO: 40.2 % (ref 41–52)
HGB BLD-MCNC: 13 G/DL (ref 13.5–17.5)
IMM GRANULOCYTES # BLD AUTO: 0.02 X10*3/UL (ref 0–0.7)
IMM GRANULOCYTES NFR BLD AUTO: 0.4 % (ref 0–0.9)
LYMPHOCYTES # BLD AUTO: 1.18 X10*3/UL (ref 1.2–4.8)
LYMPHOCYTES NFR BLD AUTO: 20.9 %
MAGNESIUM SERPL-MCNC: 1.62 MG/DL (ref 1.6–2.4)
MCH RBC QN AUTO: 30.2 PG (ref 26–34)
MCHC RBC AUTO-ENTMCNC: 32.3 G/DL (ref 32–36)
MCV RBC AUTO: 93 FL (ref 80–100)
MONOCYTES # BLD AUTO: 0.46 X10*3/UL (ref 0.1–1)
MONOCYTES NFR BLD AUTO: 8.2 %
NEUTROPHILS # BLD AUTO: 3.71 X10*3/UL (ref 1.2–7.7)
NEUTROPHILS NFR BLD AUTO: 65.7 %
NRBC BLD-RTO: 0 /100 WBCS (ref 0–0)
PHOSPHATE SERPL-MCNC: 2.9 MG/DL (ref 2.5–4.9)
PLATELET # BLD AUTO: 228 X10*3/UL (ref 150–450)
POTASSIUM SERPL-SCNC: 4.2 MMOL/L (ref 3.5–5.3)
PROT SERPL-MCNC: 6.3 G/DL (ref 6.4–8.2)
RBC # BLD AUTO: 4.31 X10*6/UL (ref 4.5–5.9)
SODIUM SERPL-SCNC: 140 MMOL/L (ref 136–145)
WBC # BLD AUTO: 5.6 X10*3/UL (ref 4.4–11.3)

## 2025-03-21 PROCEDURE — 85025 COMPLETE CBC W/AUTO DIFF WBC: CPT

## 2025-03-21 PROCEDURE — 2500000001 HC RX 250 WO HCPCS SELF ADMINISTERED DRUGS (ALT 637 FOR MEDICARE OP)

## 2025-03-21 PROCEDURE — 83735 ASSAY OF MAGNESIUM: CPT

## 2025-03-21 PROCEDURE — 99233 SBSQ HOSP IP/OBS HIGH 50: CPT | Performed by: INTERNAL MEDICINE

## 2025-03-21 PROCEDURE — 99232 SBSQ HOSP IP/OBS MODERATE 35: CPT | Performed by: PHYSICIAN ASSISTANT

## 2025-03-21 PROCEDURE — 2500000004 HC RX 250 GENERAL PHARMACY W/ HCPCS (ALT 636 FOR OP/ED)

## 2025-03-21 PROCEDURE — 2500000002 HC RX 250 W HCPCS SELF ADMINISTERED DRUGS (ALT 637 FOR MEDICARE OP, ALT 636 FOR OP/ED)

## 2025-03-21 PROCEDURE — 36415 COLL VENOUS BLD VENIPUNCTURE: CPT

## 2025-03-21 PROCEDURE — 2500000001 HC RX 250 WO HCPCS SELF ADMINISTERED DRUGS (ALT 637 FOR MEDICARE OP): Performed by: INTERNAL MEDICINE

## 2025-03-21 PROCEDURE — 84100 ASSAY OF PHOSPHORUS: CPT

## 2025-03-21 PROCEDURE — 97112 NEUROMUSCULAR REEDUCATION: CPT | Mod: GP,CQ

## 2025-03-21 PROCEDURE — 97116 GAIT TRAINING THERAPY: CPT | Mod: GP,CQ

## 2025-03-21 PROCEDURE — 84075 ASSAY ALKALINE PHOSPHATASE: CPT

## 2025-03-21 PROCEDURE — 1200000002 HC GENERAL ROOM WITH TELEMETRY DAILY

## 2025-03-21 PROCEDURE — 2500000001 HC RX 250 WO HCPCS SELF ADMINISTERED DRUGS (ALT 637 FOR MEDICARE OP): Performed by: PHYSICIAN ASSISTANT

## 2025-03-21 PROCEDURE — 99233 SBSQ HOSP IP/OBS HIGH 50: CPT | Performed by: STUDENT IN AN ORGANIZED HEALTH CARE EDUCATION/TRAINING PROGRAM

## 2025-03-21 RX ORDER — GABAPENTIN 400 MG/1
400 CAPSULE ORAL 2 TIMES DAILY
Status: DISCONTINUED | OUTPATIENT
Start: 2025-03-21 | End: 2025-03-24 | Stop reason: HOSPADM

## 2025-03-21 RX ORDER — AMLODIPINE BESYLATE 2.5 MG/1
2.5 TABLET ORAL DAILY
Status: COMPLETED | OUTPATIENT
Start: 2025-03-21 | End: 2025-03-21

## 2025-03-21 RX ORDER — CALCIUM CARBONATE 200(500)MG
500 TABLET,CHEWABLE ORAL 4 TIMES DAILY PRN
Status: DISCONTINUED | OUTPATIENT
Start: 2025-03-21 | End: 2025-03-24 | Stop reason: HOSPADM

## 2025-03-21 RX ORDER — AMLODIPINE BESYLATE 5 MG/1
5 TABLET ORAL DAILY
Status: DISCONTINUED | OUTPATIENT
Start: 2025-03-22 | End: 2025-03-22

## 2025-03-21 RX ORDER — METOPROLOL TARTRATE 25 MG/1
12.5 TABLET, FILM COATED ORAL 2 TIMES DAILY
Status: DISCONTINUED | OUTPATIENT
Start: 2025-03-21 | End: 2025-03-22

## 2025-03-21 RX ORDER — GABAPENTIN 400 MG/1
400 CAPSULE ORAL 3 TIMES DAILY
Status: DISCONTINUED | OUTPATIENT
Start: 2025-03-21 | End: 2025-03-21

## 2025-03-21 RX ADMIN — ROSUVASTATIN 40 MG: 20 TABLET, FILM COATED ORAL at 09:01

## 2025-03-21 RX ADMIN — GABAPENTIN 400 MG: 400 CAPSULE ORAL at 15:27

## 2025-03-21 RX ADMIN — CALCIUM CARBONATE 500 MG: 500 TABLET, CHEWABLE ORAL at 10:13

## 2025-03-21 RX ADMIN — AMLODIPINE BESYLATE 2.5 MG: 2.5 TABLET ORAL at 09:01

## 2025-03-21 RX ADMIN — ACETAMINOPHEN 650 MG: 325 TABLET, FILM COATED ORAL at 09:14

## 2025-03-21 RX ADMIN — EZETIMIBE 10 MG: 10 TABLET ORAL at 09:01

## 2025-03-21 RX ADMIN — DOCUSATE SODIUM 100 MG: 100 CAPSULE, LIQUID FILLED ORAL at 09:01

## 2025-03-21 RX ADMIN — POLYETHYLENE GLYCOL 3350 17 G: 17 POWDER, FOR SOLUTION ORAL at 09:01

## 2025-03-21 RX ADMIN — MIRTAZAPINE 45 MG: 15 TABLET, FILM COATED ORAL at 20:29

## 2025-03-21 RX ADMIN — AMLODIPINE BESYLATE 2.5 MG: 2.5 TABLET ORAL at 15:27

## 2025-03-21 RX ADMIN — ASPIRIN 81 MG: 81 TABLET, CHEWABLE ORAL at 20:29

## 2025-03-21 RX ADMIN — APIXABAN 5 MG: 5 TABLET, FILM COATED ORAL at 10:13

## 2025-03-21 RX ADMIN — APIXABAN 5 MG: 5 TABLET, FILM COATED ORAL at 22:22

## 2025-03-21 RX ADMIN — ESCITALOPRAM OXALATE 20 MG: 20 TABLET ORAL at 09:01

## 2025-03-21 RX ADMIN — DOCUSATE SODIUM 100 MG: 100 CAPSULE, LIQUID FILLED ORAL at 20:29

## 2025-03-21 RX ADMIN — METOPROLOL TARTRATE 12.5 MG: 25 TABLET, FILM COATED ORAL at 15:27

## 2025-03-21 RX ADMIN — GABAPENTIN 400 MG: 400 CAPSULE ORAL at 20:29

## 2025-03-21 RX ADMIN — FERROUS SULFATE TAB 325 MG (65 MG ELEMENTAL FE) 325 MG: 325 (65 FE) TAB at 09:01

## 2025-03-21 RX ADMIN — ONDANSETRON 4 MG: 2 INJECTION, SOLUTION INTRAMUSCULAR; INTRAVENOUS at 04:45

## 2025-03-21 ASSESSMENT — COGNITIVE AND FUNCTIONAL STATUS - GENERAL
MOBILITY SCORE: 20
STANDING UP FROM CHAIR USING ARMS: A LITTLE
CLIMB 3 TO 5 STEPS WITH RAILING: A LITTLE
WALKING IN HOSPITAL ROOM: A LITTLE
STANDING UP FROM CHAIR USING ARMS: A LITTLE
WALKING IN HOSPITAL ROOM: A LITTLE
MOBILITY SCORE: 20
CLIMB 3 TO 5 STEPS WITH RAILING: A LITTLE
MOVING TO AND FROM BED TO CHAIR: A LITTLE
WALKING IN HOSPITAL ROOM: A LITTLE
STANDING UP FROM CHAIR USING ARMS: A LITTLE
CLIMB 3 TO 5 STEPS WITH RAILING: A LITTLE
MOVING TO AND FROM BED TO CHAIR: A LITTLE
MOVING TO AND FROM BED TO CHAIR: A LITTLE
DAILY ACTIVITIY SCORE: 24
DAILY ACTIVITIY SCORE: 24
MOBILITY SCORE: 20

## 2025-03-21 ASSESSMENT — PAIN DESCRIPTION - LOCATION: LOCATION: HEAD

## 2025-03-21 ASSESSMENT — PAIN SCALES - GENERAL
PAINLEVEL_OUTOF10: 0 - NO PAIN
PAINLEVEL_OUTOF10: 4
PAINLEVEL_OUTOF10: 0 - NO PAIN

## 2025-03-21 ASSESSMENT — PAIN - FUNCTIONAL ASSESSMENT: PAIN_FUNCTIONAL_ASSESSMENT: 0-10

## 2025-03-21 NOTE — PROGRESS NOTES
Dilshad Freeman is a 64 y.o. male on day 3 of admission presenting with Symptomatic bradycardia.    Subjective   Patient is doing fine.  Denies dizziness.  He was transferred to ICU for low blood pressure and is out of the ICU.  Last night his blood pressure went high  He is having tremors.  He denies having any alcohol consumption in the last 100 days       Objective     Physical Exam  Vitals reviewed.   Constitutional:       Appearance: Normal appearance. He is obese.   HENT:      Head: Normocephalic and atraumatic.      Right Ear: Tympanic membrane, ear canal and external ear normal.      Left Ear: Tympanic membrane, ear canal and external ear normal.      Nose: Nose normal.      Mouth/Throat:      Pharynx: Oropharynx is clear.   Eyes:      Extraocular Movements: Extraocular movements intact.      Conjunctiva/sclera: Conjunctivae normal.      Pupils: Pupils are equal, round, and reactive to light.   Cardiovascular:      Rate and Rhythm: Normal rate and regular rhythm.      Pulses: Normal pulses.      Heart sounds: Normal heart sounds.   Pulmonary:      Effort: Pulmonary effort is normal.      Breath sounds: Normal breath sounds.   Abdominal:      General: Abdomen is flat. Bowel sounds are normal.      Palpations: Abdomen is soft.   Musculoskeletal:      Cervical back: Normal range of motion and neck supple.   Skin:     General: Skin is warm and dry.   Neurological:      General: No focal deficit present.      Mental Status: He is alert and oriented to person, place, and time.   Psychiatric:         Mood and Affect: Mood normal.           Intake/Output last 3 Shifts:  I/O last 3 completed shifts:  In: 1043.7 (9 mL/kg) [P.O.:222; I.V.:321.7 (2.8 mL/kg); IV Piggyback:500]  Out: 775 (6.7 mL/kg) [Urine:775 (0.2 mL/kg/hr)]  Weight: 116.3 kg     Relevant Results               Scheduled medications  [Held by provider] amiodarone, 200 mg, oral, Daily  amLODIPine, 2.5 mg, oral, Daily  apixaban, 5 mg, oral, q12h  aspirin,  81 mg, oral, Nightly  [Held by provider] cloNIDine, 0.1 mg, oral, TID  docusate sodium, 100 mg, oral, BID  escitalopram, 20 mg, oral, Daily  ezetimibe, 10 mg, oral, Daily  ferrous sulfate, 1 tablet, oral, Daily  mirtazapine, 45 mg, oral, Nightly  oxygen, , inhalation, Nightly  polyethylene glycol, 17 g, oral, Daily  rosuvastatin, 40 mg, oral, Daily      Continuous medications       PRN medications  PRN medications: acetaminophen **OR** acetaminophen **OR** acetaminophen, calcium carbonate, melatonin, ondansetron **OR** ondansetron  Results for orders placed or performed during the hospital encounter of 03/18/25 (from the past 24 hours)   CBC and Auto Differential   Result Value Ref Range    WBC 5.6 4.4 - 11.3 x10*3/uL    nRBC 0.0 0.0 - 0.0 /100 WBCs    RBC 4.31 (L) 4.50 - 5.90 x10*6/uL    Hemoglobin 13.0 (L) 13.5 - 17.5 g/dL    Hematocrit 40.2 (L) 41.0 - 52.0 %    MCV 93 80 - 100 fL    MCH 30.2 26.0 - 34.0 pg    MCHC 32.3 32.0 - 36.0 g/dL    RDW 13.1 11.5 - 14.5 %    Platelets 228 150 - 450 x10*3/uL    Neutrophils % 65.7 40.0 - 80.0 %    Immature Granulocytes %, Automated 0.4 0.0 - 0.9 %    Lymphocytes % 20.9 13.0 - 44.0 %    Monocytes % 8.2 2.0 - 10.0 %    Eosinophils % 4.3 0.0 - 6.0 %    Basophils % 0.5 0.0 - 2.0 %    Neutrophils Absolute 3.71 1.20 - 7.70 x10*3/uL    Immature Granulocytes Absolute, Automated 0.02 0.00 - 0.70 x10*3/uL    Lymphocytes Absolute 1.18 (L) 1.20 - 4.80 x10*3/uL    Monocytes Absolute 0.46 0.10 - 1.00 x10*3/uL    Eosinophils Absolute 0.24 0.00 - 0.70 x10*3/uL    Basophils Absolute 0.03 0.00 - 0.10 x10*3/uL   Comprehensive Metabolic Panel   Result Value Ref Range    Glucose 107 (H) 74 - 99 mg/dL    Sodium 140 136 - 145 mmol/L    Potassium 4.2 3.5 - 5.3 mmol/L    Chloride 109 (H) 98 - 107 mmol/L    Bicarbonate 25 21 - 32 mmol/L    Anion Gap 10 10 - 20 mmol/L    Urea Nitrogen 21 6 - 23 mg/dL    Creatinine 1.50 (H) 0.50 - 1.30 mg/dL    eGFR 51 (L) >60 mL/min/1.73m*2    Calcium 8.9 8.6 - 10.3  mg/dL    Albumin 3.6 3.4 - 5.0 g/dL    Alkaline Phosphatase 78 33 - 136 U/L    Total Protein 6.3 (L) 6.4 - 8.2 g/dL    AST 29 9 - 39 U/L    Bilirubin, Total 0.5 0.0 - 1.2 mg/dL    ALT 32 10 - 52 U/L   Magnesium   Result Value Ref Range    Magnesium 1.62 1.60 - 2.40 mg/dL   Phosphorus   Result Value Ref Range    Phosphorus 2.9 2.5 - 4.9 mg/dL     *Note: Due to a large number of results and/or encounters for the requested time period, some results have not been displayed. A complete set of results can be found in Results Review.     Electrocardiogram, 12-lead PRN ACS symptoms    Result Date: 3/20/2025  Sinus bradycardia with 1st degree AV block Low voltage QRS No previous ECGs available Confirmed by Thomas Gale ([id number]) on 3/20/2025 11:22:51 PM    Transthoracic Echo (TTE) Complete    Result Date: 3/20/2025           [place] [address]            Phone [phone] TRANSTHORACIC ECHOCARDIOGRAM REPORT Patient Name:       MICHAEL GREENE      Adina Physician:    [id number] [facility]                                                                 Study Date:         3/20/2025            Ordering Provider:    [id number] JUDD MRN/PID:            [id number]             Fellow: Accession#:         [id number]         Nurse: Date of Birth/Age:  1960 / 64 years Sonographer:          Elly Miramontes RDCS Gender Assigned at  M                    Additional Staff: Birth: Height:             172.72 cm            Admit Date: Weight:             114.31 kg            Admission Status:     Inpatient -                                                                Routine BSA / BMI:          2.25 m2 / 38.32      Department Location:  [facility]                     kg/m2 Blood Pressure: 150 /95 mmHg Study Type:    TRANSTHORACIC ECHO (TTE) COMPLETE  Diagnosis/ICD: Paroxysmal atrial fibrillation-I48.0 Indication:    afib CPT Codes:     Echo Complete w Full [id number] Patient History: Diabetes:          Yes BMI:               Obese >30 Pertinent History: A-Fib, HTN, CAD, Cancer and Syncope. bmi 38, heart failure,                    etoh, anemia, oa, pain, prostate ca, rld, crf, fall, tremor,                    richard. Study Detail: The following Echo studies were performed: 2D, M-Mode, Doppler and               color flow. Technically challenging study due to body habitus and               prominent lung artifact. Definity used as a contrast agent for               endocardial border definition. Total contrast used for this               procedure was 3 mL via IV push.  PHYSICIAN INTERPRETATION: Left Ventricle: The left ventricular systolic function is normal, with a visually estimated ejection fraction of 60-65%. There are no regional wall motion abnormalities. The left ventricular cavity size is normal. There is normal septal and normal posterior left ventricular wall thickness. Left ventricular diastolic filling was not assessed. Left Atrium: The left atrial size is mildly dilated. Right Ventricle: The right ventricle is normal in size. There is normal right ventricular global systolic function. Right Atrium: The right atrial size is normal. Aortic Valve: The aortic valve is trileaflet. The aortic valve dimensionless index is 0.47. There is no evidence of aortic valve regurgitation. The peak instantaneous gradient of the aortic valve is 9 mmHg. The mean gradient of the aortic valve is 5 mmHg. Mitral Valve: The mitral valve is normal in structure. There is mild mitral valve regurgitation. Tricuspid Valve: The tricuspid valve is structurally normal. No evidence of tricuspid regurgitation. Pulmonic Valve: The pulmonic valve is not well visualized. There is trace to mild pulmonic valve regurgitation. Pericardium: No pericardial effusion noted. Aorta: The aortic  root is normal.  CONCLUSIONS:  1. The left ventricular systolic function is normal, with a visually estimated ejection fraction of 60-65%.  2. There is normal right ventricular global systolic function.  3. Mild left atrial dilation. QUANTITATIVE DATA SUMMARY:  2D MEASUREMENTS:             Normal Ranges: LAs:             5.10 cm     (2.7-4.0cm) IVSd:            0.86 cm     (0.6-1.1cm) LVPWd:           0.93 cm     (0.6-1.1cm) LVIDd:           6.53 cm     (3.9-5.9cm) LV Mass Index:   110.2 g/m2 LVEDV Index:     44.89 ml/m2  LEFT ATRIUM:                  Normal Ranges: LA Vol A4C:        52.9 ml    (22+/-6mL/m2) LA Vol A2C:        78.4 ml LA Vol BP:         64.6 ml LA Vol Index A4C:  23.5ml/m2 LA Vol Index A2C:  34.8 ml/m2 LA Vol Index BP:   28.7 ml/m2 LA Area A4C:       21.2 cm2 LA Area A2C:       25.9 cm2 LA Major Axis A4C: 7.2 cm LA Major Axis A2C: 7.3 cm LA Vol A4C:        49.1 ml LA Vol A2C:        71.1 ml LA Vol Index BSA:  26.7 ml/m2  LV SYSTOLIC FUNCTION:                      Normal Ranges: EF-A4C View:    68 % (>=55%) EF-A2C View:    57 % EF-Biplane:     64 % EF-Visual:      63 % LV EF Reported: 63 %  LV DIASTOLIC FUNCTION:           Normal Ranges: MV Peak E:             0.56 m/s  (0.7-1.2 m/s) MV Peak A:             0.73 m/s  (0.42-0.7 m/s) E/A Ratio:             0.77      (1.0-2.2) MV e'                  0.074 m/s (>8.0) MV lateral e'          0.09 m/s MV medial e'           0.06 m/s E/e' Ratio:            7.61      (<8.0) a'                     0.09 m/s  MITRAL VALVE:          Normal Ranges: MV DT:        490 msec (150-240msec)  AORTIC VALVE:                     Normal Ranges: AoV Vmax:                1.54 m/s (<=1.7m/s) AoV Peak P.5 mmHg (<20mmHg) AoV Mean P.0 mmHg (1.7-11.5mmHg) LVOT Max Greyson:            0.78 m/s (<=1.1m/s) AoV VTI:                 39.40 cm (18-25cm) LVOT VTI:                18.50 cm LVOT Diameter:           2.00 cm  (1.8-2.4cm) AoV Area, VTI:           1.48  cm2 (2.5-5.5cm2) AoV Area,Vmax:           1.58 cm2 (2.5-4.5cm2) AoV Dimensionless Index: 0.47  RIGHT VENTRICLE: TAPSE: 26.4 mm RV s'  0.14 m/s  TRICUSPID VALVE/RVSP:          Normal Ranges: Peak TR Velocity:     1.98 m/s RV Syst Pressure:     24 mmHg  (< 30mmHg) IVC Diam:             1.84 cm  PULMONIC VALVE:          Normal Ranges: PV Accel Time:  95 msec  (>120ms) PV Max Greyson:     0.9 m/s  (0.6-0.9m/s) PV Max PG:      3.3 mmHg  [id number] Casey Weiner DO Electronically signed on 3/20/2025 at 10:52:38 AM  ** Final **                   Assessment/Plan   Assessment & Plan  Symptomatic bradycardia    Fall    Coarse tremors    Anxiety    Carcinoma of prostate (Multi)    CAD (coronary artery disease)    Diabetes mellitus without complication (Multi)    Dyslipidemia    Hypercholesterolemia    Morbid obesity (Multi)    Paroxysmal atrial fibrillation (Multi)    Lumbar postlaminectomy syndrome    Obstructive sleep apnea, adult    Seizure disorder (Multi)    Bariatric surgery status    DIA (acute kidney injury) (CMS-HCC)    Acute kidney injury improving  Ultrasound kidney normal  Bradycardia improving off the beta-blocker and amiodarone  Dr. Kiran's input noted  No plans for pacemaker  Patient's blood pressure is higher  Cardiology is managing the blood pressure medications  Will restart Neurontin small dose which could be causing the withdrawal tremors         I spent  minutes in the professional and overall care of this patient.      Debra Alcantar MD

## 2025-03-21 NOTE — PROGRESS NOTES
"Dilshad Freeman is a 64 y.o. male on day 3 of admission presenting with Symptomatic bradycardia.    Subjective   Sitting comfortably.       Objective     Physical Exam  Constitutional:       Appearance: Normal appearance.   Cardiovascular:      Rate and Rhythm: Normal rate and regular rhythm.      Heart sounds: No murmur heard.     No friction rub. No gallop.   Pulmonary:      Effort: Pulmonary effort is normal.      Breath sounds: Normal breath sounds.   Abdominal:      Palpations: Abdomen is soft.   Musculoskeletal:      Cervical back: Neck supple.   Neurological:      Mental Status: He is alert.   Psychiatric:         Mood and Affect: Mood normal.         Behavior: Behavior normal.         Last Recorded Vitals  Blood pressure (!) 175/103, pulse 69, temperature 36.7 °C (98.1 °F), temperature source Oral, resp. rate 15, height 1.727 m (5' 8\"), weight 116 kg (256 lb 6.3 oz), SpO2 99%.  Intake/Output last 3 Shifts:  I/O last 3 completed shifts:  In: 1043.7 (9 mL/kg) [P.O.:222; I.V.:321.7 (2.8 mL/kg); IV Piggyback:500]  Out: 775 (6.7 mL/kg) [Urine:775 (0.2 mL/kg/hr)]  Weight: 116.3 kg     Relevant Results                 Echo (3/20/2025):    PHYSICIAN INTERPRETATION:  Left Ventricle: The left ventricular systolic function is normal, with a visually estimated ejection fraction of 60-65%. There are no regional wall motion abnormalities. The left ventricular cavity size is normal. There is normal septal and normal posterior left ventricular wall thickness. Left ventricular diastolic filling was not assessed.  Left Atrium: The left atrial size is mildly dilated.  Right Ventricle: The right ventricle is normal in size. There is normal right ventricular global systolic function.  Right Atrium: The right atrial size is normal.  Aortic Valve: The aortic valve is trileaflet. The aortic valve dimensionless index is 0.47. There is no evidence of aortic valve regurgitation. The peak instantaneous gradient of the aortic valve is 9 " mmHg. The mean gradient of the aortic valve is 5 mmHg.  Mitral Valve: The mitral valve is normal in structure. There is mild mitral valve regurgitation.  Tricuspid Valve: The tricuspid valve is structurally normal. No evidence of tricuspid regurgitation.  Pulmonic Valve: The pulmonic valve is not well visualized. There is trace to mild pulmonic valve regurgitation.  Pericardium: No pericardial effusion noted.  Aorta: The aortic root is normal.        CONCLUSIONS:   1. The left ventricular systolic function is normal, with a visually estimated ejection fraction of 60-65%.   2. There is normal right ventricular global systolic function.   3. Mild left atrial dilation.             Assessment/Plan   Assessment & Plan  Symptomatic bradycardia    Anxiety    Bariatric surgery status    Hypercholesterolemia    Lumbar postlaminectomy syndrome    Morbid obesity (Multi)    Obstructive sleep apnea, adult    Carcinoma of prostate (Multi)    Diabetes mellitus without complication (Multi)    Dyslipidemia    Seizure disorder (Multi)    Paroxysmal atrial fibrillation (Multi)    CAD (coronary artery disease)    Fall    Coarse tremors    DIA (acute kidney injury) (CMS-[facility])    3/20:  Bradycardia and paroxysmal atrial fibrillation  History as noted above.  I would continue to hold amiodarone especially in the setting as it is not ideal for someone his age to be taking long-term regardless of his bradycardia.  I think he would benefit from a low-dose of beta-blockers at 12.5 mg metoprolol twice a day for now without antiarrhythmic drugs on board.  Given the extended half-life of amiodarone it will take about a month until we no longer see its effects.  No indication for pacemaker at this time.    3/21:  Telemetry since yesterday revealing afib with ventricular rates in the 120s between around 10 pm last night and 4 am. Since then, patient has been in sinus rhythm with HR on the 70s. With his current rates and blood pressures, his  metoprolol may be slowly increased.             Miladis Bowden MD

## 2025-03-21 NOTE — SIGNIFICANT EVENT
03/21/25 0044   Provider Notification   Reason for Communication Abnormal vitals   Provider Name Dr. Weiner   Provider Role Consulting physician   Method of Communication Page   Details of Communication pt bp was 174/97 hr 64   Response Waiting for response   Notification Time 0040     Cardiology was consulted, Dr. Weiner said he is ok with the BP. We have to slowly increase his dosage, attending physician notified as well.

## 2025-03-21 NOTE — ED PROVIDER NOTES
HPI   Chief Complaint   Patient presents with    Dizziness     Pt states that he has been feeling dizzy and been having jerking motions. Pt states that his legs gave out and he fell. Did not hit head.        HPI  This is a 64-year-old male presenting to the Emergency Department for evaluation of lightheadedness causing a fall.  Patient states he was at home when he felt his legs gave out.  He felt lightheaded and dizzy beforehand.  Patient does not recall hitting his head.  Patient currently states he feels lightheaded.  Nuys any shortness of breath or chest pain.  No headache, neck pain, changes in his vision.  No abdominal pain, pain in his upper or lower extremities bilaterally or back pain.    Please see HPI for pertinent positive and negative ROS.       Patient History   Past Medical History:   Diagnosis Date    Anxiety     Atrial fibrillation (Multi)     Body mass index (BMI)40.0-44.9, adult 04/26/2021    BMI 40.0-44.9, adult    BPH (benign prostatic hyperplasia)     Depression     H/O knee surgery     High cholesterol     History of back surgery     HX: anticoagulation     Hypertension     Personal history of alcoholism (Multi)     Prostate cancer (Multi)     Stress      Past Surgical History:   Procedure Laterality Date    BACK SURGERY      OTHER SURGICAL HISTORY  07/01/2013    Reported Hx Of Hip Replacement - Left Side    OTHER SURGICAL HISTORY  07/01/2013    Reported Hx Of Hip Replacement - Right Side     Family History   Family history unknown: Yes     Social History     Tobacco Use    Smoking status: Never    Smokeless tobacco: Never   Substance Use Topics    Alcohol use: Never    Drug use: Not on file       Physical Exam   ED Triage Vitals   Temp Heart Rate Resp BP   03/18/25 1501 03/18/25 1501 03/18/25 1501 03/18/25 1501   36.6 °C (97.9 °F) (!) 40 18 95/55      SpO2 Temp Source Heart Rate Source Patient Position   03/18/25 1501 03/18/25 2101 03/18/25 1942 03/18/25 1501   98 % Oral Monitor Sitting       BP Location FiO2 (%)     03/18/25 1501 03/20/25 0011     Right arm 21 %       Physical Exam  GENERAL APPEARANCE: Awake and alert. No acute respiratory distress.    VITAL SIGNS: As per the nurses' triage record.  HEENT: Normocephalic, atraumatic. Extraocular muscles are intact. Conjunctiva are pink. Negative scleral icterus. Mucous membranes are moist.  NECK: Soft, nontender and supple  CHEST: Nontender to palpation. Clear to auscultation bilaterally. Symmetric rise and fall of chest wall.   HEART: Clear S1 and S2. Regular rate and rhythm. Strong and equal pulses in the extremities.  ABDOMEN: Soft, nontender, nondistended  MUSCULOSKELETAL: Full gross active range of motion. Ambulating on own with no acute difficulties  NEUROLOGICAL: Awake, alert and oriented x 3. Motor power intact in the upper and lower extremities. Sensation is intact to light touch in the upper and lower extremities. Patient answering questions appropriately.  Patient does have a tremor in his hands bilaterally  IMMUNOLOGICAL: No lymphatic streaking noted  DERMATOLOGIC: Warm and dry   PYSCH: Cooperative with appropriate mood and affect.    ED Course & MDM   ED Course as of 03/20/25 2128   Tue Mar 18, 2025   1519 EKG interpretation  Obtained 1505 reviewed 1515  Junctional bradycardia no acute ST elevation depression signs of acute ischemia rate 39  QTc 289 no bundle branch block nonspecific ST changes  Per my independent interpretation     [SL]   1526 You have slight concern for underlying alcohol withdrawal will give low-dose Librium [SL]   1554 Patient with no significant reaction atropine [SL]   1614 Hemoglobin normal limits no leukocytosis we will continue to monitor [SL]   1628 Patient appears to have a component of DIA, will continue to hydrate blood pressure improving [SL]   1722 Consult Dr. JENNIFER Alcantar spoke to the admitting medical service about the patient's clinical workup as well as the  agrees to admission to their service at this  time. Accepting physician will continue the medical evaluation/workup and treatment plan upon admission and add additional testing, interventions/treatment as necessary [SL]      ED Course User Index  [SL] Jeff Coe DO         Diagnoses as of 03/20/25 2128   Symptomatic bradycardia   DIA (acute kidney injury) (CMS-HCC)   Dehydration                 No data recorded     Curtis Coma Scale Score: 15 (03/20/25 1951 : Judd López RN)                           Medical Decision Making  Parts of this chart have been completed using voice recognition software. Please excuse any errors of transcription.  My thought process and reason for plan has been formulated from the time that I saw the patient until the time of disposition and is not specific to one specific moment during their visit and furthermore my MDM encompasses this entire chart and not only this text box.      HPI: Detailed above.    Exam: A medically appropriate exam performed, outlined above, given the known history and presentation.    History obtained from: Patient    EKG: See my supervising physician's EKG to potation    Medications given during visit:  Medications   amiodarone (Pacerone) tablet 200 mg ( oral Dose Auto Held 3/27/25 0900)   apixaban (Eliquis) tablet 5 mg (5 mg oral Given 3/20/25 2027)   aspirin chewable tablet 81 mg (81 mg oral Given 3/20/25 2026)   cloNIDine (Catapres) tablet 0.1 mg ( oral Dose Auto Held 3/26/25 2100)   escitalopram (Lexapro) tablet 20 mg ( oral MAR Unhold 3/20/25 1628)   ezetimibe (Zetia) tablet 10 mg ( oral MAR Unhold 3/20/25 1628)   ferrous sulfate tablet 325 mg ( oral MAR Unhold 3/20/25 1628)   mirtazapine (Remeron) tablet 45 mg (45 mg oral Given 3/20/25 2027)   acetaminophen (Tylenol) tablet 650 mg ( oral MAR Unhold 3/20/25 1628)     Or   acetaminophen (Tylenol) oral liquid 650 mg ( oral MAR Unhold 3/20/25 1628)     Or   acetaminophen (Tylenol) suppository 650 mg ( rectal MAR Unhold 3/20/25 1628)    melatonin tablet 3 mg (3 mg oral Given 3/20/25 2026)   polyethylene glycol (Glycolax, Miralax) packet 17 g ( oral MAR Unhold 3/20/25 1628)   docusate sodium (Colace) capsule 100 mg (100 mg oral Given 3/20/25 2026)   ondansetron (Zofran) tablet 4 mg ( oral MAR Unhold 3/20/25 1628)     Or   ondansetron (Zofran) injection 4 mg ( intravenous MAR Unhold 3/20/25 1628)   oxygen (O2) therapy (21 percent inhalation Start 3/20/25 1946)   rosuvastatin (Crestor) tablet 40 mg ( oral MAR Unhold 3/20/25 1628)   amLODIPine (Norvasc) tablet 2.5 mg ( oral MAR Unhold 3/20/25 1628)   sodium chloride 0.9 % bolus 1,000 mL (0 mL intravenous Stopped 3/18/25 1628)   atropine injection 1 mg (1 mg intravenous Given 3/18/25 1546)   magnesium sulfate 2 g in sterile water for injection 50 mL (0 g intravenous Stopped 3/18/25 1704)   perflutren lipid microspheres (Definity) injection 0.5-10 mL of dilution (3 mL of dilution intravenous Given 3/20/25 1019)   sodium chloride 0.9 % bolus 500 mL (0 mL intravenous Stopped 3/19/25 1658)   magnesium sulfate 1 g in dextrose 5%  mL (0 g intravenous Stopped 3/20/25 0643)        Diagnostic/tests  Labs Reviewed   CBC WITH AUTO DIFFERENTIAL - Abnormal       Result Value    WBC 5.1      nRBC 0.0      RBC 3.91 (*)     Hemoglobin 12.0 (*)     Hematocrit 37.0 (*)     MCV 95      MCH 30.7      MCHC 32.4      RDW 13.2      Platelets 251      Neutrophils % 58.6      Immature Granulocytes %, Automated 0.4      Lymphocytes % 26.4      Monocytes % 9.1      Eosinophils % 4.9      Basophils % 0.6      Neutrophils Absolute 2.98      Immature Granulocytes Absolute, Automated 0.02      Lymphocytes Absolute 1.34      Monocytes Absolute 0.46      Eosinophils Absolute 0.25      Basophils Absolute 0.03     COMPREHENSIVE METABOLIC PANEL - Abnormal    Glucose 87      Sodium 134 (*)     Potassium 5.4 (*)     Chloride 103      Bicarbonate 28      Anion Gap 8 (*)     Urea Nitrogen 31 (*)     Creatinine 2.75 (*)     eGFR 25 (*)      Calcium 8.6      Albumin 3.7      Alkaline Phosphatase 70      Total Protein 6.5      AST 28      Bilirubin, Total 0.4      ALT 27     CBC - Abnormal    WBC 5.0      nRBC 0.0      RBC 4.31 (*)     Hemoglobin 13.1 (*)     Hematocrit 41.4      MCV 96      MCH 30.4      MCHC 31.6 (*)     RDW 13.2      Platelets 248     COMPREHENSIVE METABOLIC PANEL - Abnormal    Glucose 94      Sodium 138      Potassium 4.6      Chloride 107      Bicarbonate 27      Anion Gap 9 (*)     Urea Nitrogen 27 (*)     Creatinine 2.03 (*)     eGFR 35 (*)     Calcium 8.9      Albumin 3.7      Alkaline Phosphatase 72      Total Protein 6.6      AST 36      Bilirubin, Total 0.4      ALT 32     RENAL FUNCTION PANEL - Abnormal    Glucose 78      Sodium 139      Potassium 4.9      Chloride 110 (*)     Bicarbonate 25      Anion Gap 9 (*)     Urea Nitrogen 27 (*)     Creatinine 2.12 (*)     eGFR 34 (*)     Calcium 8.1 (*)     Phosphorus 2.8      Albumin 3.2 (*)    CBC - Abnormal    WBC 6.2      nRBC 0.0      RBC 3.79 (*)     Hemoglobin 11.8 (*)     Hematocrit 36.0 (*)     MCV 95      MCH 31.1      MCHC 32.8      RDW 13.3      Platelets 223     CBC WITH AUTO DIFFERENTIAL - Abnormal    WBC 5.4      nRBC 0.0      RBC 4.06 (*)     Hemoglobin 12.4 (*)     Hematocrit 38.1 (*)     MCV 94      MCH 30.5      MCHC 32.5      RDW 13.3      Platelets 210      Neutrophils % 59.0      Immature Granulocytes %, Automated 0.2      Lymphocytes % 23.9      Monocytes % 10.6      Eosinophils % 5.6      Basophils % 0.7      Neutrophils Absolute 3.16      Immature Granulocytes Absolute, Automated 0.01      Lymphocytes Absolute 1.28      Monocytes Absolute 0.57      Eosinophils Absolute 0.30      Basophils Absolute 0.04     COMPREHENSIVE METABOLIC PANEL - Abnormal    Glucose 87      Sodium 138      Potassium 5.0      Chloride 108 (*)     Bicarbonate 26      Anion Gap 9 (*)     Urea Nitrogen 27 (*)     Creatinine 1.84 (*)     eGFR 40 (*)     Calcium 8.4 (*)     Albumin 3.3  (*)     Alkaline Phosphatase 65      Total Protein 6.0 (*)     AST 31      Bilirubin, Total 0.4      ALT 32     URINALYSIS WITH REFLEX CULTURE AND MICROSCOPIC - Normal    Color, Urine Light-Yellow      Appearance, Urine Clear      Specific Gravity, Urine 1.012      pH, Urine 5.5      Protein, Urine NEGATIVE      Glucose, Urine Normal      Blood, Urine NEGATIVE      Ketones, Urine NEGATIVE      Bilirubin, Urine NEGATIVE      Urobilinogen, Urine Normal      Nitrite, Urine NEGATIVE      Leukocyte Esterase, Urine NEGATIVE     DRUG SCREEN,URINE - Normal    Amphetamine Screen, Urine Presumptive Negative      Barbiturate Screen, Urine Presumptive Negative      Benzodiazepines Screen, Urine Presumptive Negative      Cannabinoid Screen, Urine Presumptive Negative      Cocaine Metabolite Screen, Urine Presumptive Negative      Fentanyl Screen, Urine Presumptive Negative      Opiate Screen, Urine Presumptive Negative      Oxycodone Screen, Urine Presumptive Negative      PCP Screen, Urine Presumptive Negative      Methadone Screen, Urine Presumptive Negative      Narrative:     Drug screen results are presumptive and should not be used to assess   compliance with prescribed medication. Contact the performing [facility] laboratory   to add-on definitive confirmatory testing if clinically indicated.    Toxicology screening results are reported qualitatively. The concentration must   be greater than or equal to the cutoff to be reported as positive. The concentration   at which the screening test can detect an individual drug or metabolite varies.   The absence of expected drug(s) and/or drug metabolite(s) may indicate non-compliance,   inappropriate timing of specimen collection relative to drug administration, poor drug   absorption, diluted/adulterated urine, or limitations of testing. For medical purposes   only; not valid for forensic use.    Interpretive questions should be directed to the laboratory medical directors.   ACUTE  TOXICOLOGY PANEL, BLOOD - Normal    Acetaminophen <10.0      Salicylate  <3      Alcohol <10     MAGNESIUM - Normal    Magnesium 2.28     TSH WITH REFLEX TO FREE T4 IF ABNORMAL - Normal    Thyroid Stimulating Hormone 1.77      Narrative:     TSH testing is performed using different testing methodology at [facility] than at other [facility]. Direct result comparisons should only be made within the same method.     MAGNESIUM - Normal    Magnesium 2.22     MAGNESIUM - Normal    Magnesium 1.97     PHOSPHORUS - Normal    Phosphorus 2.7     TROPONIN I, HIGH SENSITIVITY - Normal    Troponin I, High Sensitivity 4      Narrative:     Less than 99th percentile of normal range cutoff-  Female and children under 18 years old <14 ng/L; Male <21 ng/L: Negative  Repeat testing should be performed if clinically indicated.     Female and children under 18 years old 14-50 ng/L; Male 21-50 ng/L:  Consistent with possible cardiac damage and possible increased clinical   risk. Serial measurements may help to assess extent of myocardial damage.     >50 ng/L: Consistent with cardiac damage, increased clinical risk and  myocardial infarction. Serial measurements may help assess extent of   myocardial damage.      NOTE: Children less than 1 year old may have higher baseline troponin   levels and results should be interpreted in conjunction with the overall   clinical context.     NOTE: Troponin I testing is performed using a different   testing methodology at [facility] than at other   [facility]. Direct result comparisons should only   be made within the same method.   URINALYSIS WITH REFLEX CULTURE AND MICROSCOPIC    Narrative:     The following orders were created for panel order Urinalysis with Reflex Culture and Microscopic.  Procedure                               Abnormality         Status                     ---------                               -----------         ------                      Urinalysis with Reflex C...[[id number]]  Normal              Final result               Extra Urine Gray Tube[[id number]]                            Final result                 Please view results for these tests on the individual orders.   EXTRA URINE GRAY TUBE    Extra Tube Hold for add-ons.     CBC WITH AUTO DIFFERENTIAL   COMPREHENSIVE METABOLIC PANEL   MAGNESIUM   PHOSPHORUS      Transthoracic Echo (TTE) Complete   Final Result      US renal complete   Final Result   Normal ultrasound of the kidneys.        MACRO:   None.        Signed by: Rachael Leung 3/19/2025 8:09 AM   Dictation workstation:   [id number]      CT head wo IV contrast   Final Result   No CT evidence of acute intracranial injury.                  MACRO:   None             Signed by: Ruel Avendano 3/18/2025 5:09 PM   Dictation workstation:   [id number]      CT cervical spine wo IV contrast   Final Result   No evidence for an acute fracture or subluxation of the cervical   spine.        MACRO:   None        Signed by: Ruel Avendano 3/18/2025 5:10 PM   Dictation workstation:   [id number]      XR chest 1 view   Final Result   No evidence of acute cardiopulmonary process.        Signed by: Charla Delaney 3/18/2025 3:49 PM   Dictation workstation:   [id number]           Considerations/further MDM:  Patient was seen in conjucntion with my supervising physician,  Dr. Coe. Please refer to her note.    CT head and CT cervical spine were done due to fall, no acute findings.  Chest x-ray shows no acute cardiopulmonary process.  Labs show evidence of an DIA.  Patient was bradycardic on arrival.  He was given 1 mg of atropine without significant improvement.  He was also given 1 L IV normal saline.  Due to the magnesium he was given 2 g of magnesium sulfate as well.  He will be admitted for further management of bradycardia, DIA and dehydration causing lightheadedness and fall.  The patient was agreeable to this plan.  He was admitted in stable  condition to accepting physician, Dr. Alcantar.     Procedure  Procedures     Harriett Crawford PA-C  03/20/25 1009

## 2025-03-21 NOTE — CARE PLAN
Problem: Safety - Adult  Goal: Free from fall injury  Outcome: Progressing  Flowsheets (Taken 3/19/2025 2116 by Cassia Fallon RN)  Free from fall injury:   Instruct family/caregiver on patient safety   Based on caregiver fall risk screen, instruct family/caregiver to ask for assistance with transferring infant if caregiver noted to have fall risk factors     Problem: Discharge Planning  Goal: Discharge to home or other facility with appropriate resources  Outcome: Progressing     Problem: Chronic Conditions and Co-morbidities  Goal: Patient's chronic conditions and co-morbidity symptoms are monitored and maintained or improved  Outcome: Progressing     Problem: Nutrition  Goal: Nutrient intake appropriate for maintaining nutritional needs  Outcome: Progressing     Problem: Pain  Goal: Takes deep breaths with improved pain control throughout the shift  Outcome: Progressing  Goal: Turns in bed with improved pain control throughout the shift  Outcome: Progressing  Goal: Walks with improved pain control throughout the shift  Outcome: Progressing  Goal: Performs ADL's with improved pain control throughout shift  Outcome: Progressing  Goal: Participates in PT with improved pain control throughout the shift  Outcome: Progressing  Goal: Free from opioid side effects throughout the shift  Outcome: Progressing  Goal: Free from acute confusion related to pain meds throughout the shift  Outcome: Progressing   The patient's goals for the shift include rest    The clinical goals for the shift include pt will be stable

## 2025-03-21 NOTE — PROGRESS NOTES
Physical Therapy                 Therapy Communication Note    Patient Name: Dilshad Freeman  MRN: [id number]  Department: [address]  Room: [address] [place] [address]  Today's Date: 3/21/2025     Discipline: Physical Therapy    PT Missed Visit: Yes     Missed Visit Reason: Missed Visit Reason: Other (Comment) (Pt reporting 5/10 abd pain, requesting to rest at this time.)    Missed Time: Attempt    Comment:

## 2025-03-21 NOTE — PROGRESS NOTES
Physical Therapy    Physical Therapy Treatment    Patient Name: Dilshad Freeman  MRN: [id number]  Department: [address]  Room: [address] [place] [address]  Today's Date: 3/21/2025  Time Calculation  Start Time: 1310  Stop Time: 1340  Time Calculation (min): 30 min         Assessment/Plan   PT Assessment  Rehab Prognosis: Good  Barriers to Discharge Home: No anticipated barriers  End of Session Communication: Bedside nurse  Assessment Comment: Pt progressing steadily towards stated goals. Pt req'd less assist for all functional mobility. Pt would continue to benefit from skilled therapy services to maximize safety and independence.  End of Session Patient Position: Bed, 2 rail up, Alarm off, not on at start of session  PT Plan  Inpatient/Swing Bed or Outpatient: Inpatient  PT Plan  Treatment/Interventions: Bed mobility, Transfer training, Gait training, Strengthening, Therapeutic exercise, Therapeutic activity, Balance training  PT Plan: Ongoing PT  PT Frequency: 5 times per week  PT Discharge Recommendations: Low intensity level of continued care  PT Recommended Transfer Status: Stand by assist, Contact guard  PT - OK to Discharge: Yes      General Visit Information:   PT  Visit  PT Received On: 03/21/25  General  Reason for Referral: Impaired functional mobility due to decreased muscular strength. This 64 year old was admitted for symptomatic bradycardia, fall, coarse tremors, and DIA.  Past Medical History Relevant to Rehab: A-fib, anxiety/depression, BPH, CKD-3, CAD, DM, HTN, alcoholism, prostate cancer, heart failure, lumbar post laminectomy syndrome, seizure disorder, MATHEUS, lupus, hyperparathyroidism, bilat PIERCE  PT Missed Visit: Yes  Missed Visit Reason: Other (Comment) (Pt reporting 5/10 abd pain, requesting to rest at this time.)  Prior to Session Communication: Bedside nurse  Patient Position Received: Bed, 2 rail up, Alarm off, not on at start of session  General Comment: Pt cleared for PT by nursing. Pt agreeable to PT  services.    Subjective   Precautions:  Precautions  Medical Precautions: Fall precautions  Precautions Comment: + tele      Objective   Pain:  Pain Assessment  Pain Assessment: 0-10  0-10 (Numeric) Pain Score: 0 - No pain  Cognition:  Cognition  Overall Cognitive Status: Within Functional Limits  Orientation Level: Oriented X4    Postural Control:  Static Sitting Balance  Static Sitting-Balance Support: Feet supported  Static Sitting-Level of Assistance: Independent  Static Sitting-Comment/Number of Minutes: good seated static balance  Static Standing Balance  Static Standing-Balance Support: Right upper extremity supported  Static Standing-Level of Assistance: Distant supervision  Static Standing-Comment/Number of Minutes: good static stand balance  Dynamic Standing Balance  Dynamic Standing-Balance Support: Right upper extremity supported  Dynamic Standing-Level of Assistance: Close supervision  Dynamic Standing-Balance: Reaching for objects, Lateral lean, Forward lean  Dynamic Standing-Comments: fair + dynamic stand balance    Treatments:  Balance/Neuromuscular Re-Education  Balance/Neuromuscular Re-Education Activity Performed: Yes  Balance/Neuromuscular Re-Education Activity 1: Dynamic standing balance reaching outside TRENT in multiple planes to perform hygiene at sink with intermittent single UE support for stability. No sway or LOB.    Bed Mobility  Bed Mobility: Yes  Bed Mobility 1  Bed Mobility 1: Supine to sitting, Sitting to supine  Level of Assistance 1: Distant supervision  Bed Mobility Comments 1: HOB elevated. Sup for safety    Ambulation/Gait Training  Ambulation/Gait Training Performed: Yes  Ambulation/Gait Training 1  Surface 1: Level tile  Device 1: Single point cane  Assistance 1: Distant supervision  Quality of Gait 1: Wide base of support, Decreased step length  Comments/Distance (ft) 1: 15 feet x2. Pt able to navigate directional turns and small spaces with no LOB.  Transfers  Transfer:  Yes  Transfer 1  Transfer From 1: Stand to, Sit to  Transfer to 1: Sit, Stand  Technique 1: Sit to stand, Stand to sit  Transfer Device 1: Cane  Transfer Level of Assistance 1: Distant supervision  Trials/Comments 1: Pt demos safe technique/awareness throughout session.    Outcome Measures:  [facility] Basic Mobility  Turning from your back to your side while in a flat bed without using bedrails: None  Moving from lying on your back to sitting on the side of a flat bed without using bedrails: None  Moving to and from bed to chair (including a wheelchair): A little  Standing up from a chair using your arms (e.g. wheelchair or bedside chair): A little  To walk in hospital room: A little  Climbing 3-5 steps with railing: A little  Basic Mobility - Total Score: 20    Education Documentation  Precautions, taught by Gemma Mayorga PTA at 3/21/2025  2:14 PM.  Learner: Patient  Readiness: Acceptance  Method: Explanation, Demonstration  Response: Verbalizes Understanding, Demonstrated Understanding  Comment: Edu on POC, safety, and continued mobility.    Mobility Training, taught by Gemma Mayorga PTA at 3/21/2025  2:14 PM.  Learner: Patient  Readiness: Acceptance  Method: Explanation, Demonstration  Response: Verbalizes Understanding, Demonstrated Understanding  Comment: Edu on POC, safety, and continued mobility.    Education Comments  No comments found.        Encounter Problems       Encounter Problems (Active)       PT Problem       Pt will transition supine<>sit with mod I.  (Progressing)       Start:  03/20/25    Expected End:  04/06/25            Pt will transfer sit<>stand with straight cane & mod I.  (Progressing)       Start:  03/20/25    Expected End:  04/06/25            Pt will ambulate >/=100 ft with straight cane & mod I.  (Progressing)       Start:  03/20/25    Expected End:  04/06/25

## 2025-03-21 NOTE — PROGRESS NOTES
Pt to return to her home at [facility] at dc, pt is wheelchair bound and will not need any skilled services at [facility]. Please make care transitions aware of when pt is cleared for dc to assist with dc back to facility.      03/21/25 2634   Discharge Planning   Expected Discharge Disposition Inter  ([facility])

## 2025-03-21 NOTE — NURSING NOTE
Assumed care of patient. Patient is resting in bed with no needs. He was made aware to call on the light if he need to go to the bathroom. He has his cane at bedside. Call light is in place will continue to monitor.

## 2025-03-21 NOTE — CARE PLAN
The patient's goals for the shift include rest    The clinical goals for the shift include pt will be stable      Problem: Safety - Adult  Goal: Free from fall injury  Outcome: Progressing     Problem: Discharge Planning  Goal: Discharge to home or other facility with appropriate resources  Outcome: Progressing     Problem: Chronic Conditions and Co-morbidities  Goal: Patient's chronic conditions and co-morbidity symptoms are monitored and maintained or improved  Outcome: Progressing     Problem: Nutrition  Goal: Nutrient intake appropriate for maintaining nutritional needs  Outcome: Progressing     Problem: Pain  Goal: Takes deep breaths with improved pain control throughout the shift  Outcome: Progressing  Goal: Turns in bed with improved pain control throughout the shift  Outcome: Progressing  Goal: Walks with improved pain control throughout the shift  Outcome: Progressing  Goal: Performs ADL's with improved pain control throughout shift  Outcome: Progressing  Goal: Participates in PT with improved pain control throughout the shift  Outcome: Progressing  Goal: Free from opioid side effects throughout the shift  Outcome: Progressing  Goal: Free from acute confusion related to pain meds throughout the shift  Outcome: Progressing     Problem: Fall/Injury  Goal: Not fall by end of shift  Outcome: Progressing  Goal: Be free from injury by end of the shift  Outcome: Progressing  Goal: Verbalize understanding of personal risk factors for fall in the hospital  Outcome: Progressing  Goal: Verbalize understanding of risk factor reduction measures to prevent injury from fall in the home  Outcome: Progressing  Goal: Use assistive devices by end of the shift  Outcome: Progressing  Goal: Pace activities to prevent fatigue by end of the shift  Outcome: Progressing

## 2025-03-21 NOTE — PROGRESS NOTES
"Dilshad Freeman is a 64 y.o. male on day 3 of admission presenting with Symptomatic bradycardia.    Subjective   Alert and oriented, complaining of central intermittent cramping abdominal pain.  Patient reports having a normal bowel movement yesterday.  This morning his bowel movement was hard.  Patient reports chronic bright red blood with his stools, and when wiping due to hemorrhoids.       Objective     Physical Exam     Appearance: Alert, oriented, cooperative.    Eyes: Conjunctiva pink with no redness or exudates. Eyelids without lesions. No scleral icterus.     ENT: Hearing grossly intact. External inspection of ears without lesions or erythema. no nasal flaring. no tripoding, no drooling, no difficulty swallowing oral secretions.    Pulmonary: Clear bilaterally with good chest wall excursion. No rales, rhonchi or wheezing. No accessory muscle use or stridor. No difficulty completing a full sentence without taking a breath    Cardiac: Regular rhythm, and rate, no rub, gallop. No JVD.    GI: No tenderness to palpation of all quadrants, no guarding or rebound tenderness.  Normal active bowel sounds    Musculoskeletal: No cyanosis, clubbing.  No lower extremity edema, capillary refill less than 2 seconds    Neurological: no focal findings identified.    Psychiatric: Appropriate mood and affect.      Last Recorded Vitals  Blood pressure (!) 170/92, pulse 67, temperature 36.8 °C (98.2 °F), temperature source Oral, resp. rate 15, height 1.727 m (5' 8\"), weight 116 kg (256 lb 6.3 oz), SpO2 97%.  Intake/Output last 3 Shifts:  I/O last 3 completed shifts:  In: 1043.7 (9 mL/kg) [P.O.:222; I.V.:321.7 (2.8 mL/kg); IV Piggyback:500]  Out: 775 (6.7 mL/kg) [Urine:775 (0.2 mL/kg/hr)]  Weight: 116.3 kg     Relevant Results  Results for orders placed or performed during the hospital encounter of 03/18/25 (from the past 24 hours)   CBC and Auto Differential   Result Value Ref Range    WBC 5.6 4.4 - 11.3 x10*3/uL    nRBC 0.0 0.0 " , 7w1d    Pt called in with pos home Covid test. Recommended VV with PCP for Paxlovid and sent mychart with list of safe medications in pregnancy. Pt verbalized understanding.    Shantelle Grider RN on 2022 at 1:25 PM     - 0.0 /100 WBCs    RBC 4.31 (L) 4.50 - 5.90 x10*6/uL    Hemoglobin 13.0 (L) 13.5 - 17.5 g/dL    Hematocrit 40.2 (L) 41.0 - 52.0 %    MCV 93 80 - 100 fL    MCH 30.2 26.0 - 34.0 pg    MCHC 32.3 32.0 - 36.0 g/dL    RDW 13.1 11.5 - 14.5 %    Platelets 228 150 - 450 x10*3/uL    Neutrophils % 65.7 40.0 - 80.0 %    Immature Granulocytes %, Automated 0.4 0.0 - 0.9 %    Lymphocytes % 20.9 13.0 - 44.0 %    Monocytes % 8.2 2.0 - 10.0 %    Eosinophils % 4.3 0.0 - 6.0 %    Basophils % 0.5 0.0 - 2.0 %    Neutrophils Absolute 3.71 1.20 - 7.70 x10*3/uL    Immature Granulocytes Absolute, Automated 0.02 0.00 - 0.70 x10*3/uL    Lymphocytes Absolute 1.18 (L) 1.20 - 4.80 x10*3/uL    Monocytes Absolute 0.46 0.10 - 1.00 x10*3/uL    Eosinophils Absolute 0.24 0.00 - 0.70 x10*3/uL    Basophils Absolute 0.03 0.00 - 0.10 x10*3/uL   Comprehensive Metabolic Panel   Result Value Ref Range    Glucose 107 (H) 74 - 99 mg/dL    Sodium 140 136 - 145 mmol/L    Potassium 4.2 3.5 - 5.3 mmol/L    Chloride 109 (H) 98 - 107 mmol/L    Bicarbonate 25 21 - 32 mmol/L    Anion Gap 10 10 - 20 mmol/L    Urea Nitrogen 21 6 - 23 mg/dL    Creatinine 1.50 (H) 0.50 - 1.30 mg/dL    eGFR 51 (L) >60 mL/min/1.73m*2    Calcium 8.9 8.6 - 10.3 mg/dL    Albumin 3.6 3.4 - 5.0 g/dL    Alkaline Phosphatase 78 33 - 136 U/L    Total Protein 6.3 (L) 6.4 - 8.2 g/dL    AST 29 9 - 39 U/L    Bilirubin, Total 0.5 0.0 - 1.2 mg/dL    ALT 32 10 - 52 U/L   Magnesium   Result Value Ref Range    Magnesium 1.62 1.60 - 2.40 mg/dL   Phosphorus   Result Value Ref Range    Phosphorus 2.9 2.5 - 4.9 mg/dL     *Note: Due to a large number of results and/or encounters for the requested time period, some results have not been displayed. A complete set of results can be found in Results Review.     Scheduled medications  [Held by provider] amiodarone, 200 mg, oral, Daily  amLODIPine, 2.5 mg, oral, Daily  apixaban, 5 mg, oral, q12h  aspirin, 81 mg, oral, Nightly  [Held by provider] cloNIDine, 0.1 mg,  oral, TID  docusate sodium, 100 mg, oral, BID  escitalopram, 20 mg, oral, Daily  ezetimibe, 10 mg, oral, Daily  ferrous sulfate, 1 tablet, oral, Daily  mirtazapine, 45 mg, oral, Nightly  oxygen, , inhalation, Nightly  polyethylene glycol, 17 g, oral, Daily  rosuvastatin, 40 mg, oral, Daily      Continuous medications     PRN medications  PRN medications: acetaminophen **OR** acetaminophen **OR** acetaminophen, calcium carbonate, melatonin, ondansetron **OR** ondansetron                         Assessment/Plan   Assessment & Plan  Symptomatic bradycardia    Anxiety    Bariatric surgery status    Hypercholesterolemia    Lumbar postlaminectomy syndrome    Morbid obesity (Multi)    Obstructive sleep apnea, adult    Carcinoma of prostate (Multi)    Diabetes mellitus without complication (Multi)    Dyslipidemia    Seizure disorder (Multi)    Paroxysmal atrial fibrillation (Multi)    CAD (coronary artery disease)    Fall    Coarse tremors    DIA (acute kidney injury) (CMS-[facility])      Paroxysmal atrial fibrillation on Eliquis  Chronic diastolic heart failure  Coronary artery disease, calcium score 370, (4/2022)  Hypertension  Chronic kidney disease stage III  Bradycardia  Alcohol abuse     3/19: As above, patient presenting with past medical history of chronic renal failure stage III, hypertension, coronary artery disease, diastolic heart failure, hyperlipidemia, paroxysmal atrial fibrillation on Eliquis, alcohol abuse, bilateral hip replacement, bradycardia, prostate cancer s/p radiation treatment, gastric sleeve presenting to the ER after a fall, syncopal episode, and 1.5 weeks of tremors.  Patient also reported feeling fatigued for the past several months.  New labs this morning show a sodium 138, potassium 4.6, BUN 27, improving creatinine 2.03, GFR 35, hemoglobin 13.1, hematocrit 41.4.  Blood pressure reviewed this morning 109/59, heart rate 44-45, 97% on room air. Patient denies prior dizziness, lightheadedness,  palpitations, chest pain, shortness of breath, or feelings of fast heart rate.  In the outpatient setting patient is on guideline directed therapy with metoprolol tartrate 50 mg twice daily, losartan 50 mg daily, rosuvastatin 40 mg daily, and amiodarone 200 mg daily for rhythm control of atrial fibrillation, and Eliquis 5 mg twice daily for stroke risk reduction in the setting of atrial fibrillation.  Patient has a history of alcohol abuse, he drinks at least half a gallon of [place] each day.  Patient has presented to the ER for alcohol intoxication, detox, 12/6/24, and alcoholic induced seizure.  Per patient he has 100-day sober from alcohol.  Cardiology was consulted due to bradycardia. Patient is unsure when his metoprolol was increased to twice a day.  Patient's metoprolol, was held due to bradycardia, and losartan due to acute kidney injury on chronic kidney disease.  On exam patient does not appear to be fluid overloaded, rhythm is regular, rate bradycardic.  On telemetry patient has remained bradycardic with rates predominantly in the mid 40s.  Patient was given 5 mg of amlodipine this morning due to elevated blood pressure 160/90, again on patient's arrival to the ER he was mildly hypotensive, 95/55.  Bradycardia for this patient's likely caused by increased dose of metoprolol which has been discontinued.  Echocardiogram was ordered.  We will continue to follow this patient's blood pressure, and heart rates closely.     3/20: Yesterday after given a dose of amlodipine in the morning patient's blood pressure dropped significantly.  Given IV fluid boluses but blood pressure remained in the 70 systolic range thus was transferred to the ICU for IV pressor agents.  Blood pressure has improved over the last 12 hours and currently 150/90.  We had stopped both the metoprolol and the clonidine yesterday.  Adding an antihypertensive with no rate modulating properties would be reasonable.  Will thus add amlodipine at  2.5 mg daily.  Follow blood pressures.  Okay to transfer out of the ICU back to a monitored unit.  Also discontinued the patient's amiodarone given the symptomatic bradycardia.  Will place consultation with Dr. Kiran for recommendations of whether we should resume amiodarone therapy, would pacemaker be recommended or not, or other recommendations in treatment of the atrial fibrillation.  Continue the apixaban for stroke prevention.  Echocardiogram study has been ordered and will be reviewed today.  Serum creatinine has decreased down to 1.8 on morning laboratory studies.  Overall clinically improved.    3/21: Patient's blood pressures have increased from yesterday, currently 170/92, after being given amlodipine 2.5 mg. On telemetry patient is in sinus rhythm with rates predominantly in the 60-70's, with possible 3 beat run of nonsustained V. Tach this morning, telemetry of poor quality due to artifact.  Labs reviewed show sodium 140, potassium 4.2, improving creatinine of 1.5, GFR 51, hemoglobin 13.0, hematocrit 40.2.  Echocardiogram was completed yesterday showing an estimated ejection fraction of 60-65%, normal left ventricular systolic function, nd mild left atrial dilation.  Appreciate consult from electrophysiology, recommend continuing to hold amiodarone, as it is not ideal given this patient's age, recommended using low-dose beta-blocker 12.5 mg of metoprolol twice a day for now without arrhythmic medication on board, as it will take several weeks-1 month for effects of amiodarone to no longer be seen.  Pacemaker was not recommended at this time.  Patient's creatinine has continued to improve, very mildly elevated from baseline, will continue to hold losartan at this time.  Patient's blood pressure has remained hypertensive today, patient will be given additional dose of amlodipine 2.5 mg, and begin 5 mg tomorrow.  We agree with recommendations from electrophysiology and will have the patient begin 12.5  mg of metoprolol twice daily. Patient was discussed with Dr. Weiner. We will continue to follow this patient's blood pressure and rates closely.       I spent 35 minutes in the professional and overall care of this patient.      Sarah Mullen PA-C

## 2025-03-22 DIAGNOSIS — I10 PRIMARY HYPERTENSION: ICD-10-CM

## 2025-03-22 DIAGNOSIS — R10.84 GENERALIZED ABDOMINAL PAIN: ICD-10-CM

## 2025-03-22 DIAGNOSIS — D64.9 ANEMIA, UNSPECIFIED TYPE: ICD-10-CM

## 2025-03-22 LAB
ALBUMIN SERPL BCP-MCNC: 3.6 G/DL (ref 3.4–5)
ALP SERPL-CCNC: 79 U/L (ref 33–136)
ALT SERPL W P-5'-P-CCNC: 32 U/L (ref 10–52)
ANION GAP SERPL CALCULATED.3IONS-SCNC: 10 MMOL/L (ref 10–20)
AST SERPL W P-5'-P-CCNC: 27 U/L (ref 9–39)
BASOPHILS # BLD AUTO: 0.04 X10*3/UL (ref 0–0.1)
BASOPHILS NFR BLD AUTO: 0.6 %
BILIRUB SERPL-MCNC: 0.5 MG/DL (ref 0–1.2)
BUN SERPL-MCNC: 14 MG/DL (ref 6–23)
CALCIUM SERPL-MCNC: 9.3 MG/DL (ref 8.6–10.3)
CHLORIDE SERPL-SCNC: 106 MMOL/L (ref 98–107)
CO2 SERPL-SCNC: 29 MMOL/L (ref 21–32)
CREAT SERPL-MCNC: 1.34 MG/DL (ref 0.5–1.3)
EGFRCR SERPLBLD CKD-EPI 2021: 58 ML/MIN/1.73M*2
EOSINOPHIL # BLD AUTO: 0.38 X10*3/UL (ref 0–0.7)
EOSINOPHIL NFR BLD AUTO: 6 %
ERYTHROCYTE [DISTWIDTH] IN BLOOD BY AUTOMATED COUNT: 13.2 % (ref 11.5–14.5)
GLUCOSE SERPL-MCNC: 85 MG/DL (ref 74–99)
HCT VFR BLD AUTO: 41.5 % (ref 41–52)
HGB BLD-MCNC: 13.5 G/DL (ref 13.5–17.5)
IMM GRANULOCYTES # BLD AUTO: 0.03 X10*3/UL (ref 0–0.7)
IMM GRANULOCYTES NFR BLD AUTO: 0.5 % (ref 0–0.9)
LYMPHOCYTES # BLD AUTO: 1.3 X10*3/UL (ref 1.2–4.8)
LYMPHOCYTES NFR BLD AUTO: 20.5 %
MAGNESIUM SERPL-MCNC: 1.6 MG/DL (ref 1.6–2.4)
MCH RBC QN AUTO: 30.6 PG (ref 26–34)
MCHC RBC AUTO-ENTMCNC: 32.5 G/DL (ref 32–36)
MCV RBC AUTO: 94 FL (ref 80–100)
MONOCYTES # BLD AUTO: 0.69 X10*3/UL (ref 0.1–1)
MONOCYTES NFR BLD AUTO: 10.9 %
NEUTROPHILS # BLD AUTO: 3.9 X10*3/UL (ref 1.2–7.7)
NEUTROPHILS NFR BLD AUTO: 61.5 %
NRBC BLD-RTO: 0 /100 WBCS (ref 0–0)
PHOSPHATE SERPL-MCNC: 3.3 MG/DL (ref 2.5–4.9)
PLATELET # BLD AUTO: 241 X10*3/UL (ref 150–450)
POTASSIUM SERPL-SCNC: 5.2 MMOL/L (ref 3.5–5.3)
PROT SERPL-MCNC: 6.6 G/DL (ref 6.4–8.2)
RBC # BLD AUTO: 4.41 X10*6/UL (ref 4.5–5.9)
SODIUM SERPL-SCNC: 140 MMOL/L (ref 136–145)
WBC # BLD AUTO: 6.3 X10*3/UL (ref 4.4–11.3)

## 2025-03-22 PROCEDURE — 84100 ASSAY OF PHOSPHORUS: CPT

## 2025-03-22 PROCEDURE — 2500000001 HC RX 250 WO HCPCS SELF ADMINISTERED DRUGS (ALT 637 FOR MEDICARE OP): Performed by: INTERNAL MEDICINE

## 2025-03-22 PROCEDURE — 2500000001 HC RX 250 WO HCPCS SELF ADMINISTERED DRUGS (ALT 637 FOR MEDICARE OP): Performed by: PHYSICIAN ASSISTANT

## 2025-03-22 PROCEDURE — 2500000002 HC RX 250 W HCPCS SELF ADMINISTERED DRUGS (ALT 637 FOR MEDICARE OP, ALT 636 FOR OP/ED)

## 2025-03-22 PROCEDURE — 80053 COMPREHEN METABOLIC PANEL: CPT

## 2025-03-22 PROCEDURE — 2500000005 HC RX 250 GENERAL PHARMACY W/O HCPCS: Performed by: INTERNAL MEDICINE

## 2025-03-22 PROCEDURE — 83735 ASSAY OF MAGNESIUM: CPT

## 2025-03-22 PROCEDURE — 36415 COLL VENOUS BLD VENIPUNCTURE: CPT

## 2025-03-22 PROCEDURE — 99233 SBSQ HOSP IP/OBS HIGH 50: CPT | Performed by: INTERNAL MEDICINE

## 2025-03-22 PROCEDURE — 2500000001 HC RX 250 WO HCPCS SELF ADMINISTERED DRUGS (ALT 637 FOR MEDICARE OP)

## 2025-03-22 PROCEDURE — 1200000002 HC GENERAL ROOM WITH TELEMETRY DAILY

## 2025-03-22 PROCEDURE — 85025 COMPLETE CBC W/AUTO DIFF WBC: CPT

## 2025-03-22 RX ORDER — AMLODIPINE BESYLATE 10 MG/1
10 TABLET ORAL DAILY
Status: DISCONTINUED | OUTPATIENT
Start: 2025-03-23 | End: 2025-03-24 | Stop reason: HOSPADM

## 2025-03-22 RX ORDER — METOPROLOL TARTRATE 25 MG/1
25 TABLET, FILM COATED ORAL 2 TIMES DAILY
Status: DISCONTINUED | OUTPATIENT
Start: 2025-03-22 | End: 2025-03-24 | Stop reason: HOSPADM

## 2025-03-22 RX ADMIN — METOPROLOL TARTRATE 12.5 MG: 25 TABLET, FILM COATED ORAL at 08:04

## 2025-03-22 RX ADMIN — GABAPENTIN 400 MG: 400 CAPSULE ORAL at 21:11

## 2025-03-22 RX ADMIN — Medication 2 L/MIN: at 20:15

## 2025-03-22 RX ADMIN — METOPROLOL TARTRATE 12.5 MG: 25 TABLET, FILM COATED ORAL at 01:21

## 2025-03-22 RX ADMIN — ESCITALOPRAM OXALATE 20 MG: 20 TABLET ORAL at 08:03

## 2025-03-22 RX ADMIN — ASPIRIN 81 MG: 81 TABLET, CHEWABLE ORAL at 21:12

## 2025-03-22 RX ADMIN — FERROUS SULFATE TAB 325 MG (65 MG ELEMENTAL FE) 325 MG: 325 (65 FE) TAB at 08:03

## 2025-03-22 RX ADMIN — GABAPENTIN 400 MG: 400 CAPSULE ORAL at 08:03

## 2025-03-22 RX ADMIN — EZETIMIBE 10 MG: 10 TABLET ORAL at 08:03

## 2025-03-22 RX ADMIN — DOCUSATE SODIUM 100 MG: 100 CAPSULE, LIQUID FILLED ORAL at 21:12

## 2025-03-22 RX ADMIN — APIXABAN 5 MG: 5 TABLET, FILM COATED ORAL at 21:13

## 2025-03-22 RX ADMIN — APIXABAN 5 MG: 5 TABLET, FILM COATED ORAL at 09:57

## 2025-03-22 RX ADMIN — Medication 21 PERCENT: at 00:11

## 2025-03-22 RX ADMIN — DOCUSATE SODIUM 100 MG: 100 CAPSULE, LIQUID FILLED ORAL at 21:11

## 2025-03-22 RX ADMIN — MIRTAZAPINE 45 MG: 15 TABLET, FILM COATED ORAL at 21:12

## 2025-03-22 RX ADMIN — DOCUSATE SODIUM 100 MG: 100 CAPSULE, LIQUID FILLED ORAL at 08:03

## 2025-03-22 RX ADMIN — AMLODIPINE BESYLATE 5 MG: 5 TABLET ORAL at 08:03

## 2025-03-22 RX ADMIN — ROSUVASTATIN 40 MG: 20 TABLET, FILM COATED ORAL at 08:03

## 2025-03-22 RX ADMIN — METOPROLOL TARTRATE 25 MG: 25 TABLET, FILM COATED ORAL at 21:11

## 2025-03-22 ASSESSMENT — PAIN - FUNCTIONAL ASSESSMENT
PAIN_FUNCTIONAL_ASSESSMENT: 0-10

## 2025-03-22 ASSESSMENT — COGNITIVE AND FUNCTIONAL STATUS - GENERAL
WALKING IN HOSPITAL ROOM: A LITTLE
MOBILITY SCORE: 24
DAILY ACTIVITIY SCORE: 24
DAILY ACTIVITIY SCORE: 24
CLIMB 3 TO 5 STEPS WITH RAILING: A LITTLE
MOBILITY SCORE: 22

## 2025-03-22 ASSESSMENT — PAIN SCALES - GENERAL
PAINLEVEL_OUTOF10: 0 - NO PAIN

## 2025-03-22 NOTE — CARE PLAN
Problem: Safety - Adult  Goal: Free from fall injury  Outcome: Progressing     Problem: Discharge Planning  Goal: Discharge to home or other facility with appropriate resources  Outcome: Progressing     Problem: Chronic Conditions and Co-morbidities  Goal: Patient's chronic conditions and co-morbidity symptoms are monitored and maintained or improved  Outcome: Progressing     Problem: Nutrition  Goal: Nutrient intake appropriate for maintaining nutritional needs  Outcome: Progressing     Problem: Pain  Goal: Takes deep breaths with improved pain control throughout the shift  Outcome: Progressing  Goal: Turns in bed with improved pain control throughout the shift  Outcome: Progressing  Goal: Walks with improved pain control throughout the shift  Outcome: Progressing  Goal: Performs ADL's with improved pain control throughout shift  Outcome: Progressing  Goal: Participates in PT with improved pain control throughout the shift  Outcome: Progressing  Goal: Free from opioid side effects throughout the shift  Outcome: Progressing  Goal: Free from acute confusion related to pain meds throughout the shift  Outcome: Progressing     Problem: Fall/Injury  Goal: Not fall by end of shift  Outcome: Progressing  Goal: Be free from injury by end of the shift  Outcome: Progressing  Goal: Verbalize understanding of personal risk factors for fall in the hospital  Outcome: Progressing  Goal: Verbalize understanding of risk factor reduction measures to prevent injury from fall in the home  Outcome: Progressing  Goal: Use assistive devices by end of the shift  Outcome: Progressing  Goal: Pace activities to prevent fatigue by end of the shift  Outcome: Progressing

## 2025-03-22 NOTE — PROGRESS NOTES
Dilshad Freeman is a 64 y.o. male on day 4 of admission presenting with Symptomatic bradycardia.    Subjective   Tremors are doing much better today       Objective     Physical Exam  Vitals reviewed.   Constitutional:       Appearance: Normal appearance. He is obese.   HENT:      Head: Normocephalic and atraumatic.      Right Ear: Tympanic membrane, ear canal and external ear normal.      Left Ear: Tympanic membrane, ear canal and external ear normal.      Nose: Nose normal.      Mouth/Throat:      Pharynx: Oropharynx is clear.   Eyes:      Extraocular Movements: Extraocular movements intact.      Conjunctiva/sclera: Conjunctivae normal.      Pupils: Pupils are equal, round, and reactive to light.   Cardiovascular:      Rate and Rhythm: Normal rate and regular rhythm.      Pulses: Normal pulses.      Heart sounds: Normal heart sounds.   Pulmonary:      Effort: Pulmonary effort is normal.      Breath sounds: Normal breath sounds.   Abdominal:      General: Abdomen is flat. Bowel sounds are normal.      Palpations: Abdomen is soft.   Musculoskeletal:      Cervical back: Normal range of motion and neck supple.   Skin:     General: Skin is warm and dry.   Neurological:      General: No focal deficit present.      Mental Status: He is alert and oriented to person, place, and time.   Psychiatric:         Mood and Affect: Mood normal.           Intake/Output last 3 Shifts:  I/O last 3 completed shifts:  In: 240 (2.1 mL/kg) [P.O.:240]  Out: - (0 mL/kg)   Weight: 115.3 kg     Relevant Results               Scheduled medications  [Held by provider] amiodarone, 200 mg, oral, Daily  [START ON 3/23/2025] amLODIPine, 10 mg, oral, Daily  apixaban, 5 mg, oral, q12h  aspirin, 81 mg, oral, Nightly  [Held by provider] cloNIDine, 0.1 mg, oral, TID  docusate sodium, 100 mg, oral, BID  escitalopram, 20 mg, oral, Daily  ezetimibe, 10 mg, oral, Daily  ferrous sulfate, 1 tablet, oral, Daily  gabapentin, 400 mg, oral, BID  metoprolol  tartrate, 25 mg, oral, BID  mirtazapine, 45 mg, oral, Nightly  oxygen, , inhalation, Nightly  polyethylene glycol, 17 g, oral, Daily  rosuvastatin, 40 mg, oral, Daily      Continuous medications       PRN medications  PRN medications: acetaminophen **OR** acetaminophen **OR** acetaminophen, calcium carbonate, melatonin, ondansetron **OR** ondansetron  Results for orders placed or performed during the hospital encounter of 03/18/25 (from the past 24 hours)   CBC and Auto Differential   Result Value Ref Range    WBC 6.3 4.4 - 11.3 x10*3/uL    nRBC 0.0 0.0 - 0.0 /100 WBCs    RBC 4.41 (L) 4.50 - 5.90 x10*6/uL    Hemoglobin 13.5 13.5 - 17.5 g/dL    Hematocrit 41.5 41.0 - 52.0 %    MCV 94 80 - 100 fL    MCH 30.6 26.0 - 34.0 pg    MCHC 32.5 32.0 - 36.0 g/dL    RDW 13.2 11.5 - 14.5 %    Platelets 241 150 - 450 x10*3/uL    Neutrophils % 61.5 40.0 - 80.0 %    Immature Granulocytes %, Automated 0.5 0.0 - 0.9 %    Lymphocytes % 20.5 13.0 - 44.0 %    Monocytes % 10.9 2.0 - 10.0 %    Eosinophils % 6.0 0.0 - 6.0 %    Basophils % 0.6 0.0 - 2.0 %    Neutrophils Absolute 3.90 1.20 - 7.70 x10*3/uL    Immature Granulocytes Absolute, Automated 0.03 0.00 - 0.70 x10*3/uL    Lymphocytes Absolute 1.30 1.20 - 4.80 x10*3/uL    Monocytes Absolute 0.69 0.10 - 1.00 x10*3/uL    Eosinophils Absolute 0.38 0.00 - 0.70 x10*3/uL    Basophils Absolute 0.04 0.00 - 0.10 x10*3/uL   Comprehensive Metabolic Panel   Result Value Ref Range    Glucose 85 74 - 99 mg/dL    Sodium 140 136 - 145 mmol/L    Potassium 5.2 3.5 - 5.3 mmol/L    Chloride 106 98 - 107 mmol/L    Bicarbonate 29 21 - 32 mmol/L    Anion Gap 10 10 - 20 mmol/L    Urea Nitrogen 14 6 - 23 mg/dL    Creatinine 1.34 (H) 0.50 - 1.30 mg/dL    eGFR 58 (L) >60 mL/min/1.73m*2    Calcium 9.3 8.6 - 10.3 mg/dL    Albumin 3.6 3.4 - 5.0 g/dL    Alkaline Phosphatase 79 33 - 136 U/L    Total Protein 6.6 6.4 - 8.2 g/dL    AST 27 9 - 39 U/L    Bilirubin, Total 0.5 0.0 - 1.2 mg/dL    ALT 32 10 - 52 U/L   Magnesium    Result Value Ref Range    Magnesium 1.60 1.60 - 2.40 mg/dL   Phosphorus   Result Value Ref Range    Phosphorus 3.3 2.5 - 4.9 mg/dL     *Note: Due to a large number of results and/or encounters for the requested time period, some results have not been displayed. A complete set of results can be found in Results Review.     No results found.                 Assessment/Plan   Assessment & Plan  Symptomatic bradycardia    Fall    Coarse tremors    Anxiety    Carcinoma of prostate (Multi)    CAD (coronary artery disease)    Diabetes mellitus without complication (Multi)    Dyslipidemia    Hypercholesterolemia    Morbid obesity (Multi)    Paroxysmal atrial fibrillation (Multi)    Lumbar postlaminectomy syndrome    Obstructive sleep apnea, adult    Seizure disorder (Multi)    Bariatric surgery status    DIA (acute kidney injury) (CMS-[facility])    Acute kidney injury improving  Ultrasound kidney normal  Bradycardia improving off the  amiodarone  Dr. Kiran's input noted  No plans for pacemaker  Patient's blood pressure is higher  Cardiology is managing the blood pressure medications  Tremors better with Neurontin  Plan for home health care upon discharge         I spent  minutes in the professional and overall care of this patient.      Debra Alcantar MD

## 2025-03-22 NOTE — CARE PLAN
Problem: Safety - Adult  Goal: Free from fall injury  Outcome: Progressing     Problem: Discharge Planning  Goal: Discharge to home or other facility with appropriate resources  Outcome: Progressing     Problem: Chronic Conditions and Co-morbidities  Goal: Patient's chronic conditions and co-morbidity symptoms are monitored and maintained or improved  Outcome: Progressing     Problem: Nutrition  Goal: Nutrient intake appropriate for maintaining nutritional needs  Outcome: Progressing     Problem: Pain  Goal: Takes deep breaths with improved pain control throughout the shift  Outcome: Progressing  Goal: Turns in bed with improved pain control throughout the shift  Outcome: Progressing  Goal: Walks with improved pain control throughout the shift  Outcome: Progressing  Goal: Performs ADL's with improved pain control throughout shift  Outcome: Progressing  Goal: Participates in PT with improved pain control throughout the shift  Outcome: Progressing  Goal: Free from opioid side effects throughout the shift  Outcome: Progressing  Goal: Free from acute confusion related to pain meds throughout the shift  Outcome: Progressing     Problem: Fall/Injury  Goal: Not fall by end of shift  Outcome: Progressing  Goal: Be free from injury by end of the shift  Outcome: Progressing  Goal: Verbalize understanding of personal risk factors for fall in the hospital  Outcome: Progressing  Goal: Verbalize understanding of risk factor reduction measures to prevent injury from fall in the home  Outcome: Progressing  Goal: Use assistive devices by end of the shift  Outcome: Progressing  Goal: Pace activities to prevent fatigue by end of the shift  Outcome: Progressing   The patient's goals for the shift include rest    The clinical goals for the shift include safety, stable vs/labs    Over the shift, the patient did not make progress toward the following goals. Barriers to progression include none. Recommendations to address these barriers  include none.

## 2025-03-22 NOTE — NURSING NOTE
Patient has no complaints. Patient in no visible distress. Bed is low and locked. Call light is in reach.

## 2025-03-22 NOTE — NURSING NOTE
Dr. ANUSHA Alcantar in to see patient and aware of patient's blood pressure of 177/113. No new orders received,vbr.

## 2025-03-22 NOTE — PROGRESS NOTES
"Dilshad Freeman is a 64 y.o. male on day 4 of admission presenting with Symptomatic bradycardia.    Subjective   Patient states that he feels well.  No lightheadedness or dizziness.  No syncope.  Began to ambulate the halls yesterday somewhat washed out and tired but overall doing much better.       Objective     Physical Exam  Eyes:      Conjunctiva/sclera: Conjunctivae normal.   Cardiovascular:      Rate and Rhythm: Normal rate and regular rhythm.      Heart sounds:      No gallop.   Pulmonary:      Breath sounds: Normal breath sounds. No wheezing, rhonchi or rales.   Abdominal:      Palpations: Abdomen is soft.   Neurological:      General: No focal deficit present.      Mental Status: He is alert.       Constitutional:       Appearance: Not in distress.   Eyes:      Conjunctiva/sclera: Conjunctivae normal.   Neck:      Vascular: JVD normal.   Pulmonary:      Breath sounds: Normal breath sounds. No wheezing. No rhonchi. No rales.   Cardiovascular:      Normal rate. Regular rhythm.      Murmurs: There is no murmur.      No gallop.  No click. No rub.   Abdominal:      Palpations: Abdomen is soft.   Neurological:      General: No focal deficit present.      Mental Status: Alert.        Last Recorded Vitals  Blood pressure (!) 142/95, pulse 57, temperature 36.8 °C (98.2 °F), temperature source Axillary, resp. rate 16, height 1.727 m (5' 8\"), weight 115 kg (254 lb 3.1 oz), SpO2 96%.  Intake/Output last 3 Shifts:  I/O last 3 completed shifts:  In: 240 (2.1 mL/kg) [P.O.:240]  Out: - (0 mL/kg)   Weight: 115.3 kg     Relevant Results                 Results for orders placed or performed during the hospital encounter of 03/18/25 (from the past 24 hours)   CBC and Auto Differential   Result Value Ref Range    WBC 6.3 4.4 - 11.3 x10*3/uL    nRBC 0.0 0.0 - 0.0 /100 WBCs    RBC 4.41 (L) 4.50 - 5.90 x10*6/uL    Hemoglobin 13.5 13.5 - 17.5 g/dL    Hematocrit 41.5 41.0 - 52.0 %    MCV 94 80 - 100 fL    MCH 30.6 26.0 - 34.0 pg    " MCHC 32.5 32.0 - 36.0 g/dL    RDW 13.2 11.5 - 14.5 %    Platelets 241 150 - 450 x10*3/uL    Neutrophils % 61.5 40.0 - 80.0 %    Immature Granulocytes %, Automated 0.5 0.0 - 0.9 %    Lymphocytes % 20.5 13.0 - 44.0 %    Monocytes % 10.9 2.0 - 10.0 %    Eosinophils % 6.0 0.0 - 6.0 %    Basophils % 0.6 0.0 - 2.0 %    Neutrophils Absolute 3.90 1.20 - 7.70 x10*3/uL    Immature Granulocytes Absolute, Automated 0.03 0.00 - 0.70 x10*3/uL    Lymphocytes Absolute 1.30 1.20 - 4.80 x10*3/uL    Monocytes Absolute 0.69 0.10 - 1.00 x10*3/uL    Eosinophils Absolute 0.38 0.00 - 0.70 x10*3/uL    Basophils Absolute 0.04 0.00 - 0.10 x10*3/uL   Comprehensive Metabolic Panel   Result Value Ref Range    Glucose 85 74 - 99 mg/dL    Sodium 140 136 - 145 mmol/L    Potassium 5.2 3.5 - 5.3 mmol/L    Chloride 106 98 - 107 mmol/L    Bicarbonate 29 21 - 32 mmol/L    Anion Gap 10 10 - 20 mmol/L    Urea Nitrogen 14 6 - 23 mg/dL    Creatinine 1.34 (H) 0.50 - 1.30 mg/dL    eGFR 58 (L) >60 mL/min/1.73m*2    Calcium 9.3 8.6 - 10.3 mg/dL    Albumin 3.6 3.4 - 5.0 g/dL    Alkaline Phosphatase 79 33 - 136 U/L    Total Protein 6.6 6.4 - 8.2 g/dL    AST 27 9 - 39 U/L    Bilirubin, Total 0.5 0.0 - 1.2 mg/dL    ALT 32 10 - 52 U/L   Magnesium   Result Value Ref Range    Magnesium 1.60 1.60 - 2.40 mg/dL   Phosphorus   Result Value Ref Range    Phosphorus 3.3 2.5 - 4.9 mg/dL     *Note: Due to a large number of results and/or encounters for the requested time period, some results have not been displayed. A complete set of results can be found in Results Review.                 Assessment/Plan   Assessment & Plan  Symptomatic bradycardia    Anxiety    Bariatric surgery status    Hypercholesterolemia    Lumbar postlaminectomy syndrome    Morbid obesity (Multi)    Obstructive sleep apnea, adult    Carcinoma of prostate (Multi)    Diabetes mellitus without complication (Multi)    Dyslipidemia    Seizure disorder (Multi)    Paroxysmal atrial fibrillation (Multi)    CAD  (coronary artery disease)    Fall    Coarse tremors    DIA (acute kidney injury) (CMS-[facility])      Paroxysmal atrial fibrillation on Eliquis  Chronic diastolic heart failure  Coronary artery disease, calcium score 370, (4/2022)  Hypertension  Chronic kidney disease stage III  Bradycardia  Alcohol abuse     3/19: As above, patient presenting with past medical history of chronic renal failure stage III, hypertension, coronary artery disease, diastolic heart failure, hyperlipidemia, paroxysmal atrial fibrillation on Eliquis, alcohol abuse, bilateral hip replacement, bradycardia, prostate cancer s/p radiation treatment, gastric sleeve presenting to the ER after a fall, syncopal episode, and 1.5 weeks of tremors.  Patient also reported feeling fatigued for the past several months.  New labs this morning show a sodium 138, potassium 4.6, BUN 27, improving creatinine 2.03, GFR 35, hemoglobin 13.1, hematocrit 41.4.  Blood pressure reviewed this morning 109/59, heart rate 44-45, 97% on room air. Patient denies prior dizziness, lightheadedness, palpitations, chest pain, shortness of breath, or feelings of fast heart rate.  In the outpatient setting patient is on guideline directed therapy with metoprolol tartrate 50 mg twice daily, losartan 50 mg daily, rosuvastatin 40 mg daily, and amiodarone 200 mg daily for rhythm control of atrial fibrillation, and Eliquis 5 mg twice daily for stroke risk reduction in the setting of atrial fibrillation.  Patient has a history of alcohol abuse, he drinks at least half a gallon of [place] each day.  Patient has presented to the ER for alcohol intoxication, detox, 12/6/24, and alcoholic induced seizure.  Per patient he has 100-day sober from alcohol.  Cardiology was consulted due to bradycardia. Patient is unsure when his metoprolol was increased to twice a day.  Patient's metoprolol, was held due to bradycardia, and losartan due to acute kidney injury on chronic kidney disease.  On exam  patient does not appear to be fluid overloaded, rhythm is regular, rate bradycardic.  On telemetry patient has remained bradycardic with rates predominantly in the mid 40s.  Patient was given 5 mg of amlodipine this morning due to elevated blood pressure 160/90, again on patient's arrival to the ER he was mildly hypotensive, 95/55.  Bradycardia for this patient's likely caused by increased dose of metoprolol which has been discontinued.  Echocardiogram was ordered.  We will continue to follow this patient's blood pressure, and heart rates closely.     3/20: Yesterday after given a dose of amlodipine in the morning patient's blood pressure dropped significantly.  Given IV fluid boluses but blood pressure remained in the 70 systolic range thus was transferred to the ICU for IV pressor agents.  Blood pressure has improved over the last 12 hours and currently 150/90.  We had stopped both the metoprolol and the clonidine yesterday.  Adding an antihypertensive with no rate modulating properties would be reasonable.  Will thus add amlodipine at 2.5 mg daily.  Follow blood pressures.  Okay to transfer out of the ICU back to a monitored unit.  Also discontinued the patient's amiodarone given the symptomatic bradycardia.  Will place consultation with Dr. Kiran for recommendations of whether we should resume amiodarone therapy, would pacemaker be recommended or not, or other recommendations in treatment of the atrial fibrillation.  Continue the apixaban for stroke prevention.  Echocardiogram study has been ordered and will be reviewed today.  Serum creatinine has decreased down to 1.8 on morning laboratory studies.  Overall clinically improved.     3/21: Patient's blood pressures have increased from yesterday, currently 170/92, after being given amlodipine 2.5 mg. On telemetry patient is in sinus rhythm with rates predominantly in the 60-70's, with possible 3 beat run of nonsustained V. Tach this morning, telemetry of poor  quality due to artifact.  Labs reviewed show sodium 140, potassium 4.2, improving creatinine of 1.5, GFR 51, hemoglobin 13.0, hematocrit 40.2.  Echocardiogram was completed yesterday showing an estimated ejection fraction of 60-65%, normal left ventricular systolic function, nd mild left atrial dilation.  Appreciate consult from electrophysiology, recommend continuing to hold amiodarone, as it is not ideal given this patient's age, recommended using low-dose beta-blocker 12.5 mg of metoprolol twice a day for now without arrhythmic medication on board, as it will take several weeks-1 month for effects of amiodarone to no longer be seen.  Pacemaker was not recommended at this time.  Patient's creatinine has continued to improve, very mildly elevated from baseline, will continue to hold losartan at this time.  Patient's blood pressure has remained hypertensive today, patient will be given additional dose of amlodipine 2.5 mg, and begin 5 mg tomorrow.  We agree with recommendations from electrophysiology and will have the patient begin 12.5 mg of metoprolol twice daily. Patient was discussed with Dr. Weiner. We will continue to follow this patient's blood pressure and rates closely.    3/22: Patient continues to improve on a daily basis.  Patient now in a sinus rhythm heart rates in the 60 to 70 bpm range.  Reviewed recommendations by Dr. Bowden who suggested eliminating amiodarone altogether and treating with simple beta-blocker therapy.  We started the patient on metoprolol 12.5 mg twice daily and I will titrate up to 25 mg twice daily today.  Blood pressure is mildly elevated will increase amlodipine from 5 mg to 10 mg daily.  Serum creatinine 1.3 on morning laboratory studies today.  With overall improved clinical condition would increase activity level and begin discharge planning.  Believe we should be ready for discharge in next 1 to 2 days.          I spent 25 minutes in the professional and overall care of  this patient.      Casey Weiner, DO

## 2025-03-22 NOTE — PROGRESS NOTES
03/22/25 1440   Discharge Planning   Expected Discharge Disposition Home H     Patient agreeable to HHC at this Dr prado made aware     Will need internal referral for home health upon discharge  ** do not discharge without speaking to care coordination**

## 2025-03-23 VITALS
SYSTOLIC BLOOD PRESSURE: 137 MMHG | BODY MASS INDEX: 38.06 KG/M2 | OXYGEN SATURATION: 100 % | DIASTOLIC BLOOD PRESSURE: 79 MMHG | WEIGHT: 251.1 LBS | HEART RATE: 60 BPM | TEMPERATURE: 97.9 F | HEIGHT: 68 IN | RESPIRATION RATE: 18 BRPM

## 2025-03-23 LAB
ALBUMIN SERPL BCP-MCNC: 3.8 G/DL (ref 3.4–5)
ALP SERPL-CCNC: 87 U/L (ref 33–136)
ALT SERPL W P-5'-P-CCNC: 37 U/L (ref 10–52)
ANION GAP SERPL CALCULATED.3IONS-SCNC: 11 MMOL/L (ref 10–20)
AST SERPL W P-5'-P-CCNC: 31 U/L (ref 9–39)
BASOPHILS # BLD AUTO: 0.03 X10*3/UL (ref 0–0.1)
BASOPHILS NFR BLD AUTO: 0.5 %
BILIRUB SERPL-MCNC: 0.5 MG/DL (ref 0–1.2)
BUN SERPL-MCNC: 18 MG/DL (ref 6–23)
CALCIUM SERPL-MCNC: 9.5 MG/DL (ref 8.6–10.3)
CHLORIDE SERPL-SCNC: 105 MMOL/L (ref 98–107)
CO2 SERPL-SCNC: 30 MMOL/L (ref 21–32)
CREAT SERPL-MCNC: 1.36 MG/DL (ref 0.5–1.3)
EGFRCR SERPLBLD CKD-EPI 2021: 57 ML/MIN/1.73M*2
EOSINOPHIL # BLD AUTO: 0.41 X10*3/UL (ref 0–0.7)
EOSINOPHIL NFR BLD AUTO: 6.4 %
ERYTHROCYTE [DISTWIDTH] IN BLOOD BY AUTOMATED COUNT: 13.4 % (ref 11.5–14.5)
GLUCOSE SERPL-MCNC: 92 MG/DL (ref 74–99)
HCT VFR BLD AUTO: 43 % (ref 41–52)
HGB BLD-MCNC: 13.6 G/DL (ref 13.5–17.5)
IMM GRANULOCYTES # BLD AUTO: 0.04 X10*3/UL (ref 0–0.7)
IMM GRANULOCYTES NFR BLD AUTO: 0.6 % (ref 0–0.9)
LYMPHOCYTES # BLD AUTO: 1.42 X10*3/UL (ref 1.2–4.8)
LYMPHOCYTES NFR BLD AUTO: 22.3 %
MAGNESIUM SERPL-MCNC: 1.74 MG/DL (ref 1.6–2.4)
MCH RBC QN AUTO: 30 PG (ref 26–34)
MCHC RBC AUTO-ENTMCNC: 31.6 G/DL (ref 32–36)
MCV RBC AUTO: 95 FL (ref 80–100)
MONOCYTES # BLD AUTO: 0.66 X10*3/UL (ref 0.1–1)
MONOCYTES NFR BLD AUTO: 10.4 %
NEUTROPHILS # BLD AUTO: 3.8 X10*3/UL (ref 1.2–7.7)
NEUTROPHILS NFR BLD AUTO: 59.8 %
NRBC BLD-RTO: 0 /100 WBCS (ref 0–0)
PHOSPHATE SERPL-MCNC: 3.4 MG/DL (ref 2.5–4.9)
PLATELET # BLD AUTO: 253 X10*3/UL (ref 150–450)
POTASSIUM SERPL-SCNC: 5.5 MMOL/L (ref 3.5–5.3)
PROT SERPL-MCNC: 7 G/DL (ref 6.4–8.2)
RBC # BLD AUTO: 4.53 X10*6/UL (ref 4.5–5.9)
SODIUM SERPL-SCNC: 140 MMOL/L (ref 136–145)
WBC # BLD AUTO: 6.4 X10*3/UL (ref 4.4–11.3)

## 2025-03-23 PROCEDURE — 85025 COMPLETE CBC W/AUTO DIFF WBC: CPT

## 2025-03-23 PROCEDURE — 36415 COLL VENOUS BLD VENIPUNCTURE: CPT

## 2025-03-23 PROCEDURE — 2500000002 HC RX 250 W HCPCS SELF ADMINISTERED DRUGS (ALT 637 FOR MEDICARE OP, ALT 636 FOR OP/ED)

## 2025-03-23 PROCEDURE — 84100 ASSAY OF PHOSPHORUS: CPT

## 2025-03-23 PROCEDURE — 2500000001 HC RX 250 WO HCPCS SELF ADMINISTERED DRUGS (ALT 637 FOR MEDICARE OP): Performed by: INTERNAL MEDICINE

## 2025-03-23 PROCEDURE — 83735 ASSAY OF MAGNESIUM: CPT

## 2025-03-23 PROCEDURE — 1200000002 HC GENERAL ROOM WITH TELEMETRY DAILY

## 2025-03-23 PROCEDURE — 80053 COMPREHEN METABOLIC PANEL: CPT

## 2025-03-23 PROCEDURE — 2500000004 HC RX 250 GENERAL PHARMACY W/ HCPCS (ALT 636 FOR OP/ED)

## 2025-03-23 PROCEDURE — 2500000001 HC RX 250 WO HCPCS SELF ADMINISTERED DRUGS (ALT 637 FOR MEDICARE OP)

## 2025-03-23 PROCEDURE — 99232 SBSQ HOSP IP/OBS MODERATE 35: CPT | Performed by: INTERNAL MEDICINE

## 2025-03-23 RX ORDER — HYDRALAZINE HYDROCHLORIDE 25 MG/1
25 TABLET, FILM COATED ORAL 2 TIMES DAILY
Status: DISCONTINUED | OUTPATIENT
Start: 2025-03-23 | End: 2025-03-23

## 2025-03-23 RX ADMIN — AMLODIPINE BESYLATE 10 MG: 10 TABLET ORAL at 09:05

## 2025-03-23 RX ADMIN — APIXABAN 5 MG: 5 TABLET, FILM COATED ORAL at 10:14

## 2025-03-23 RX ADMIN — DOCUSATE SODIUM 100 MG: 100 CAPSULE, LIQUID FILLED ORAL at 09:05

## 2025-03-23 RX ADMIN — ROSUVASTATIN 40 MG: 20 TABLET, FILM COATED ORAL at 09:05

## 2025-03-23 RX ADMIN — GABAPENTIN 400 MG: 400 CAPSULE ORAL at 21:19

## 2025-03-23 RX ADMIN — GABAPENTIN 400 MG: 400 CAPSULE ORAL at 09:05

## 2025-03-23 RX ADMIN — ESCITALOPRAM OXALATE 20 MG: 20 TABLET ORAL at 09:05

## 2025-03-23 RX ADMIN — EZETIMIBE 10 MG: 10 TABLET ORAL at 09:05

## 2025-03-23 RX ADMIN — FERROUS SULFATE TAB 325 MG (65 MG ELEMENTAL FE) 325 MG: 325 (65 FE) TAB at 09:05

## 2025-03-23 RX ADMIN — METOPROLOL TARTRATE 25 MG: 25 TABLET, FILM COATED ORAL at 21:19

## 2025-03-23 RX ADMIN — DOCUSATE SODIUM 100 MG: 100 CAPSULE, LIQUID FILLED ORAL at 21:19

## 2025-03-23 RX ADMIN — ASPIRIN 81 MG: 81 TABLET, CHEWABLE ORAL at 21:19

## 2025-03-23 RX ADMIN — MIRTAZAPINE 45 MG: 15 TABLET, FILM COATED ORAL at 21:19

## 2025-03-23 RX ADMIN — METOPROLOL TARTRATE 25 MG: 25 TABLET, FILM COATED ORAL at 09:05

## 2025-03-23 RX ADMIN — POLYETHYLENE GLYCOL 3350 17 G: 17 POWDER, FOR SOLUTION ORAL at 13:06

## 2025-03-23 RX ADMIN — APIXABAN 5 MG: 5 TABLET, FILM COATED ORAL at 21:38

## 2025-03-23 ASSESSMENT — COGNITIVE AND FUNCTIONAL STATUS - GENERAL
DAILY ACTIVITIY SCORE: 24
DAILY ACTIVITIY SCORE: 24
MOBILITY SCORE: 24
MOBILITY SCORE: 24

## 2025-03-23 ASSESSMENT — PAIN SCALES - GENERAL
PAINLEVEL_OUTOF10: 0 - NO PAIN
PAINLEVEL_OUTOF10: 0 - NO PAIN

## 2025-03-23 ASSESSMENT — PAIN - FUNCTIONAL ASSESSMENT: PAIN_FUNCTIONAL_ASSESSMENT: 0-10

## 2025-03-23 NOTE — CARE PLAN
The patient's goals for the shift include rest    The clinical goals for the shift include maintain  safety    Over the shift, the patient did not make progress toward the following goals. Barriers to progression include decrease in mobility. Recommendations to address these barriers include monitor safety precautions and cardiac status.

## 2025-03-23 NOTE — PROGRESS NOTES
Dilshad Freeman is a 64 y.o. male on day 5 of admission presenting with Symptomatic bradycardia.    Subjective   Tremors are doing much better today.  Blood pressure is still fluctuating       Objective     Physical Exam  Vitals reviewed.   Constitutional:       Appearance: Normal appearance. He is obese.   HENT:      Head: Normocephalic and atraumatic.      Right Ear: Tympanic membrane, ear canal and external ear normal.      Left Ear: Tympanic membrane, ear canal and external ear normal.      Nose: Nose normal.      Mouth/Throat:      Pharynx: Oropharynx is clear.   Eyes:      Extraocular Movements: Extraocular movements intact.      Conjunctiva/sclera: Conjunctivae normal.      Pupils: Pupils are equal, round, and reactive to light.   Cardiovascular:      Rate and Rhythm: Normal rate and regular rhythm.      Pulses: Normal pulses.      Heart sounds: Normal heart sounds.   Pulmonary:      Effort: Pulmonary effort is normal.      Breath sounds: Normal breath sounds.   Abdominal:      General: Abdomen is flat. Bowel sounds are normal.      Palpations: Abdomen is soft.   Musculoskeletal:      Cervical back: Normal range of motion and neck supple.   Skin:     General: Skin is warm and dry.   Neurological:      General: No focal deficit present.      Mental Status: He is alert and oriented to person, place, and time.   Psychiatric:         Mood and Affect: Mood normal.           Intake/Output last 3 Shifts:  I/O last 3 completed shifts:  In: 960 (8.4 mL/kg) [P.O.:960]  Out: 1 (0 mL/kg) [Urine:1 (0 mL/kg/hr)]  Weight: 113.9 kg     Relevant Results               Scheduled medications  [Held by provider] amiodarone, 200 mg, oral, Daily  amLODIPine, 10 mg, oral, Daily  apixaban, 5 mg, oral, q12h  aspirin, 81 mg, oral, Nightly  [Held by provider] cloNIDine, 0.1 mg, oral, TID  docusate sodium, 100 mg, oral, BID  escitalopram, 20 mg, oral, Daily  ezetimibe, 10 mg, oral, Daily  ferrous sulfate, 1 tablet, oral,  Daily  gabapentin, 400 mg, oral, BID  metoprolol tartrate, 25 mg, oral, BID  mirtazapine, 45 mg, oral, Nightly  oxygen, , inhalation, Nightly  polyethylene glycol, 17 g, oral, Daily  rosuvastatin, 40 mg, oral, Daily      Continuous medications       PRN medications  PRN medications: acetaminophen **OR** acetaminophen **OR** acetaminophen, calcium carbonate, melatonin, ondansetron **OR** ondansetron  Results for orders placed or performed during the hospital encounter of 03/18/25 (from the past 24 hours)   CBC and Auto Differential   Result Value Ref Range    WBC 6.4 4.4 - 11.3 x10*3/uL    nRBC 0.0 0.0 - 0.0 /100 WBCs    RBC 4.53 4.50 - 5.90 x10*6/uL    Hemoglobin 13.6 13.5 - 17.5 g/dL    Hematocrit 43.0 41.0 - 52.0 %    MCV 95 80 - 100 fL    MCH 30.0 26.0 - 34.0 pg    MCHC 31.6 (L) 32.0 - 36.0 g/dL    RDW 13.4 11.5 - 14.5 %    Platelets 253 150 - 450 x10*3/uL    Neutrophils % 59.8 40.0 - 80.0 %    Immature Granulocytes %, Automated 0.6 0.0 - 0.9 %    Lymphocytes % 22.3 13.0 - 44.0 %    Monocytes % 10.4 2.0 - 10.0 %    Eosinophils % 6.4 0.0 - 6.0 %    Basophils % 0.5 0.0 - 2.0 %    Neutrophils Absolute 3.80 1.20 - 7.70 x10*3/uL    Immature Granulocytes Absolute, Automated 0.04 0.00 - 0.70 x10*3/uL    Lymphocytes Absolute 1.42 1.20 - 4.80 x10*3/uL    Monocytes Absolute 0.66 0.10 - 1.00 x10*3/uL    Eosinophils Absolute 0.41 0.00 - 0.70 x10*3/uL    Basophils Absolute 0.03 0.00 - 0.10 x10*3/uL   Comprehensive Metabolic Panel   Result Value Ref Range    Glucose 92 74 - 99 mg/dL    Sodium 140 136 - 145 mmol/L    Potassium 5.5 (H) 3.5 - 5.3 mmol/L    Chloride 105 98 - 107 mmol/L    Bicarbonate 30 21 - 32 mmol/L    Anion Gap 11 10 - 20 mmol/L    Urea Nitrogen 18 6 - 23 mg/dL    Creatinine 1.36 (H) 0.50 - 1.30 mg/dL    eGFR 57 (L) >60 mL/min/1.73m*2    Calcium 9.5 8.6 - 10.3 mg/dL    Albumin 3.8 3.4 - 5.0 g/dL    Alkaline Phosphatase 87 33 - 136 U/L    Total Protein 7.0 6.4 - 8.2 g/dL    AST 31 9 - 39 U/L    Bilirubin, Total  0.5 0.0 - 1.2 mg/dL    ALT 37 10 - 52 U/L   Magnesium   Result Value Ref Range    Magnesium 1.74 1.60 - 2.40 mg/dL   Phosphorus   Result Value Ref Range    Phosphorus 3.4 2.5 - 4.9 mg/dL     *Note: Due to a large number of results and/or encounters for the requested time period, some results have not been displayed. A complete set of results can be found in Results Review.     No results found.                 Assessment/Plan   Assessment & Plan  Symptomatic bradycardia    Fall    Coarse tremors    Anxiety    Carcinoma of prostate (Multi)    CAD (coronary artery disease)    Diabetes mellitus without complication (Multi)    Dyslipidemia    Hypercholesterolemia    Morbid obesity (Multi)    Paroxysmal atrial fibrillation (Multi)    Lumbar postlaminectomy syndrome    Obstructive sleep apnea, adult    Seizure disorder (Multi)    Bariatric surgery status    DIA (acute kidney injury) (CMS-[facility])    Acute kidney injury improving  Potassium slightly elevated  Will place patient on low potassium diet  Ultrasound kidney normal  Bradycardia improving off the  amiodarone  Dr. Kiran's input noted  No plans for pacemaker  Patient's blood pressure is higher  Cardiology added hydralazine  Cardiology is managing the blood pressure medications  Tremors better with Neurontin  Plan for home health care upon discharge         I spent  minutes in the professional and overall care of this patient.      Debra Alcantar MD

## 2025-03-23 NOTE — NURSING NOTE
Secure message sent to Dr. Weiner and Dr. ANUSHA Alcantar to make aware of patient's documented blood pressure and repeat. Awaiting response.

## 2025-03-23 NOTE — PROGRESS NOTES
"Dilshad Freeman is a 64 y.o. male on day 5 of admission presenting with Symptomatic bradycardia.    Subjective   Patient states he is feeling better each day.  Walking in the room and to the bathroom without any issues or problems.  No lightheadedness or dizziness.       Objective     Physical Exam  Eyes:      Conjunctiva/sclera: Conjunctivae normal.   Cardiovascular:      Rate and Rhythm: Normal rate and regular rhythm.      Heart sounds:      No gallop.   Pulmonary:      Breath sounds: Normal breath sounds. No wheezing, rhonchi or rales.   Abdominal:      Palpations: Abdomen is soft.   Neurological:      General: No focal deficit present.      Mental Status: He is alert.       Constitutional:       Appearance: Not in distress.   Eyes:      Conjunctiva/sclera: Conjunctivae normal.   Neck:      Vascular: JVD normal.   Pulmonary:      Breath sounds: Normal breath sounds. No wheezing. No rhonchi. No rales.   Cardiovascular:      Normal rate. Regular rhythm.      Murmurs: There is no murmur.      No gallop.  No click. No rub.   Abdominal:      Palpations: Abdomen is soft.   Neurological:      General: No focal deficit present.      Mental Status: Alert.        Last Recorded Vitals  Blood pressure 148/85, pulse 70, temperature 36.4 °C (97.5 °F), temperature source Axillary, resp. rate 20, height 1.727 m (5' 8\"), weight 114 kg (251 lb 1.7 oz), SpO2 97%.  Intake/Output last 3 Shifts:  I/O last 3 completed shifts:  In: 960 (8.4 mL/kg) [P.O.:960]  Out: 1 (0 mL/kg) [Urine:1 (0 mL/kg/hr)]  Weight: 113.9 kg     Relevant Results                 Results for orders placed or performed during the hospital encounter of 03/18/25 (from the past 24 hours)   CBC and Auto Differential   Result Value Ref Range    WBC 6.4 4.4 - 11.3 x10*3/uL    nRBC 0.0 0.0 - 0.0 /100 WBCs    RBC 4.53 4.50 - 5.90 x10*6/uL    Hemoglobin 13.6 13.5 - 17.5 g/dL    Hematocrit 43.0 41.0 - 52.0 %    MCV 95 80 - 100 fL    MCH 30.0 26.0 - 34.0 pg    MCHC 31.6 (L) " 32.0 - 36.0 g/dL    RDW 13.4 11.5 - 14.5 %    Platelets 253 150 - 450 x10*3/uL    Neutrophils % 59.8 40.0 - 80.0 %    Immature Granulocytes %, Automated 0.6 0.0 - 0.9 %    Lymphocytes % 22.3 13.0 - 44.0 %    Monocytes % 10.4 2.0 - 10.0 %    Eosinophils % 6.4 0.0 - 6.0 %    Basophils % 0.5 0.0 - 2.0 %    Neutrophils Absolute 3.80 1.20 - 7.70 x10*3/uL    Immature Granulocytes Absolute, Automated 0.04 0.00 - 0.70 x10*3/uL    Lymphocytes Absolute 1.42 1.20 - 4.80 x10*3/uL    Monocytes Absolute 0.66 0.10 - 1.00 x10*3/uL    Eosinophils Absolute 0.41 0.00 - 0.70 x10*3/uL    Basophils Absolute 0.03 0.00 - 0.10 x10*3/uL   Comprehensive Metabolic Panel   Result Value Ref Range    Glucose 92 74 - 99 mg/dL    Sodium 140 136 - 145 mmol/L    Potassium 5.5 (H) 3.5 - 5.3 mmol/L    Chloride 105 98 - 107 mmol/L    Bicarbonate 30 21 - 32 mmol/L    Anion Gap 11 10 - 20 mmol/L    Urea Nitrogen 18 6 - 23 mg/dL    Creatinine 1.36 (H) 0.50 - 1.30 mg/dL    eGFR 57 (L) >60 mL/min/1.73m*2    Calcium 9.5 8.6 - 10.3 mg/dL    Albumin 3.8 3.4 - 5.0 g/dL    Alkaline Phosphatase 87 33 - 136 U/L    Total Protein 7.0 6.4 - 8.2 g/dL    AST 31 9 - 39 U/L    Bilirubin, Total 0.5 0.0 - 1.2 mg/dL    ALT 37 10 - 52 U/L   Magnesium   Result Value Ref Range    Magnesium 1.74 1.60 - 2.40 mg/dL   Phosphorus   Result Value Ref Range    Phosphorus 3.4 2.5 - 4.9 mg/dL     *Note: Due to a large number of results and/or encounters for the requested time period, some results have not been displayed. A complete set of results can be found in Results Review.                 Assessment/Plan   Assessment & Plan  Symptomatic bradycardia    Anxiety    Bariatric surgery status    Hypercholesterolemia    Lumbar postlaminectomy syndrome    Morbid obesity (Multi)    Obstructive sleep apnea, adult    Carcinoma of prostate (Multi)    Diabetes mellitus without complication (Multi)    Dyslipidemia    Seizure disorder (Multi)    Paroxysmal atrial fibrillation (Multi)    CAD  (coronary artery disease)    Fall    Coarse tremors    DIA (acute kidney injury) (CMS-[facility])    Paroxysmal atrial fibrillation on Eliquis  Chronic diastolic heart failure  Coronary artery disease, calcium score 370, (4/2022)  Hypertension  Chronic kidney disease stage III  Bradycardia  Alcohol abuse     3/19: As above, patient presenting with past medical history of chronic renal failure stage III, hypertension, coronary artery disease, diastolic heart failure, hyperlipidemia, paroxysmal atrial fibrillation on Eliquis, alcohol abuse, bilateral hip replacement, bradycardia, prostate cancer s/p radiation treatment, gastric sleeve presenting to the ER after a fall, syncopal episode, and 1.5 weeks of tremors.  Patient also reported feeling fatigued for the past several months.  New labs this morning show a sodium 138, potassium 4.6, BUN 27, improving creatinine 2.03, GFR 35, hemoglobin 13.1, hematocrit 41.4.  Blood pressure reviewed this morning 109/59, heart rate 44-45, 97% on room air. Patient denies prior dizziness, lightheadedness, palpitations, chest pain, shortness of breath, or feelings of fast heart rate.  In the outpatient setting patient is on guideline directed therapy with metoprolol tartrate 50 mg twice daily, losartan 50 mg daily, rosuvastatin 40 mg daily, and amiodarone 200 mg daily for rhythm control of atrial fibrillation, and Eliquis 5 mg twice daily for stroke risk reduction in the setting of atrial fibrillation.  Patient has a history of alcohol abuse, he drinks at least half a gallon of Eboni each day.  Patient has presented to the ER for alcohol intoxication, detox, 12/6/24, and alcoholic induced seizure.  Per patient he has 100-day sober from alcohol.  Cardiology was consulted due to bradycardia. Patient is unsure when his metoprolol was increased to twice a day.  Patient's metoprolol, was held due to bradycardia, and losartan due to acute kidney injury on chronic kidney disease.  On exam patient  does not appear to be fluid overloaded, rhythm is regular, rate bradycardic.  On telemetry patient has remained bradycardic with rates predominantly in the mid 40s.  Patient was given 5 mg of amlodipine this morning due to elevated blood pressure 160/90, again on patient's arrival to the ER he was mildly hypotensive, 95/55.  Bradycardia for this patient's likely caused by increased dose of metoprolol which has been discontinued.  Echocardiogram was ordered.  We will continue to follow this patient's blood pressure, and heart rates closely.     3/20: Yesterday after given a dose of amlodipine in the morning patient's blood pressure dropped significantly.  Given IV fluid boluses but blood pressure remained in the 70 systolic range thus was transferred to the ICU for IV pressor agents.  Blood pressure has improved over the last 12 hours and currently 150/90.  We had stopped both the metoprolol and the clonidine yesterday.  Adding an antihypertensive with no rate modulating properties would be reasonable.  Will thus add amlodipine at 2.5 mg daily.  Follow blood pressures.  Okay to transfer out of the ICU back to a monitored unit.  Also discontinued the patient's amiodarone given the symptomatic bradycardia.  Will place consultation with Dr. Kiran for recommendations of whether we should resume amiodarone therapy, would pacemaker be recommended or not, or other recommendations in treatment of the atrial fibrillation.  Continue the apixaban for stroke prevention.  Echocardiogram study has been ordered and will be reviewed today.  Serum creatinine has decreased down to 1.8 on morning laboratory studies.  Overall clinically improved.     3/21: Patient's blood pressures have increased from yesterday, currently 170/92, after being given amlodipine 2.5 mg. On telemetry patient is in sinus rhythm with rates predominantly in the 60-70's, with possible 3 beat run of nonsustained V. Tach this morning, telemetry of poor quality  due to artifact.  Labs reviewed show sodium 140, potassium 4.2, improving creatinine of 1.5, GFR 51, hemoglobin 13.0, hematocrit 40.2.  Echocardiogram was completed yesterday showing an estimated ejection fraction of 60-65%, normal left ventricular systolic function, nd mild left atrial dilation.  Appreciate consult from electrophysiology, recommend continuing to hold amiodarone, as it is not ideal given this patient's age, recommended using low-dose beta-blocker 12.5 mg of metoprolol twice a day for now without arrhythmic medication on board, as it will take several weeks-1 month for effects of amiodarone to no longer be seen.  Pacemaker was not recommended at this time.  Patient's creatinine has continued to improve, very mildly elevated from baseline, will continue to hold losartan at this time.  Patient's blood pressure has remained hypertensive today, patient will be given additional dose of amlodipine 2.5 mg, and begin 5 mg tomorrow.  We agree with recommendations from electrophysiology and will have the patient begin 12.5 mg of metoprolol twice daily. Patient was discussed with Dr. Weiner. We will continue to follow this patient's blood pressure and rates closely.     3/22: Patient continues to improve on a daily basis.  Patient now in a sinus rhythm heart rates in the 60 to 70 bpm range.  Reviewed recommendations by Dr. Bowden who suggested eliminating amiodarone altogether and treating with simple beta-blocker therapy.  We started the patient on metoprolol 12.5 mg twice daily and I will titrate up to 25 mg twice daily today.  Blood pressure is mildly elevated will increase amlodipine from 5 mg to 10 mg daily.  Serum creatinine 1.3 on morning laboratory studies today.  With overall improved clinical condition would increase activity level and begin discharge planning.  Believe we should be ready for discharge in next 1 to 2 days.     3/23: Cardiac wise stable, remains in a normal sinus rhythm.  As noted  previously we are using simple beta-blocker for suppression of the atrial fibrillation and Eliquis for stroke prevention.  Blood pressure remains mildly elevated and I will add hydralazine 5 mg twice daily as additional antihypertensive agent.  Serum creatinine 1.36 on morning laboratory studies.  Clinically the patient continues to improve and would consider discharge home in the next 1 to 2 days.       I spent 18 minutes in the professional and overall care of this patient.      Casey Weiner, DO

## 2025-03-23 NOTE — NURSING NOTE
Assumed care of this pt. Pt is sitting in chair, breathing even and unlabored on RA. NAD, pt denies needs at this time. Call light and commonly used items in reach. Continuing to monitor

## 2025-03-24 ENCOUNTER — HOME HEALTH ADMISSION (OUTPATIENT)
Dept: HOME HEALTH SERVICES | Facility: HOME HEALTH | Age: 65
End: 2025-03-24
Payer: MEDICARE

## 2025-03-24 ENCOUNTER — DOCUMENTATION (OUTPATIENT)
Dept: HOME HEALTH SERVICES | Facility: HOME HEALTH | Age: 65
End: 2025-03-24
Payer: MEDICARE

## 2025-03-24 ENCOUNTER — PHARMACY VISIT (OUTPATIENT)
Dept: PHARMACY | Facility: CLINIC | Age: 65
End: 2025-03-24
Payer: COMMERCIAL

## 2025-03-24 VITALS
DIASTOLIC BLOOD PRESSURE: 80 MMHG | RESPIRATION RATE: 16 BRPM | WEIGHT: 251.1 LBS | TEMPERATURE: 98.2 F | HEIGHT: 68 IN | OXYGEN SATURATION: 100 % | BODY MASS INDEX: 38.06 KG/M2 | SYSTOLIC BLOOD PRESSURE: 120 MMHG | HEART RATE: 63 BPM

## 2025-03-24 LAB
ALBUMIN SERPL BCP-MCNC: 3.6 G/DL (ref 3.4–5)
ALP SERPL-CCNC: 82 U/L (ref 33–136)
ALT SERPL W P-5'-P-CCNC: 39 U/L (ref 10–52)
ANION GAP SERPL CALCULATED.3IONS-SCNC: 9 MMOL/L (ref 10–20)
AST SERPL W P-5'-P-CCNC: 34 U/L (ref 9–39)
BASOPHILS # BLD AUTO: 0.04 X10*3/UL (ref 0–0.1)
BASOPHILS NFR BLD AUTO: 0.6 %
BILIRUB SERPL-MCNC: 0.4 MG/DL (ref 0–1.2)
BUN SERPL-MCNC: 23 MG/DL (ref 6–23)
CALCIUM SERPL-MCNC: 9.1 MG/DL (ref 8.6–10.3)
CHLORIDE SERPL-SCNC: 106 MMOL/L (ref 98–107)
CO2 SERPL-SCNC: 29 MMOL/L (ref 21–32)
CREAT SERPL-MCNC: 1.48 MG/DL (ref 0.5–1.3)
EGFRCR SERPLBLD CKD-EPI 2021: 52 ML/MIN/1.73M*2
EOSINOPHIL # BLD AUTO: 0.43 X10*3/UL (ref 0–0.7)
EOSINOPHIL NFR BLD AUTO: 6.4 %
ERYTHROCYTE [DISTWIDTH] IN BLOOD BY AUTOMATED COUNT: 13.4 % (ref 11.5–14.5)
GLUCOSE SERPL-MCNC: 87 MG/DL (ref 74–99)
HCT VFR BLD AUTO: 42.2 % (ref 41–52)
HGB BLD-MCNC: 13.4 G/DL (ref 13.5–17.5)
IMM GRANULOCYTES # BLD AUTO: 0.03 X10*3/UL (ref 0–0.7)
IMM GRANULOCYTES NFR BLD AUTO: 0.4 % (ref 0–0.9)
LYMPHOCYTES # BLD AUTO: 1.91 X10*3/UL (ref 1.2–4.8)
LYMPHOCYTES NFR BLD AUTO: 28.4 %
MAGNESIUM SERPL-MCNC: 1.87 MG/DL (ref 1.6–2.4)
MCH RBC QN AUTO: 30.1 PG (ref 26–34)
MCHC RBC AUTO-ENTMCNC: 31.8 G/DL (ref 32–36)
MCV RBC AUTO: 95 FL (ref 80–100)
MONOCYTES # BLD AUTO: 0.64 X10*3/UL (ref 0.1–1)
MONOCYTES NFR BLD AUTO: 9.5 %
NEUTROPHILS # BLD AUTO: 3.67 X10*3/UL (ref 1.2–7.7)
NEUTROPHILS NFR BLD AUTO: 54.7 %
NRBC BLD-RTO: 0 /100 WBCS (ref 0–0)
PHOSPHATE SERPL-MCNC: 3.4 MG/DL (ref 2.5–4.9)
PLATELET # BLD AUTO: 253 X10*3/UL (ref 150–450)
POTASSIUM SERPL-SCNC: 4.7 MMOL/L (ref 3.5–5.3)
PROT SERPL-MCNC: 6.6 G/DL (ref 6.4–8.2)
RBC # BLD AUTO: 4.45 X10*6/UL (ref 4.5–5.9)
SODIUM SERPL-SCNC: 139 MMOL/L (ref 136–145)
WBC # BLD AUTO: 6.7 X10*3/UL (ref 4.4–11.3)

## 2025-03-24 PROCEDURE — 2500000002 HC RX 250 W HCPCS SELF ADMINISTERED DRUGS (ALT 637 FOR MEDICARE OP, ALT 636 FOR OP/ED)

## 2025-03-24 PROCEDURE — RXMED WILLOW AMBULATORY MEDICATION CHARGE

## 2025-03-24 PROCEDURE — 83735 ASSAY OF MAGNESIUM: CPT

## 2025-03-24 PROCEDURE — 2500000001 HC RX 250 WO HCPCS SELF ADMINISTERED DRUGS (ALT 637 FOR MEDICARE OP): Performed by: INTERNAL MEDICINE

## 2025-03-24 PROCEDURE — 99239 HOSP IP/OBS DSCHRG MGMT >30: CPT | Performed by: INTERNAL MEDICINE

## 2025-03-24 PROCEDURE — 99232 SBSQ HOSP IP/OBS MODERATE 35: CPT | Performed by: INTERNAL MEDICINE

## 2025-03-24 PROCEDURE — 2500000004 HC RX 250 GENERAL PHARMACY W/ HCPCS (ALT 636 FOR OP/ED)

## 2025-03-24 PROCEDURE — 2500000001 HC RX 250 WO HCPCS SELF ADMINISTERED DRUGS (ALT 637 FOR MEDICARE OP)

## 2025-03-24 PROCEDURE — 36415 COLL VENOUS BLD VENIPUNCTURE: CPT

## 2025-03-24 PROCEDURE — 84100 ASSAY OF PHOSPHORUS: CPT

## 2025-03-24 PROCEDURE — 85025 COMPLETE CBC W/AUTO DIFF WBC: CPT

## 2025-03-24 PROCEDURE — 80053 COMPREHEN METABOLIC PANEL: CPT

## 2025-03-24 RX ORDER — CLONIDINE HYDROCHLORIDE 0.1 MG/1
0.1 TABLET ORAL
Status: DISCONTINUED | OUTPATIENT
Start: 2025-03-24 | End: 2025-03-24 | Stop reason: HOSPADM

## 2025-03-24 RX ORDER — CALCIUM CARBONATE 200(500)MG
500 TABLET,CHEWABLE ORAL 4 TIMES DAILY PRN
Start: 2025-03-24

## 2025-03-24 RX ORDER — METOPROLOL TARTRATE 25 MG/1
25 TABLET, FILM COATED ORAL 2 TIMES DAILY
Qty: 60 TABLET | Refills: 0 | Status: SHIPPED | OUTPATIENT
Start: 2025-03-24 | End: 2025-03-29 | Stop reason: SDUPTHER

## 2025-03-24 RX ORDER — GABAPENTIN 400 MG/1
400 CAPSULE ORAL 2 TIMES DAILY
Start: 2025-03-24 | End: 2025-03-29 | Stop reason: SDUPTHER

## 2025-03-24 RX ORDER — AMLODIPINE BESYLATE 10 MG/1
10 TABLET ORAL DAILY
Qty: 30 TABLET | Refills: 0 | Status: SHIPPED | OUTPATIENT
Start: 2025-03-25

## 2025-03-24 RX ADMIN — FERROUS SULFATE TAB 325 MG (65 MG ELEMENTAL FE) 325 MG: 325 (65 FE) TAB at 10:51

## 2025-03-24 RX ADMIN — METOPROLOL TARTRATE 25 MG: 25 TABLET, FILM COATED ORAL at 10:51

## 2025-03-24 RX ADMIN — EZETIMIBE 10 MG: 10 TABLET ORAL at 10:51

## 2025-03-24 RX ADMIN — APIXABAN 5 MG: 5 TABLET, FILM COATED ORAL at 10:51

## 2025-03-24 RX ADMIN — GABAPENTIN 400 MG: 400 CAPSULE ORAL at 10:51

## 2025-03-24 RX ADMIN — DOCUSATE SODIUM 100 MG: 100 CAPSULE, LIQUID FILLED ORAL at 10:51

## 2025-03-24 RX ADMIN — ESCITALOPRAM OXALATE 20 MG: 20 TABLET ORAL at 10:51

## 2025-03-24 RX ADMIN — AMLODIPINE BESYLATE 10 MG: 10 TABLET ORAL at 10:51

## 2025-03-24 RX ADMIN — POLYETHYLENE GLYCOL 3350 17 G: 17 POWDER, FOR SOLUTION ORAL at 10:52

## 2025-03-24 RX ADMIN — ROSUVASTATIN 40 MG: 20 TABLET, FILM COATED ORAL at 10:51

## 2025-03-24 ASSESSMENT — PAIN SCALES - GENERAL: PAINLEVEL_OUTOF10: 0 - NO PAIN

## 2025-03-24 NOTE — CARE PLAN
The patient's goals for the shift include rest    The clinical goals for the shift include stable BP and HR    Mild bradycardia at times, HR in mid-high 50s. No dizziness, or lightheadedness this shift to date. Continuing to monitor

## 2025-03-24 NOTE — HH CARE COORDINATION
Home Care received a Referral for Nursing, Physical Therapy, and Occupational Therapy. We have processed the referral for a Start of Care on 03/25-03/26.     If you have any questions or concerns, please feel free to contact us at [phone]. Follow the prompts, enter your five digit zip code, and you will be directed to your care team on [facility].

## 2025-03-24 NOTE — CARE PLAN
The patient's goals for the shift include rest    The clinical goals for the shift include stable BP and HR    Over the shift, the patient did not make progress toward the following goals. Barriers to progression include vitals. Recommendations to address these barriers include education.

## 2025-03-24 NOTE — PROGRESS NOTES
Pt to dc home with [facility], internal referral for homecare needed.     Update: [facility].      03/24/25 1048   Discharge Planning   Expected Discharge Disposition Home H  ([facility])

## 2025-03-24 NOTE — PROGRESS NOTES
Subjective Data:   64 y.o. male on day 4 of admission presenting with Symptomatic bradycardia.     Overnight Events:    none     Objective Data:  Last Recorded Vitals:  Vitals:    03/23/25 1531 03/23/25 2100 03/23/25 2344 03/24/25 0655   BP: 105/73 134/64 137/79 138/79   BP Location: Left arm Left arm Left arm Left arm   Patient Position: Sitting Lying Lying Lying   Pulse: 60   56   Resp: 18 17 18 16   Temp: 36.4 °C (97.5 °F) 36.9 °C (98.5 °F) 36.6 °C (97.9 °F) 36.8 °C (98.2 °F)   TempSrc: Axillary Oral Oral Oral   SpO2: 97% 98% 100% 100%   Weight:       Height:           Last Labs:  CBC - 3/24/2025:  5:40 AM  6.7 13.4 253    42.2      CMP - 3/24/2025:  5:40 AM  9.1 6.6 34 --- 0.4   3.4 3.6 39 82      PTT - No results in last year.  _   _ _     TROPHS   Date/Time Value Ref Range Status   03/20/2025 04:09 AM 4 0 - 20 ng/L Final     HGBA1C   Date/Time Value Ref Range Status   09/28/2022 11:10 AM 5.4 % Final     Comment:          Diagnosis of Diabetes-Adults   Non-Diabetic: < or = 5.6%   Increased risk for developing diabetes: 5.7-6.4%   Diagnostic of diabetes: > or = 6.5%  .       Monitoring of Diabetes                Age (y)     Therapeutic Goal (%)   Adults:          >18           <7.0   Pediatrics:    13-18           <7.5                   7-12           <8.0                   0- 6            7.5-8.5   American Diabetes Association. Diabetes Care 33(S1), Jan 2010.     06/24/2022 10:35 AM 5.2 % Final     Comment:          Diagnosis of Diabetes-Adults   Non-Diabetic: < or = 5.6%   Increased risk for developing diabetes: 5.7-6.4%   Diagnostic of diabetes: > or = 6.5%  .       Monitoring of Diabetes                Age (y)     Therapeutic Goal (%)   Adults:          >18           <7.0   Pediatrics:    13-18           <7.5                   7-12           <8.0                   0- 6            7.5-8.5   American Diabetes Association. Diabetes Care 33(S1), Jan 2010.       LDLCALC   Date/Time Value Ref Range Status    02/19/2025 10:47 AM 67 mg/dL (calc) Final     Comment:     Reference range: <100     Desirable range <100 mg/dL for primary prevention;    <70 mg/dL for patients with CHD or diabetic patients   with > or = 2 CHD risk factors.     LDL-C is now calculated using the Onelia   calculation, which is a validated novel method providing   better accuracy than the Friedewald equation in the   estimation of LDL-C.   Chuy OWUSU et al. ARYA. 2013;310(81): 6607-7288   (http://[employer]/faq/YLB647)     04/23/2024 01:43 PM 70 <=99 mg/dL Final     Comment:                                 Near   Borderline      AGE      Desirable  Optimal    High     High     Very High     0-19 Y     0 - 109     ---    110-129   >/= 130     ----    20-24 Y     0 - 119     ---    120-159   >/= 160     ----      >24 Y     0 -  99   100-129  130-159   160-189     >/=190     08/09/2023 04:30 AM 95 65 - 130 MG/DL Final   04/07/2022 12:37 PM 68 65 - 130 MG/DL Final   12/30/2018 05:29  65 - 130 MG/DL Final     VLDL   Date/Time Value Ref Range Status   04/23/2024 01:43 PM 20 0 - 40 mg/dL Final   04/14/2023 11:35 AM 14 0 - 40 mg/dL Final   01/09/2023 11:34 AM 14 0 - 40 mg/dL Final   09/28/2022 11:10 AM 11 0 - 40 mg/dL Final      Last I/O:  I/O last 3 completed shifts:  In: 960 (8.4 mL/kg) [P.O.:960]  Out: 1 (0 mL/kg) [Urine:1 (0 mL/kg/hr)]  Weight: 113.9 kg     Past Cardiology Tests (Last 3 Years):  EKG:  Electrocardiogram, 12-lead PRN ACS symptoms 03/19/2025      Electrocardiogram, 12-lead PRN ACS symptoms 03/18/2025      ECG 12 lead 12/06/2024      ECG 12 Lead 04/24/2024    Echo:  Transthoracic Echo (TTE) Complete 03/20/2025    Ejection Fractions:  EF   Date/Time Value Ref Range Status   03/20/2025 10:19 AM 63 %      Cath:  No results found for this or any previous visit from the past 1095 days.    Stress Test:  Nuclear Stress Test     Cardiac Imaging:  No results found for this or any previous visit from the past 1095  days.      Inpatient Medications:  Scheduled medications   Medication Dose Route Frequency    [Held by provider] amiodarone  200 mg oral Daily    amLODIPine  10 mg oral Daily    apixaban  5 mg oral q12h    aspirin  81 mg oral Nightly    [Held by provider] cloNIDine  0.1 mg oral TID    docusate sodium  100 mg oral BID    escitalopram  20 mg oral Daily    ezetimibe  10 mg oral Daily    ferrous sulfate  1 tablet oral Daily    gabapentin  400 mg oral BID    metoprolol tartrate  25 mg oral BID    mirtazapine  45 mg oral Nightly    oxygen   inhalation Nightly    polyethylene glycol  17 g oral Daily    rosuvastatin  40 mg oral Daily     PRN medications   Medication    acetaminophen    Or    acetaminophen    Or    acetaminophen    calcium carbonate    melatonin    ondansetron    Or    ondansetron     Continuous Medications   Medication Dose Last Rate       Physical Exam:  Neck:  Normal jugular venous pressure,   Lungs:  Clear to auscultation without wheezing   Heart:  Regular rate and rhythm normal S1 and S2, no murmurs gallops rubs or clicks, PMI normal,  Abdomen:  Soft, good bowel sounds, nontender,  Extremities:  without pretibial edema  Neurological:  No focal sensory or motor deficits     Assessment/Plan   DIA (acute kidney injury) (CMS-[facility])       Paroxysmal atrial fibrillation on Eliquis  Chronic diastolic heart failure  Coronary artery disease, calcium score 370, (4/2022)  Hypertension  Chronic kidney disease stage III  Bradycardia  Alcohol abuse     3/19: As above, patient presenting with past medical history of chronic renal failure stage III, hypertension, coronary artery disease, diastolic heart failure, hyperlipidemia, paroxysmal atrial fibrillation on Eliquis, alcohol abuse, bilateral hip replacement, bradycardia, prostate cancer s/p radiation treatment, gastric sleeve presenting to the ER after a fall, syncopal episode, and 1.5 weeks of tremors.  Patient also reported feeling fatigued for the past several  months.  New labs this morning show a sodium 138, potassium 4.6, BUN 27, improving creatinine 2.03, GFR 35, hemoglobin 13.1, hematocrit 41.4.  Blood pressure reviewed this morning 109/59, heart rate 44-45, 97% on room air. Patient denies prior dizziness, lightheadedness, palpitations, chest pain, shortness of breath, or feelings of fast heart rate.  In the outpatient setting patient is on guideline directed therapy with metoprolol tartrate 50 mg twice daily, losartan 50 mg daily, rosuvastatin 40 mg daily, and amiodarone 200 mg daily for rhythm control of atrial fibrillation, and Eliquis 5 mg twice daily for stroke risk reduction in the setting of atrial fibrillation.  Patient has a history of alcohol abuse, he drinks at least half a gallon of Eboni each day.  Patient has presented to the ER for alcohol intoxication, detox, 12/6/24, and alcoholic induced seizure.  Per patient he has 100-day sober from alcohol.  Cardiology was consulted due to bradycardia. Patient is unsure when his metoprolol was increased to twice a day.  Patient's metoprolol, was held due to bradycardia, and losartan due to acute kidney injury on chronic kidney disease.  On exam patient does not appear to be fluid overloaded, rhythm is regular, rate bradycardic.  On telemetry patient has remained bradycardic with rates predominantly in the mid 40s.  Patient was given 5 mg of amlodipine this morning due to elevated blood pressure 160/90, again on patient's arrival to the ER he was mildly hypotensive, 95/55.  Bradycardia for this patient's likely caused by increased dose of metoprolol which has been discontinued.  Echocardiogram was ordered.  We will continue to follow this patient's blood pressure, and heart rates closely.     3/20: Yesterday after given a dose of amlodipine in the morning patient's blood pressure dropped significantly.  Given IV fluid boluses but blood pressure remained in the 70 systolic range thus was transferred to the ICU  for IV pressor agents.  Blood pressure has improved over the last 12 hours and currently 150/90.  We had stopped both the metoprolol and the clonidine yesterday.  Adding an antihypertensive with no rate modulating properties would be reasonable.  Will thus add amlodipine at 2.5 mg daily.  Follow blood pressures.  Okay to transfer out of the ICU back to a monitored unit.  Also discontinued the patient's amiodarone given the symptomatic bradycardia.  Will place consultation with Dr. Kiran for recommendations of whether we should resume amiodarone therapy, would pacemaker be recommended or not, or other recommendations in treatment of the atrial fibrillation.  Continue the apixaban for stroke prevention.  Echocardiogram study has been ordered and will be reviewed today.  Serum creatinine has decreased down to 1.8 on morning laboratory studies.  Overall clinically improved.     3/21: Patient's blood pressures have increased from yesterday, currently 170/92, after being given amlodipine 2.5 mg. On telemetry patient is in sinus rhythm with rates predominantly in the 60-70's, with possible 3 beat run of nonsustained V. Tach this morning, telemetry of poor quality due to artifact.  Labs reviewed show sodium 140, potassium 4.2, improving creatinine of 1.5, GFR 51, hemoglobin 13.0, hematocrit 40.2.  Echocardiogram was completed yesterday showing an estimated ejection fraction of 60-65%, normal left ventricular systolic function, nd mild left atrial dilation.  Appreciate consult from electrophysiology, recommend continuing to hold amiodarone, as it is not ideal given this patient's age, recommended using low-dose beta-blocker 12.5 mg of metoprolol twice a day for now without arrhythmic medication on board, as it will take several weeks-1 month for effects of amiodarone to no longer be seen.  Pacemaker was not recommended at this time.  Patient's creatinine has continued to improve, very mildly elevated from baseline, will  continue to hold losartan at this time.  Patient's blood pressure has remained hypertensive today, patient will be given additional dose of amlodipine 2.5 mg, and begin 5 mg tomorrow.  We agree with recommendations from electrophysiology and will have the patient begin 12.5 mg of metoprolol twice daily. Patient was discussed with Dr. Weiner. We will continue to follow this patient's blood pressure and rates closely.     3/22: Patient continues to improve on a daily basis.  Patient now in a sinus rhythm heart rates in the 60 to 70 bpm range.  Reviewed recommendations by Dr. Bowden who suggested eliminating amiodarone altogether and treating with simple beta-blocker therapy.  We started the patient on metoprolol 12.5 mg twice daily and I will titrate up to 25 mg twice daily today.  Blood pressure is mildly elevated will increase amlodipine from 5 mg to 10 mg daily.  Serum creatinine 1.3 on morning laboratory studies today.  With overall improved clinical condition would increase activity level and begin discharge planning.  Believe we should be ready for discharge in next 1 to 2 days.    3/24: Resting comfortably with discernible blood pressure in the setting of the labile hypertension.  Blood pressure has ranged from 99/69 to 173/110 in the last 24 hours with recent blood pressures this morning 138/79 with a pulse ranging of 56-70.  I am suggesting that his daily home blood pressure regimen include metoprolol twice daily and amlodipine daily, and because of his labile blood pressure to avoid hypotension we will allow some mild permissive hypertension in the 140s systolic.  If his systolic blood pressures in the 150s or higher he can use clonidine 0.1 mg twice daily on an as-needed basis.  He can be discharged home from a cardiac standpoint to follow-up with Dr. Weiner          Code Status:  Full Code          Thomas Gale DO

## 2025-03-25 NOTE — PROGRESS NOTES
Dilshad Freeman is a 64 y.o. male on day 6 of admission presenting with Symptomatic bradycardia.    Subjective   Blood pressure is doing better heart rate is doing better.  No more tremors       Objective     Physical Exam  Vitals reviewed.   Constitutional:       Appearance: Normal appearance. He is obese.   HENT:      Head: Normocephalic and atraumatic.      Right Ear: Tympanic membrane, ear canal and external ear normal.      Left Ear: Tympanic membrane, ear canal and external ear normal.      Nose: Nose normal.      Mouth/Throat:      Pharynx: Oropharynx is clear.   Eyes:      Extraocular Movements: Extraocular movements intact.      Conjunctiva/sclera: Conjunctivae normal.      Pupils: Pupils are equal, round, and reactive to light.   Cardiovascular:      Rate and Rhythm: Normal rate and regular rhythm.      Pulses: Normal pulses.      Heart sounds: Normal heart sounds.   Pulmonary:      Effort: Pulmonary effort is normal.      Breath sounds: Normal breath sounds.   Abdominal:      General: Abdomen is flat. Bowel sounds are normal.      Palpations: Abdomen is soft.   Musculoskeletal:      Cervical back: Normal range of motion and neck supple.   Skin:     General: Skin is warm and dry.   Neurological:      General: No focal deficit present.      Mental Status: He is alert and oriented to person, place, and time.   Psychiatric:         Mood and Affect: Mood normal.           Intake/Output last 3 Shifts:  I/O last 3 completed shifts:  In: 1320 (11.6 mL/kg) [P.O.:1320]  Out: 0 (0 mL/kg)   Weight: 113.9 kg     Relevant Results                   Results for orders placed or performed during the hospital encounter of 03/18/25 (from the past 24 hours)   CBC and Auto Differential   Result Value Ref Range    WBC 6.7 4.4 - 11.3 x10*3/uL    nRBC 0.0 0.0 - 0.0 /100 WBCs    RBC 4.45 (L) 4.50 - 5.90 x10*6/uL    Hemoglobin 13.4 (L) 13.5 - 17.5 g/dL    Hematocrit 42.2 41.0 - 52.0 %    MCV 95 80 - 100 fL    MCH 30.1 26.0 - 34.0  pg    MCHC 31.8 (L) 32.0 - 36.0 g/dL    RDW 13.4 11.5 - 14.5 %    Platelets 253 150 - 450 x10*3/uL    Neutrophils % 54.7 40.0 - 80.0 %    Immature Granulocytes %, Automated 0.4 0.0 - 0.9 %    Lymphocytes % 28.4 13.0 - 44.0 %    Monocytes % 9.5 2.0 - 10.0 %    Eosinophils % 6.4 0.0 - 6.0 %    Basophils % 0.6 0.0 - 2.0 %    Neutrophils Absolute 3.67 1.20 - 7.70 x10*3/uL    Immature Granulocytes Absolute, Automated 0.03 0.00 - 0.70 x10*3/uL    Lymphocytes Absolute 1.91 1.20 - 4.80 x10*3/uL    Monocytes Absolute 0.64 0.10 - 1.00 x10*3/uL    Eosinophils Absolute 0.43 0.00 - 0.70 x10*3/uL    Basophils Absolute 0.04 0.00 - 0.10 x10*3/uL   Comprehensive Metabolic Panel   Result Value Ref Range    Glucose 87 74 - 99 mg/dL    Sodium 139 136 - 145 mmol/L    Potassium 4.7 3.5 - 5.3 mmol/L    Chloride 106 98 - 107 mmol/L    Bicarbonate 29 21 - 32 mmol/L    Anion Gap 9 (L) 10 - 20 mmol/L    Urea Nitrogen 23 6 - 23 mg/dL    Creatinine 1.48 (H) 0.50 - 1.30 mg/dL    eGFR 52 (L) >60 mL/min/1.73m*2    Calcium 9.1 8.6 - 10.3 mg/dL    Albumin 3.6 3.4 - 5.0 g/dL    Alkaline Phosphatase 82 33 - 136 U/L    Total Protein 6.6 6.4 - 8.2 g/dL    AST 34 9 - 39 U/L    Bilirubin, Total 0.4 0.0 - 1.2 mg/dL    ALT 39 10 - 52 U/L   Magnesium   Result Value Ref Range    Magnesium 1.87 1.60 - 2.40 mg/dL   Phosphorus   Result Value Ref Range    Phosphorus 3.4 2.5 - 4.9 mg/dL     *Note: Due to a large number of results and/or encounters for the requested time period, some results have not been displayed. A complete set of results can be found in Results Review.     No results found.                 Assessment/Plan   Assessment & Plan  Symptomatic bradycardia    Fall    Coarse tremors    Anxiety    Carcinoma of prostate (Multi)    CAD (coronary artery disease)    Diabetes mellitus without complication (Multi)    Dyslipidemia    Hypercholesterolemia    Morbid obesity (Multi)    Paroxysmal atrial fibrillation (Multi)    Lumbar postlaminectomy  syndrome    Obstructive sleep apnea, adult    Seizure disorder (Multi)    Bariatric surgery status    DIA (acute kidney injury) (CMS-HCC)    Acute kidney injury resolved  Potassium normal  Ultrasound kidney normal  Bradycardia improving off the  amiodarone  Dr. Kiran's input noted  No plans for pacemaker  Patient's blood pressure is slowly improving  Cardiology added hydralazine  Cardiology is managing the blood pressure medications  Tremors better with Neurontin  Plan for home health care upon discharge  Discharge papers done Home health care ordered         I spent  minutes in the professional and overall care of this patient.      Debra Alcantar MD

## 2025-03-25 NOTE — DISCHARGE SUMMARY
Discharge Diagnosis  Symptomatic bradycardia    Issues Requiring Follow-Up  Hypertension  Acute kidney injury  Paroxysmal A-fib    Test Results Pending At Discharge  Pending Labs       No current pending labs.            Hospital Course   Dilshad Freeman is a 64 y.o. male presenting with fall.  Patient says he is feeling dizzy for 1-1/2-week.  Having tremors for last 1 and half week.  Today his legs gave out and he fell.  Did not pass out did not hit the head.  Patient has been 100-day sober from alcohol.  In the ER found to be bradycardic.  Patient's metoprolol dose was recently increased to 100 mg twice daily  Patient has history of hypertension, high cholesterol, paroxysmal A-fib, alcohol abuse, bilateral hip replacement, lumbar surgery, prostate cancer s/p radiation treatment, gastric sleeve surgery.  He lives alone.  Denies smoking  Patient had medications where he adjusted.  Amiodarone stopped.  Patient had hypertension Norvasc given and patient's blood pressure dropped into the going to the ICU.  Did not require any pressors discharged back to the regular floor.  Slowly blood pressure medications reintroduced.  Heart rate was stable.  Acute kidney injury resolved with IV fluids    Pertinent Physical Exam At Time of Discharge  Physical Exam  Vitals reviewed.   Constitutional:       Appearance: Normal appearance.   HENT:      Head: Normocephalic and atraumatic.      Right Ear: Tympanic membrane, ear canal and external ear normal.      Left Ear: Tympanic membrane, ear canal and external ear normal.      Nose: Nose normal.      Mouth/Throat:      Pharynx: Oropharynx is clear.   Eyes:      Extraocular Movements: Extraocular movements intact.      Conjunctiva/sclera: Conjunctivae normal.      Pupils: Pupils are equal, round, and reactive to light.   Cardiovascular:      Rate and Rhythm: Normal rate and regular rhythm.      Pulses: Normal pulses.      Heart sounds: Normal heart sounds.   Pulmonary:      Effort:  Pulmonary effort is normal.      Breath sounds: Normal breath sounds.   Abdominal:      General: Abdomen is flat. Bowel sounds are normal.      Palpations: Abdomen is soft.   Musculoskeletal:      Cervical back: Normal range of motion and neck supple.   Skin:     General: Skin is warm and dry.   Neurological:      General: No focal deficit present.      Mental Status: He is alert and oriented to person, place, and time.   Psychiatric:         Mood and Affect: Mood normal.         Home Medications     Medication List      START taking these medications     amLODIPine 10 mg tablet; Commonly known as: Norvasc; Take 1 tablet (10   mg) by mouth once daily.; Start taking on: March 25, 2025   calcium carbonate 500 mg (200 mg elemental) chewable tablet; Commonly   known as: Tums; Chew 1 tablet (500 mg) 4 times a day as needed for   indigestion or heartburn.     CHANGE how you take these medications     metoprolol tartrate 25 mg tablet; Commonly known as: Lopressor; Take 1   tablet (25 mg) by mouth 2 times a day.; What changed: medication strength,   how much to take     CONTINUE taking these medications     aspirin 81 mg chewable tablet   Eliquis 5 mg tablet; Generic drug: apixaban; TAKE 1 TABLET BY MOUTH   EVERY 12 HOURS   escitalopram 20 mg tablet; Commonly known as: Lexapro   ezetimibe 10 mg tablet; Commonly known as: Zetia; TAKE 1 TABLET BY MOUTH   EVERY DAY   ferrous sulfate 324 mg (65 mg elemental iron) EC tablet (delayed   release); Take 1 tablet (65 mg) by mouth once daily.   gabapentin 400 mg capsule; Commonly known as: Neurontin; Take 1 capsule   (400 mg) by mouth 2 times a day.   losartan 50 mg tablet; Commonly known as: Cozaar; Take 1 tablet (50 mg)   by mouth once daily.   mirtazapine 45 mg tablet; Commonly known as: Remeron; Take 1 tablet (45   mg) by mouth once daily at bedtime.   naltrexone 50 mg tablet; Commonly known as: Depade; Take 1 tablet (50   mg) by mouth once daily.   rosuvastatin 40 mg tablet;  Commonly known as: Crestor; Take 1 tablet (40   mg) by mouth once daily.   solifenacin 10 mg tablet; Commonly known as: Vesicare; Take 1 tablet (10   mg) by mouth once daily. Swallow tablet whole; do not crush, chew, or   split.     STOP taking these medications     amiodarone 200 mg tablet; Commonly known as: Pacerone   cloNIDine 0.1 mg tablet; Commonly known as: Catapres       Outpatient Follow-Up  Future Appointments   Date Time Provider Department Center   3/26/2025 To Be Determined Radha Sandoval RN [facility]   3/27/2025 To Be Determined Elmo Casper, KAMRON [facility]   3/28/2025 To Be Determined Maki Merritt OT [facility]   4/4/2025 11:30 AM Shae Araujo PA-C [id number]   4/10/2025  8:55 AM Fide Shields MD [id number]   4/18/2025 11:20 AM Shy Segura, PharmD [id number]       Debra Alcantar MD

## 2025-03-26 ENCOUNTER — HOME CARE VISIT (OUTPATIENT)
Dept: HOME HEALTH SERVICES | Facility: HOME HEALTH | Age: 65
End: 2025-03-26
Payer: MEDICARE

## 2025-03-26 VITALS
SYSTOLIC BLOOD PRESSURE: 102 MMHG | RESPIRATION RATE: 12 BRPM | OXYGEN SATURATION: 98 % | HEART RATE: 56 BPM | DIASTOLIC BLOOD PRESSURE: 64 MMHG | TEMPERATURE: 98.3 F

## 2025-03-26 PROCEDURE — 169592 NO-PAY CLAIM PROCEDURE

## 2025-03-26 PROCEDURE — G0299 HHS/HOSPICE OF RN EA 15 MIN: HCPCS | Mod: HHH

## 2025-03-26 ASSESSMENT — ENCOUNTER SYMPTOMS
STOOL FREQUENCY: DAILY
OCCASIONAL FEELINGS OF UNSTEADINESS: 0
CHANGE IN APPETITE: UNCHANGED
LOSS OF SENSATION IN FEET: 0
DENIES PAIN: 1
SHORTNESS OF BREATH: 1
PERSON REPORTING PAIN: PATIENT
LAST BOWEL MOVEMENT: 67283
DYSPNEA ACTIVITY LEVEL: AFTER AMBULATING 10 - 20 FT
APPETITE LEVEL: GOOD
HYPERTENSION: 1
BOWEL PATTERN NORMAL: 1
MUSCLE WEAKNESS: 1
DEPRESSION: 0
DRY SKIN: 1

## 2025-03-26 ASSESSMENT — ACTIVITIES OF DAILY LIVING (ADL)
AMBULATION ASSISTANCE: ONE PERSON
ENTERING_EXITING_HOME: MINIMUM ASSIST
OASIS_M1830: 03
AMBULATION ASSISTANCE: 1

## 2025-03-26 ASSESSMENT — LIFESTYLE VARIABLES: PERSONAL HISTORY ALCOHOL: 1

## 2025-03-27 ENCOUNTER — HOME CARE VISIT (OUTPATIENT)
Dept: HOME HEALTH SERVICES | Facility: HOME HEALTH | Age: 65
End: 2025-03-27
Payer: MEDICARE

## 2025-03-27 VITALS
OXYGEN SATURATION: 97 % | DIASTOLIC BLOOD PRESSURE: 60 MMHG | SYSTOLIC BLOOD PRESSURE: 96 MMHG | HEART RATE: 56 BPM | TEMPERATURE: 98 F

## 2025-03-27 PROCEDURE — G0151 HHCP-SERV OF PT,EA 15 MIN: HCPCS | Mod: HHH

## 2025-03-27 SDOH — HEALTH STABILITY: PHYSICAL HEALTH: EXERCISE COMMENTS: SITTING LAQ MARCHING AP  SUPINE POST PELVIC TILTS HEEL SLDIES HIP ABD

## 2025-03-27 ASSESSMENT — ENCOUNTER SYMPTOMS
HIGHEST PAIN SEVERITY IN PAST 24 HOURS: 3/10
PAIN LOCATION - PAIN SEVERITY: 0/10
PERSON REPORTING PAIN: PATIENT
DEPRESSION: 0
LOWEST PAIN SEVERITY IN PAST 24 HOURS: 0/10
PAIN LOCATION - EXACERBATING FACTORS: WALKING
PAIN LOCATION: BACK
PAIN: 1
PAIN LOCATION - RELIEVING FACTORS: SITTING
OCCASIONAL FEELINGS OF UNSTEADINESS: 1
LOSS OF SENSATION IN FEET: 0

## 2025-03-29 ENCOUNTER — OFFICE VISIT (OUTPATIENT)
Dept: PRIMARY CARE | Facility: CLINIC | Age: 65
End: 2025-03-29
Payer: MEDICARE

## 2025-03-29 VITALS
WEIGHT: 250 LBS | SYSTOLIC BLOOD PRESSURE: 128 MMHG | DIASTOLIC BLOOD PRESSURE: 74 MMHG | HEIGHT: 68 IN | BODY MASS INDEX: 37.89 KG/M2

## 2025-03-29 DIAGNOSIS — L70.0 ACNE VULGARIS: Primary | ICD-10-CM

## 2025-03-29 DIAGNOSIS — I10 PRIMARY HYPERTENSION: ICD-10-CM

## 2025-03-29 DIAGNOSIS — M79.2 NEUROPATHIC PAIN: ICD-10-CM

## 2025-03-29 DIAGNOSIS — N28.9 RENAL INSUFFICIENCY: ICD-10-CM

## 2025-03-29 DIAGNOSIS — I48.0 PAROXYSMAL ATRIAL FIBRILLATION (MULTI): ICD-10-CM

## 2025-03-29 DIAGNOSIS — R10.84 GENERALIZED ABDOMINAL PAIN: ICD-10-CM

## 2025-03-29 DIAGNOSIS — E11.9 DIABETES MELLITUS WITHOUT COMPLICATION: ICD-10-CM

## 2025-03-29 DIAGNOSIS — E78.00 HIGH CHOLESTEROL: ICD-10-CM

## 2025-03-29 DIAGNOSIS — G89.29 CHRONIC BACK PAIN, UNSPECIFIED BACK LOCATION, UNSPECIFIED BACK PAIN LATERALITY: ICD-10-CM

## 2025-03-29 DIAGNOSIS — N32.81 OVERACTIVE BLADDER: ICD-10-CM

## 2025-03-29 DIAGNOSIS — M54.9 CHRONIC BACK PAIN, UNSPECIFIED BACK LOCATION, UNSPECIFIED BACK PAIN LATERALITY: ICD-10-CM

## 2025-03-29 PROBLEM — F32.9 MAJOR DEPRESSION, SINGLE EPISODE: Status: ACTIVE | Noted: 2020-08-20

## 2025-03-29 PROBLEM — T81.9XXA COMPLICATION OF SURGICAL PROCEDURE: Status: ACTIVE | Noted: 2020-08-20

## 2025-03-29 RX ORDER — METOPROLOL TARTRATE 25 MG/1
25 TABLET, FILM COATED ORAL 2 TIMES DAILY
Qty: 180 TABLET | Refills: 0 | Status: SHIPPED | OUTPATIENT
Start: 2025-03-29 | End: 2025-06-27

## 2025-03-29 RX ORDER — GABAPENTIN 100 MG/1
100 CAPSULE ORAL 3 TIMES DAILY
Qty: 270 CAPSULE | Refills: 0 | Status: SHIPPED | OUTPATIENT
Start: 2025-03-29 | End: 2025-06-27

## 2025-03-29 RX ORDER — DOXYCYCLINE 100 MG/1
100 CAPSULE ORAL 2 TIMES DAILY
Qty: 28 CAPSULE | Refills: 0 | Status: SHIPPED | OUTPATIENT
Start: 2025-03-29 | End: 2025-04-12

## 2025-03-29 RX ORDER — SOLIFENACIN SUCCINATE 10 MG/1
10 TABLET, FILM COATED ORAL DAILY
Qty: 90 TABLET | Refills: 1 | Status: SHIPPED | OUTPATIENT
Start: 2025-03-29 | End: 2025-06-27

## 2025-03-29 RX ORDER — LOSARTAN POTASSIUM 50 MG/1
50 TABLET ORAL DAILY
Qty: 90 TABLET | Refills: 0 | Status: SHIPPED | OUTPATIENT
Start: 2025-03-29 | End: 2025-06-27

## 2025-03-29 RX ORDER — CLINDAMYCIN PHOSPHATE 1 G/10ML
300 GEL TOPICAL DAILY
Qty: 1 ML | Refills: 1 | Status: SHIPPED | OUTPATIENT
Start: 2025-03-29 | End: 2025-04-28

## 2025-03-29 RX ORDER — BENZOYL PEROXIDE 100 MG/ML
LIQUID TOPICAL 2 TIMES DAILY
Qty: 300 ML | Refills: 0 | Status: SHIPPED | OUTPATIENT
Start: 2025-03-29 | End: 2026-03-29

## 2025-03-29 NOTE — PROGRESS NOTES
"Subjective   Patient ID: Dilshad Freeman is a 64 y.o. male who presents for Follow-up and Hospital Follow-up.    This gentleman, Pete, today came here for multiple medical issues.  Overall, he is a happy person.  Appetite and weight are okay.  He was admitted in the hospital.  He came here for follow-up on various conditions.  Kidneys are better.  New problem, he has acne and facial infection, he is concerned with that.    I have personally reviewed the patient's Past Medical History, Medications, Allergies, Social History, and Family History in the EMR.    Review of Systems   All other systems reviewed and are negative.    Objective   /74   Ht 1.727 m (5' 8\")   Wt 113 kg (250 lb)   BMI 38.01 kg/m²     Physical Exam  Vitals reviewed.   Cardiovascular:      Heart sounds: Normal heart sounds, S1 normal and S2 normal. No murmur heard.     No friction rub.   Pulmonary:      Effort: Pulmonary effort is normal.      Breath sounds: Normal breath sounds and air entry.   Abdominal:      Palpations: There is no hepatomegaly, splenomegaly or mass.   Musculoskeletal:      Right lower leg: No edema.      Left lower leg: No edema.   Lymphadenopathy:      Lower Body: No right inguinal adenopathy. No left inguinal adenopathy.   Skin:     Comments: FACE:  Some adult acne present.   Neurological:      Cranial Nerves: Cranial nerves 2-12 are intact.      Sensory: No sensory deficit.      Motor: Motor function is intact.      Deep Tendon Reflexes: Reflexes are normal and symmetric.     LAB WORK:  Laboratory testing discussed.    Assessment/Plan   Problem List Items Addressed This Visit             ICD-10-CM    Abdominal pain R10.9    Relevant Medications    gabapentin (Neurontin) 100 mg capsule    Hypertension I10    Relevant Medications    losartan (Cozaar) 50 mg tablet    Other Relevant Orders    Comprehensive Metabolic Panel    Hemoglobin A1C    Magnesium    Uric Acid    Thyroid Stimulating Hormone    Renal insufficiency " N28.9    Diabetes mellitus without complication (Multi) E11.9    Relevant Medications    solifenacin (Vesicare) 10 mg tablet    Other Relevant Orders    Comprehensive Metabolic Panel    Hemoglobin A1C    Magnesium    Uric Acid    Paroxysmal atrial fibrillation (Multi) I48.0    Relevant Medications    metoprolol tartrate (Lopressor) 25 mg tablet    Other Relevant Orders    Magnesium    Uric Acid     Other Visit Diagnoses         Codes    Acne vulgaris    -  Primary L70.0    Relevant Medications    doxycycline (Vibramycin) 100 mg capsule    benzoyl peroxide (PanoxyL) 10 % external wash    clindamycin phosphate (Clindagel) 1 % gel, once daily    Overactive bladder     N32.81    High cholesterol     E78.00    Chronic back pain, unspecified back location, unspecified back pain laterality     M54.9, G89.29    Neuropathic pain     M79.2        1. Acne ______ (vulgaris).  Benzoyl peroxide face wash.  Clindamycin and doxycycline.  2. Renal insufficiency.  I will check kidney function.  3. Hypertension, stable.  4. Overactive bladder.  VESIcare.  5. High cholesterol.  Monitor.  6. Chronic back pain, on medication.  Monitor.  7. Neuropathic pain.  He is on gabapentin.  He wants to wean off.  I will lower the dose and slowly wean him off.  8. Follow up in a week after test.  Always happy to see him anytime sooner if necessary.    Scribe Attestation  By signing my name below, I, George Sibley attest that this documentation has been prepared under the direction and in the presence of Yahaira Alcantar MD.     All medical record entries made by the scribe were personally dictated by me I have reviewed the chart and agree the record accurately reflects my personal performance of his history physical examination and management

## 2025-03-30 LAB
ALBUMIN SERPL-MCNC: 4 G/DL (ref 3.6–5.1)
ALP SERPL-CCNC: 82 U/L (ref 35–144)
ALT SERPL-CCNC: 33 U/L (ref 9–46)
ANION GAP SERPL CALCULATED.4IONS-SCNC: 5 MMOL/L (CALC) (ref 7–17)
AST SERPL-CCNC: 31 U/L (ref 10–35)
BILIRUB SERPL-MCNC: 0.5 MG/DL (ref 0.2–1.2)
BUN SERPL-MCNC: 31 MG/DL (ref 7–25)
CALCIUM SERPL-MCNC: 9.6 MG/DL (ref 8.6–10.3)
CHLORIDE SERPL-SCNC: 101 MMOL/L (ref 98–110)
CO2 SERPL-SCNC: 33 MMOL/L (ref 20–32)
CREAT SERPL-MCNC: 2.57 MG/DL (ref 0.7–1.35)
EGFRCR SERPLBLD CKD-EPI 2021: 27 ML/MIN/1.73M2
EST. AVERAGE GLUCOSE BLD GHB EST-MCNC: 111 MG/DL
EST. AVERAGE GLUCOSE BLD GHB EST-SCNC: 6.2 MMOL/L
GLUCOSE SERPL-MCNC: 96 MG/DL (ref 65–99)
HBA1C MFR BLD: 5.5 % OF TOTAL HGB
MAGNESIUM SERPL-MCNC: 2.5 MG/DL (ref 1.5–2.5)
POTASSIUM SERPL-SCNC: 4.9 MMOL/L (ref 3.5–5.3)
PROT SERPL-MCNC: 6.8 G/DL (ref 6.1–8.1)
SODIUM SERPL-SCNC: 139 MMOL/L (ref 135–146)
TSH SERPL-ACNC: 0.77 MIU/L (ref 0.4–4.5)

## 2025-03-31 ENCOUNTER — HOME CARE VISIT (OUTPATIENT)
Dept: HOME HEALTH SERVICES | Facility: HOME HEALTH | Age: 65
End: 2025-03-31
Payer: MEDICARE

## 2025-03-31 VITALS
RESPIRATION RATE: 16 BRPM | OXYGEN SATURATION: 95 % | SYSTOLIC BLOOD PRESSURE: 94 MMHG | DIASTOLIC BLOOD PRESSURE: 60 MMHG | TEMPERATURE: 98.2 F | HEART RATE: 60 BPM

## 2025-03-31 VITALS — RESPIRATION RATE: 18 BRPM | OXYGEN SATURATION: 99 % | TEMPERATURE: 97.4 F | HEART RATE: 55 BPM

## 2025-03-31 PROCEDURE — G0152 HHCP-SERV OF OT,EA 15 MIN: HCPCS | Mod: HHH

## 2025-03-31 PROCEDURE — G0299 HHS/HOSPICE OF RN EA 15 MIN: HCPCS | Mod: HHH

## 2025-03-31 ASSESSMENT — ENCOUNTER SYMPTOMS
LAST BOWEL MOVEMENT: 67294
PERSON REPORTING PAIN: PATIENT
PERSON REPORTING PAIN: PATIENT
BOWEL PATTERN NORMAL: 1
DRY SKIN: 1
DENIES PAIN: 1
DEPRESSION: 0
DENIES PAIN: 1
FATIGUE: 1
APPETITE LEVEL: GOOD
SHORTNESS OF BREATH: 1
LOSS OF SENSATION IN FEET: 0
STOOL FREQUENCY: LESS THAN DAILY
DYSPNEA ACTIVITY LEVEL: AFTER AMBULATING 10 - 20 FT
LOWER EXTREMITY EDEMA: 1
OCCASIONAL FEELINGS OF UNSTEADINESS: 1
CHANGE IN APPETITE: UNCHANGED
MUSCLE WEAKNESS: 1

## 2025-03-31 ASSESSMENT — PAIN SCALES - PAIN ASSESSMENT IN ADVANCED DEMENTIA (PAINAD)
BREATHING: 0
CONSOLABILITY: 0 - NO NEED TO CONSOLE.
NEGVOCALIZATION: 0
TOTALSCORE: 0
BODYLANGUAGE: 0 - RELAXED.
FACIALEXPRESSION: 0 - SMILING OR INEXPRESSIVE.
BODYLANGUAGE: 0
FACIALEXPRESSION: 0
CONSOLABILITY: 0
NEGVOCALIZATION: 0 - NONE.

## 2025-03-31 ASSESSMENT — ACTIVITIES OF DAILY LIVING (ADL)
TOILETING: INDEPENDENT
FEEDING_WITHIN_DEFINED_LIMITS: 1
MONEY MANAGEMENT (EXPENSES/BILLS): INDEPENDENT
BATHING ASSESSED: 1
TOILETING: 1
BATHING_CURRENT_FUNCTION: INDEPENDENT
GROOMING_WITHIN_DEFINED_LIMITS: 1
AMBULATION ASSISTANCE: 1
AMBULATION ASSISTANCE: ONE PERSON
DRESSING_LB_CURRENT_FUNCTION: INDEPENDENT

## 2025-04-01 ENCOUNTER — HOME CARE VISIT (OUTPATIENT)
Dept: HOME HEALTH SERVICES | Facility: HOME HEALTH | Age: 65
End: 2025-04-01
Payer: MEDICARE

## 2025-04-01 VITALS — TEMPERATURE: 97.6 F | SYSTOLIC BLOOD PRESSURE: 126 MMHG | DIASTOLIC BLOOD PRESSURE: 90 MMHG

## 2025-04-01 DIAGNOSIS — I50.32 CHRONIC DIASTOLIC HEART FAILURE: Primary | ICD-10-CM

## 2025-04-01 DIAGNOSIS — E11.9 DIABETES MELLITUS WITHOUT COMPLICATION: ICD-10-CM

## 2025-04-01 PROCEDURE — G0151 HHCP-SERV OF PT,EA 15 MIN: HCPCS | Mod: HHH

## 2025-04-01 ASSESSMENT — ENCOUNTER SYMPTOMS: DENIES PAIN: 1

## 2025-04-03 ENCOUNTER — HOME CARE VISIT (OUTPATIENT)
Dept: HOME HEALTH SERVICES | Facility: HOME HEALTH | Age: 65
End: 2025-04-03
Payer: MEDICARE

## 2025-04-03 VITALS — SYSTOLIC BLOOD PRESSURE: 110 MMHG | DIASTOLIC BLOOD PRESSURE: 76 MMHG

## 2025-04-03 PROCEDURE — G0151 HHCP-SERV OF PT,EA 15 MIN: HCPCS | Mod: HHH

## 2025-04-03 ASSESSMENT — ENCOUNTER SYMPTOMS
LOWEST PAIN SEVERITY IN PAST 24 HOURS: 0/10
PAIN LOCATION: RIGHT KNEE
HIGHEST PAIN SEVERITY IN PAST 24 HOURS: 3/10
PAIN: 1
PAIN LOCATION - PAIN SEVERITY: 0/10
PERSON REPORTING PAIN: PATIENT

## 2025-04-04 ENCOUNTER — LAB (OUTPATIENT)
Dept: LAB | Facility: CLINIC | Age: 65
End: 2025-04-04
Payer: MEDICARE

## 2025-04-04 ENCOUNTER — OFFICE VISIT (OUTPATIENT)
Dept: HEMATOLOGY/ONCOLOGY | Facility: CLINIC | Age: 65
End: 2025-04-04
Payer: MEDICARE

## 2025-04-04 VITALS
SYSTOLIC BLOOD PRESSURE: 130 MMHG | RESPIRATION RATE: 16 BRPM | DIASTOLIC BLOOD PRESSURE: 85 MMHG | BODY MASS INDEX: 38.81 KG/M2 | TEMPERATURE: 97.5 F | WEIGHT: 247.25 LBS | HEIGHT: 67 IN | OXYGEN SATURATION: 98 % | HEART RATE: 64 BPM

## 2025-04-04 DIAGNOSIS — C61 PROSTATE CANCER (MULTI): ICD-10-CM

## 2025-04-04 DIAGNOSIS — E11.9 DIABETES MELLITUS WITHOUT COMPLICATION: ICD-10-CM

## 2025-04-04 DIAGNOSIS — D53.8 OTHER SPECIFIED NUTRITIONAL ANEMIAS: Primary | ICD-10-CM

## 2025-04-04 DIAGNOSIS — D51.3 OTHER DIETARY VITAMIN B12 DEFICIENCY ANEMIA: ICD-10-CM

## 2025-04-04 DIAGNOSIS — D53.8 OTHER SPECIFIED NUTRITIONAL ANEMIAS: ICD-10-CM

## 2025-04-04 LAB
ALBUMIN SERPL BCP-MCNC: 4.4 G/DL (ref 3.4–5)
ALP SERPL-CCNC: 87 U/L (ref 33–136)
ALT SERPL W P-5'-P-CCNC: 29 U/L (ref 10–52)
ANION GAP SERPL CALC-SCNC: 12 MMOL/L (ref 10–20)
AST SERPL W P-5'-P-CCNC: 27 U/L (ref 9–39)
BILIRUB SERPL-MCNC: 0.4 MG/DL (ref 0–1.2)
BUN SERPL-MCNC: 25 MG/DL (ref 6–23)
CALCIUM SERPL-MCNC: 9.6 MG/DL (ref 8.6–10.3)
CHLORIDE SERPL-SCNC: 102 MMOL/L (ref 98–107)
CO2 SERPL-SCNC: 29 MMOL/L (ref 21–32)
CREAT SERPL-MCNC: 1.58 MG/DL (ref 0.5–1.3)
EGFRCR SERPLBLD CKD-EPI 2021: 48 ML/MIN/1.73M*2
FERRITIN SERPL-MCNC: 243 NG/ML (ref 20–300)
GLUCOSE SERPL-MCNC: 98 MG/DL (ref 74–99)
IRON SATN MFR SERPL: 32 % (ref 25–45)
IRON SERPL-MCNC: 94 UG/DL (ref 35–150)
POTASSIUM SERPL-SCNC: 4.6 MMOL/L (ref 3.5–5.3)
PROT SERPL-MCNC: 7 G/DL (ref 6.4–8.2)
PSA SERPL-MCNC: 0.17 NG/ML
SODIUM SERPL-SCNC: 138 MMOL/L (ref 136–145)
TIBC SERPL-MCNC: 296 UG/DL (ref 240–445)
UIBC SERPL-MCNC: 202 UG/DL (ref 110–370)
URATE SERPL-MCNC: 3.7 MG/DL (ref 4–7.5)
VIT B12 SERPL-MCNC: 784 PG/ML (ref 211–911)

## 2025-04-04 PROCEDURE — 82607 VITAMIN B-12: CPT

## 2025-04-04 PROCEDURE — 83540 ASSAY OF IRON: CPT

## 2025-04-04 PROCEDURE — 84153 ASSAY OF PSA TOTAL: CPT

## 2025-04-04 PROCEDURE — 82728 ASSAY OF FERRITIN: CPT

## 2025-04-04 PROCEDURE — 84550 ASSAY OF BLOOD/URIC ACID: CPT | Performed by: INTERNAL MEDICINE

## 2025-04-04 PROCEDURE — 36415 COLL VENOUS BLD VENIPUNCTURE: CPT

## 2025-04-04 PROCEDURE — 99214 OFFICE O/P EST MOD 30 MIN: CPT | Performed by: NURSE PRACTITIONER

## 2025-04-04 PROCEDURE — 80053 COMPREHEN METABOLIC PANEL: CPT

## 2025-04-04 ASSESSMENT — ENCOUNTER SYMPTOMS
DIAPHORESIS: 0
SHORTNESS OF BREATH: 1
ABDOMINAL DISTENTION: 0
ABDOMINAL PAIN: 0
HEMOPTYSIS: 0
FATIGUE: 1
ADENOPATHY: 0
PALPITATIONS: 0
FEVER: 0
EXTREMITY WEAKNESS: 0
BLOOD IN STOOL: 0
NEUROLOGICAL NEGATIVE: 1
CHEST TIGHTNESS: 0
LEG SWELLING: 0
EYES NEGATIVE: 1
BRUISES/BLEEDS EASILY: 0
GASTROINTESTINAL NEGATIVE: 1
DIARRHEA: 0
MUSCULOSKELETAL NEGATIVE: 1
HEMATOLOGIC/LYMPHATIC NEGATIVE: 1
WHEEZING: 0
CONSTIPATION: 0
COUGH: 0
UNEXPECTED WEIGHT CHANGE: 0

## 2025-04-04 ASSESSMENT — PAIN SCALES - GENERAL: PAINLEVEL_OUTOF10: 0-NO PAIN

## 2025-04-04 NOTE — PROGRESS NOTES
Patient here for follow up visit with Shae Araujo for Dx of anemia, prostate cancer   Patient here alone, pt ambulated with walker. Falls precauitons maintainted     Medications and Allergies reviewed and reconciled this visit.    No concerns or complaints noted at this time.     Pt reports appetite is good.   Dizziness: denies   Bleeding: denies   PICA: denies   Fevers/Chills/weight loss/night sweats: denies   Pt recently hospitalized due to fall and irregular heart rate.  Pt set for EGD/ Colonoscopy next week       Follow up per  PA  request.    Pt. reports availability and use of mychart, Reviewed this is a good place to communicate with the team as well as review labs and upcoming orders.   No barriers to education noted, patient agrees to current plan and verbalized understanding using teach back method.

## 2025-04-04 NOTE — PROGRESS NOTES
Patient ID: Dilshad Freeman is a 64 y.o. male.  Referring Physician: Shae Araujo PA-C  [address]  [facility] [address]  [place] [address]  Primary Care Provider: Yahaira Alcantar MD  Visit Type: Follow Up      Subjective    HPI  1. [id number], stage II prostate carcinoma, [place] 5 + 5 = 10.      [id number] receiving radiation therapy between March 3, 2016 and May 3, 2016 with 45 Gy in 25 fractions and34.2 in 19 fractions bilaterally using IMRT.      He was treated with 32 months of an LHRH agonist completing therapy August 6, 2018.    4. Iron deficiency anemia originally seen by a hematologist back in December of 2020     CURRENT THERAPY: Surveillance.        Chief Complaint: fuv prostate   Interval History:    Patient returns today for history of prostate carcinoma, diagnosed in 2016. Patient completed all therapy in August 6, 2018. He has been on observation since that  time.   Most recent PSA 0.13 on 10/8/24    He was seen in our office in December of 2020 patient was fund to have iron deficiency anemia which was felt to be due to GI blood loss, since resolved. He did undergo EGD and colonoscopy, 1 polyp removed no signs of bleeding identified.  Repeat colonoscopy due next week.    Patient underwent laparoscopic vertical gastric sleeve gastrectomy and repair of hiatal hernia on October 19, 2022 with Dr. Shahid Ashraf.   On presentation today he has no complaints he denies any fever, chills or night sweats.  No cough, chest pain or shortness of breath.  No nausea or vomiting.  No constipation or diarrhea.  He has no difficulty with urination. No signs or symptoms of active  bleeding or unusual bruising.   He was seen in the emergency room in August for alcohol withdraw symptoms. He states he has not had drink since Jan 2024 at the last visit and today tells me he has been sober for 118 days.  He had syncope and fall admit to ER since then.  He was determined to have heart dysrythmia and treated for that.        Review of  "Systems   Constitutional:  Positive for fatigue. Negative for diaphoresis, fever and unexpected weight change.   HENT:  Negative.     Eyes: Negative.    Respiratory:  Positive for shortness of breath. Negative for chest tightness, cough, hemoptysis and wheezing.    Cardiovascular:  Negative for chest pain, leg swelling and palpitations.   Gastrointestinal: Negative.  Negative for abdominal distention, abdominal pain, blood in stool, constipation and diarrhea.   Musculoskeletal: Negative.    Skin: Negative.    Neurological: Negative.  Negative for extremity weakness.   Hematological: Negative.  Negative for adenopathy. Does not bruise/bleed easily.      Objective   BSA: 2.3 meters squared  /85 (BP Location: Right arm, Patient Position: Sitting, BP Cuff Size: Adult long)   Pulse 64   Temp 36.4 °C (97.5 °F) (Temporal)   Resp 16   Ht (S) 1.697 m (5' 6.81\")   Wt 112 kg (247 lb 3.9 oz)   SpO2 98%   BMI 38.94 kg/m²      has a past medical history of Anxiety, Atrial fibrillation (Multi), Body mass index (BMI)40.0-44.9, adult (04/26/2021), BPH (benign prostatic hyperplasia), Depression, H/O knee surgery, High cholesterol, History of back surgery, anticoagulation, Hypertension, Personal history of alcoholism (Multi), Prostate cancer (Multi), and Stress.   has a past surgical history that includes Other surgical history (07/01/2013); Other surgical history (07/01/2013); and Back surgery.  Family History   Family history unknown: Yes         Dilshad Freeman  reports that he has never smoked. He has never used smokeless tobacco.  He  reports no history of alcohol use.  He  has no history on file for drug use.    Physical Exam  Constitutional:       Appearance: Normal appearance.   Eyes:      Conjunctiva/sclera: Conjunctivae normal.      Pupils: Pupils are equal, round, and reactive to light.   Cardiovascular:      Rate and Rhythm: Normal rate and regular rhythm.      Pulses: Normal pulses.      Heart sounds: Normal " heart sounds. No murmur heard.  Pulmonary:      Effort: Pulmonary effort is normal. No respiratory distress.      Breath sounds: Normal breath sounds. No rhonchi or rales.   Abdominal:      General: There is no distension.      Palpations: There is no mass.      Tenderness: There is no abdominal tenderness.   Musculoskeletal:         General: No swelling or deformity. Normal range of motion.   Lymphadenopathy:      Cervical: No cervical adenopathy.   Skin:     Coloration: Skin is not jaundiced.      Findings: No bruising.   Neurological:      Motor: Weakness present.      Comments: Uses a walker     WBC   Date/Time Value Ref Range Status   03/24/2025 05:40 AM 6.7 4.4 - 11.3 x10*3/uL Final   03/23/2025 06:04 AM 6.4 4.4 - 11.3 x10*3/uL Final   03/22/2025 06:21 AM 6.3 4.4 - 11.3 x10*3/uL Final     WHITE BLOOD CELL COUNT   Date/Time Value Ref Range Status   02/19/2025 10:47 AM 4.5 3.8 - 10.8 Thousand/uL Final     nRBC   Date Value Ref Range Status   03/24/2025 0.0 0.0 - 0.0 /100 WBCs Final   03/23/2025 0.0 0.0 - 0.0 /100 WBCs Final   03/22/2025 0.0 0.0 - 0.0 /100 WBCs Final     RBC   Date Value Ref Range Status   03/24/2025 4.45 (L) 4.50 - 5.90 x10*6/uL Final   03/23/2025 4.53 4.50 - 5.90 x10*6/uL Final   03/22/2025 4.41 (L) 4.50 - 5.90 x10*6/uL Final     RED BLOOD CELL COUNT   Date Value Ref Range Status   02/19/2025 3.65 (L) 4.20 - 5.80 Million/uL Final     Hemoglobin   Date Value Ref Range Status   03/24/2025 13.4 (L) 13.5 - 17.5 g/dL Final   03/23/2025 13.6 13.5 - 17.5 g/dL Final   03/22/2025 13.5 13.5 - 17.5 g/dL Final     HEMOGLOBIN   Date Value Ref Range Status   02/19/2025 11.4 (L) 13.2 - 17.1 g/dL Final     Hematocrit   Date Value Ref Range Status   03/24/2025 42.2 41.0 - 52.0 % Final   03/23/2025 43.0 41.0 - 52.0 % Final   03/22/2025 41.5 41.0 - 52.0 % Final     HEMATOCRIT   Date Value Ref Range Status   02/19/2025 36.5 (L) 38.5 - 50.0 % Final     MCV   Date/Time Value Ref Range Status   03/24/2025 05:40 AM 95  80 - 100 fL Final   03/23/2025 06:04 AM 95 80 - 100 fL Final   03/22/2025 06:21 AM 94 80 - 100 fL Final   02/19/2025 10:47 .0 80.0 - 100.0 fL Final     MCH   Date/Time Value Ref Range Status   03/24/2025 05:40 AM 30.1 26.0 - 34.0 pg Final   03/23/2025 06:04 AM 30.0 26.0 - 34.0 pg Final   03/22/2025 06:21 AM 30.6 26.0 - 34.0 pg Final   02/19/2025 10:47 AM 31.2 27.0 - 33.0 pg Final     MCHC   Date/Time Value Ref Range Status   03/24/2025 05:40 AM 31.8 (L) 32.0 - 36.0 g/dL Final   03/23/2025 06:04 AM 31.6 (L) 32.0 - 36.0 g/dL Final   03/22/2025 06:21 AM 32.5 32.0 - 36.0 g/dL Final   02/19/2025 10:47 AM 31.2 (L) 32.0 - 36.0 g/dL Final     Comment:     For adults, a slight decrease in the calculated MCHC  value (in the range of 30 to 32 g/dL) is most likely  not clinically significant; however, it should be  interpreted with caution in correlation with other  red cell parameters and the patient's clinical  condition.       RDW   Date/Time Value Ref Range Status   03/24/2025 05:40 AM 13.4 11.5 - 14.5 % Final   03/23/2025 06:04 AM 13.4 11.5 - 14.5 % Final   03/22/2025 06:21 AM 13.2 11.5 - 14.5 % Final   02/19/2025 10:47 AM 13.2 11.0 - 15.0 % Final     Platelets   Date/Time Value Ref Range Status   03/24/2025 05:40  150 - 450 x10*3/uL Final   03/23/2025 06:04  150 - 450 x10*3/uL Final   03/22/2025 06:21  150 - 450 x10*3/uL Final     PLATELET COUNT   Date/Time Value Ref Range Status   02/19/2025 10:47  140 - 400 Thousand/uL Final     MPV   Date/Time Value Ref Range Status   02/19/2025 10:47 AM 9.1 7.5 - 12.5 fL Final   08/11/2023 04:50 AM 9.1 7.0 - 12.6 CU Final   08/10/2023 04:58 AM 9.3 7.0 - 12.6 CU Final   08/08/2023 01:46 PM 8.5 7.0 - 12.6 CU Final     Neutrophils %   Date/Time Value Ref Range Status   03/24/2025 05:40 AM 54.7 40.0 - 80.0 % Final   03/23/2025 06:04 AM 59.8 40.0 - 80.0 % Final   03/22/2025 06:21 AM 61.5 40.0 - 80.0 % Final     Immature Granulocytes %, Automated   Date/Time  Value Ref Range Status   03/24/2025 05:40 AM 0.4 0.0 - 0.9 % Final     Comment:     Immature Granulocyte Count (IG) includes promyelocytes, myelocytes and metamyelocytes but does not include bands. Percent differential counts (%) should be interpreted in the context of the absolute cell counts (cells/UL).   03/23/2025 06:04 AM 0.6 0.0 - 0.9 % Final     Comment:     Immature Granulocyte Count (IG) includes promyelocytes, myelocytes and metamyelocytes but does not include bands. Percent differential counts (%) should be interpreted in the context of the absolute cell counts (cells/UL).   03/22/2025 06:21 AM 0.5 0.0 - 0.9 % Final     Comment:     Immature Granulocyte Count (IG) includes promyelocytes, myelocytes and metamyelocytes but does not include bands. Percent differential counts (%) should be interpreted in the context of the absolute cell counts (cells/UL).     Lymphocytes %   Date/Time Value Ref Range Status   03/24/2025 05:40 AM 28.4 13.0 - 44.0 % Final   03/23/2025 06:04 AM 22.3 13.0 - 44.0 % Final   03/22/2025 06:21 AM 20.5 13.0 - 44.0 % Final     Monocytes %   Date/Time Value Ref Range Status   03/24/2025 05:40 AM 9.5 2.0 - 10.0 % Final   03/23/2025 06:04 AM 10.4 2.0 - 10.0 % Final   03/22/2025 06:21 AM 10.9 2.0 - 10.0 % Final     Eosinophils %   Date/Time Value Ref Range Status   03/24/2025 05:40 AM 6.4 0.0 - 6.0 % Final   03/23/2025 06:04 AM 6.4 0.0 - 6.0 % Final   03/22/2025 06:21 AM 6.0 0.0 - 6.0 % Final     Basophils %   Date/Time Value Ref Range Status   03/24/2025 05:40 AM 0.6 0.0 - 2.0 % Final   03/23/2025 06:04 AM 0.5 0.0 - 2.0 % Final   03/22/2025 06:21 AM 0.6 0.0 - 2.0 % Final     Neutrophils Absolute   Date/Time Value Ref Range Status   03/24/2025 05:40 AM 3.67 1.20 - 7.70 x10*3/uL Final     Comment:     Percent differential counts (%) should be interpreted in the context of the absolute cell counts (cells/uL).   03/23/2025 06:04 AM 3.80 1.20 - 7.70 x10*3/uL Final     Comment:     Percent  differential counts (%) should be interpreted in the context of the absolute cell counts (cells/uL).   03/22/2025 06:21 AM 3.90 1.20 - 7.70 x10*3/uL Final     Comment:     Percent differential counts (%) should be interpreted in the context of the absolute cell counts (cells/uL).     Immature Granulocytes Absolute, Automated   Date/Time Value Ref Range Status   03/24/2025 05:40 AM 0.03 0.00 - 0.70 x10*3/uL Final   03/23/2025 06:04 AM 0.04 0.00 - 0.70 x10*3/uL Final   03/22/2025 06:21 AM 0.03 0.00 - 0.70 x10*3/uL Final     Lymphocytes Absolute   Date/Time Value Ref Range Status   03/24/2025 05:40 AM 1.91 1.20 - 4.80 x10*3/uL Final   03/23/2025 06:04 AM 1.42 1.20 - 4.80 x10*3/uL Final   03/22/2025 06:21 AM 1.30 1.20 - 4.80 x10*3/uL Final     Monocytes Absolute   Date/Time Value Ref Range Status   03/24/2025 05:40 AM 0.64 0.10 - 1.00 x10*3/uL Final   03/23/2025 06:04 AM 0.66 0.10 - 1.00 x10*3/uL Final   03/22/2025 06:21 AM 0.69 0.10 - 1.00 x10*3/uL Final     Eosinophils Absolute   Date/Time Value Ref Range Status   03/24/2025 05:40 AM 0.43 0.00 - 0.70 x10*3/uL Final   03/23/2025 06:04 AM 0.41 0.00 - 0.70 x10*3/uL Final   03/22/2025 06:21 AM 0.38 0.00 - 0.70 x10*3/uL Final     Basophils Absolute   Date/Time Value Ref Range Status   03/24/2025 05:40 AM 0.04 0.00 - 0.10 x10*3/uL Final   03/23/2025 06:04 AM 0.03 0.00 - 0.10 x10*3/uL Final   03/22/2025 06:21 AM 0.04 0.00 - 0.10 x10*3/uL Final         aPTT   Date/Time Value Ref Range Status   08/09/2023 04:30 AM 32.4 22.0 - 32.5 SEC Final     Comment:     Performed at [address] [address]   04/07/2022 12:37 PM 33.2 (H) 22.0 - 32.5 SEC Final     Comment:     Performed at [address] [address]   08/13/2020 03:30 PM 28.3 22.0 - 32.5 SEC Final     Comment:     Performed at [address] [address]       Assessment/Plan    Prostate carcinoma:  - Treated on [id number]  - All therapy completed as of August 6, 2018.  -  PSA check next visit     Iron deficiency and B12 deficiency, Labs pending  Plan:    Follow-up 6 months                       Shae Araujo PA-C

## 2025-04-04 NOTE — PROGRESS NOTES
Pharmacy Post-Discharge Visit    Dilshad Freeman is a 64 y.o. male was referred to Clinical Pharmacy Team to complete a post-discharge medication optimization and monitoring visit.  The patient was referred for their No chief complaint on file..    Pt is here for First appointment.   Referring Provider: Yahaira Alcantar MD  PCP: Yahaira Alcantar MD, last visit: 3/29/25, next visit: not scheduled    Admission Date: 3/18/25  Discharge Date: 3/24/25  Discharge Diagnosis: Symptomatic Bradycardia    Subjective   HPI  PMH significant for CHF and DM (platinum referral).    Medication System Management  Patients preferred pharmacy:   [id number] - [place], [place] - [id number] [place] [address] [address] [address]  Phone: [phone] Fax: [phone]    [facility]  [address] [facility] [address]  Phone: [phone] Fax: [phone]  Adherence/Organization: No current concerns  Affordability/Accessibility: No current concerns  Adverse Effects: No current concerns    Drug Interactions  {Drug Interactions:23600}    {Platinum Disease State Management:32263}    Preventative Care  Immunizations Needed: {Immunizations:14980}  Tobacco Use: {Smoking Status:71476}      Objective   No Known Allergies  Social History     Social History Narrative    Not on file      Medication Review  Current Outpatient Medications   Medication Instructions    amLODIPine (NORVASC) 10 mg, oral, Daily    apixaban (Eliquis) 5 mg tablet TAKE 1 TABLET BY MOUTH EVERY 12 HOURS    aspirin 81 mg, Nightly    benzoyl peroxide (PanoxyL) 10 % external wash Topical, 2 times daily    calcium carbonate (TUMS) 500 mg, oral, 4 times daily PRN    clindamycin phosphate (CLINDAGEL) 300 g, topical (top), Daily    doxycycline (VIBRAMYCIN) 100 mg, oral, 2 times daily, Take with at least 8 ounces (large glass) of water, do not lie down for 30 minutes after    escitalopram (Lexapro) 20 mg tablet Take 1  "tablet (20 mg) by mouth once daily in the morning.    ezetimibe (ZETIA) 10 mg, oral, Daily    ferrous sulfate 65 mg, oral, Daily    gabapentin (NEURONTIN) 100 mg, oral, 3 times daily    losartan (COZAAR) 50 mg, oral, Daily    metoprolol tartrate (LOPRESSOR) 25 mg, oral, 2 times daily    mirtazapine (REMERON) 45 mg, Nightly    naltrexone (DEPADE) 50 mg, oral, Daily    rosuvastatin (CRESTOR) 40 mg, oral, Daily    solifenacin (VESICARE) 10 mg, oral, Daily, Swallow tablet whole; do not crush, chew, or split.      Vitals  BP Readings from Last 2 Encounters:   25 130/85   25 110/76     Labs  A1C  Lab Results   Component Value Date    HGBA1C 5.5 2025    HGBA1C 5.4 2022    HGBA1C 5.2 2022     BMP  Lab Results   Component Value Date    CALCIUM 9.6 2025     2025    K 4.6 2025    CO2 29 2025     2025    BUN 25 (H) 2025    CREATININE 1.58 (H) 2025    EGFR 48 (L) 2025     LFTs  Lab Results   Component Value Date    ALT 29 2025    AST 27 2025    ALKPHOS 87 2025    BILITOT 0.4 2025     FLP  Lab Results   Component Value Date    TRIG 62 2025    CHOL 130 2025    LDLF 96 2023    LDLCALC 67 2025    HDL 49 2025     Urine Microalbumin  No results found for: \"MICROALBCREA\"  Wt Readings from Last 3 Encounters:   25 112 kg (247 lb 3.9 oz)   25 113 kg (250 lb)   25 114 kg (251 lb 1.7 oz)      BMI Readings from Last 1 Encounters:   25 38.94 kg/m²       Assessment/Plan   Problem List Items Addressed This Visit    None      {Platinum Disease State Plan:61707}    Patient Education:  {Patient Education:89059}    Clinical Pharmacist follow-up: ***, {In-Person / Telehealth:98990} visit  {[facility] Patient Trackin}    Thank you,  Loyd Linn, PharmD  Clinical Pharmacy Specialist  [phone]    Continue all meds under the continuation of care with the referring provider and " clinical pharmacy team.  Verbal consent to manage patient's drug therapy was obtained from {patient rights:54019}. They were informed they may decline to participate or withdraw from participation in pharmacy services at any time.   needed-please re-refer if needing additional cost assistance  Patient is not followed in CCM.    Thank you,  Loyd Linn PharmD, [facility]  Clinical Pharmacy Specialist-Primary Care  [phone]    Continue all meds under the continuation of care with the referring provider and clinical pharmacy team.  Verbal consent to manage patient's drug therapy was obtained from the patient. They were informed they may decline to participate or withdraw from participation in pharmacy services at any time.

## 2025-04-05 DIAGNOSIS — I10 PRIMARY HYPERTENSION: ICD-10-CM

## 2025-04-05 DIAGNOSIS — I48.0 PAROXYSMAL ATRIAL FIBRILLATION (MULTI): ICD-10-CM

## 2025-04-05 DIAGNOSIS — E11.9 DIABETES MELLITUS WITHOUT COMPLICATION: ICD-10-CM

## 2025-04-05 RX ORDER — SOLIFENACIN SUCCINATE 10 MG/1
10 TABLET, FILM COATED ORAL DAILY
Qty: 90 TABLET | Refills: 0 | Status: SHIPPED | OUTPATIENT
Start: 2025-04-05 | End: 2025-04-07

## 2025-04-05 NOTE — DOCUMENTATION CLARIFICATION NOTE
"    PATIENT:               MICHAEL GREENE  ACCT #:                  [id number]  MRN:                       [id number]  :                       1960  ADMIT DATE:       3/18/2025 3:23 PM  DISCH DATE:        3/24/2025 2:43 PM  RESPONDING PROVIDER #:        [id number]          PROVIDER RESPONSE TEXT:    Hypotension only without shock    CDI QUERY TEXT:    Clarification        Instruction:    Based on your assessment of the patient and the clinical information, please provide the requested documentation by clicking on the appropriate radio button and enter any additional information if prompted.    Question: Is there a diagnosis associated with the clinical information Of hypotension treated with dopamine    When answering this query, please exercise your independent professional judgment. The fact that a question is being asked, does not imply that any particular answer is desired or expected.    The patient's clinical indicators include:  Clinical Information: 64 year old male presenting with dizziness and found to have symptomatic bradycardia.    Clinical Indicators:  BP's  3/19: 10:50 - 19:00    88/51, 80/48, 73/40, 92/56, 93/56    3/19 Cardiology consult: \" presenting to the ER after a fall, syncopal episode, and 1.5 weeks of tremors.  Patient states he was standing in front of his fridge for about an hour, and suddenly passed out.  Patient denies prior dizziness, lightheadedness, palpitations, chest pain, shortness of breath, or feelings of fast heart rate.  Upon arrival to the ER patient was found to be mildly hypotensive 95/55, heart rate 40\" \"EKG showed bradycardia with a ventricular rate of 39, and ST changes in anterior lead, V2.\"    3/19 Critical Care: \" ER Course: Patient found to be bradycardic, patient received one dose of atropine with minimal improvement in HR. Also received magnesium replacement and 1L fluid bolus.\"    \" Hospitals Course: Admitted to [facility]. Patient hypertensive this am, received 5 mg " "Norvasc, 0.1 mg clonidine. Since then, patient remained hypotensive ( MAPs in the 50s). Received 500 mL fluid. Transfer to ICU to initiate dopamine gtt.\"    \"Cardiovascular:  #Symptomatic bradycardia - junctional  #Hypotension 2/2 medication\"    Treatment: Cardiology consult, EKG, IV Dopamine 400mg 3/19 16:15 - 3/19 19:41, IVF's 3/18 - 3/19 100ml and 500ML  , transfer to ICU    Risk Factors: Bradycardia, A-fib, Chronic CHF, ETOH abuse, HTN, DIA, CKD 3  Options provided:  -- Cardiogenic shock  -- Hypotension only without shock  -- Other - I will add my own diagnosis  -- Refer to Clinical Documentation Reviewer    Query created by: Love Anna on 3/31/2025 2:11 PM      Electronically signed by:  CHAPINCITO JUDD PA-C 4/5/2025 6:55 PM          "

## 2025-04-07 ENCOUNTER — APPOINTMENT (OUTPATIENT)
Dept: PHARMACY | Facility: HOSPITAL | Age: 65
End: 2025-04-07
Payer: MEDICARE

## 2025-04-07 DIAGNOSIS — I50.32 CHRONIC DIASTOLIC HEART FAILURE: ICD-10-CM

## 2025-04-07 DIAGNOSIS — E11.9 DIABETES MELLITUS WITHOUT COMPLICATION: ICD-10-CM

## 2025-04-07 NOTE — Clinical Note
FYI platinum patient. Vesicare is no longer on patient formulary. Patient states he will discuss this with you when he sees you next

## 2025-04-09 ENCOUNTER — HOME CARE VISIT (OUTPATIENT)
Dept: HOME HEALTH SERVICES | Facility: HOME HEALTH | Age: 65
End: 2025-04-09
Payer: MEDICARE

## 2025-04-09 ENCOUNTER — DOCUMENTATION (OUTPATIENT)
Dept: HEMATOLOGY/ONCOLOGY | Facility: CLINIC | Age: 65
End: 2025-04-09
Payer: MEDICARE

## 2025-04-09 VITALS
RESPIRATION RATE: 16 BRPM | SYSTOLIC BLOOD PRESSURE: 120 MMHG | HEART RATE: 64 BPM | DIASTOLIC BLOOD PRESSURE: 82 MMHG | TEMPERATURE: 97.9 F | OXYGEN SATURATION: 100 %

## 2025-04-09 PROCEDURE — G0300 HHS/HOSPICE OF LPN EA 15 MIN: HCPCS | Mod: HHH

## 2025-04-09 ASSESSMENT — ENCOUNTER SYMPTOMS
APPETITE LEVEL: GOOD
CHANGE IN APPETITE: UNCHANGED
LAST BOWEL MOVEMENT: 67303
DENIES PAIN: 1

## 2025-04-09 NOTE — PROGRESS NOTES
Spoke with patient regarding labs  CBC normal  Has kidney dysfunction, but improved from March  Iron and B12 levels normal  Shae Araujo PA-C

## 2025-04-10 ENCOUNTER — APPOINTMENT (OUTPATIENT)
Dept: GASTROENTEROLOGY | Facility: EXTERNAL LOCATION | Age: 65
End: 2025-04-10
Payer: MEDICARE

## 2025-04-10 DIAGNOSIS — K31.89 OTHER DISEASES OF STOMACH AND DUODENUM: ICD-10-CM

## 2025-04-10 DIAGNOSIS — K62.89 OTHER SPECIFIED DISEASES OF ANUS AND RECTUM: ICD-10-CM

## 2025-04-10 DIAGNOSIS — K21.00 GASTROESOPHAGEAL REFLUX DISEASE WITH ESOPHAGITIS WITHOUT HEMORRHAGE: Primary | ICD-10-CM

## 2025-04-10 DIAGNOSIS — K21.00 GASTROESOPHAGEAL REFLUX DISEASE WITH ESOPHAGITIS, UNSPECIFIED WHETHER HEMORRHAGE: Primary | ICD-10-CM

## 2025-04-10 DIAGNOSIS — D50.9 IRON DEFICIENCY ANEMIA, UNSPECIFIED IRON DEFICIENCY ANEMIA TYPE: ICD-10-CM

## 2025-04-10 DIAGNOSIS — K44.9 HIATAL HERNIA: ICD-10-CM

## 2025-04-10 RX ORDER — OMEPRAZOLE 40 MG/1
40 CAPSULE, DELAYED RELEASE ORAL 2 TIMES DAILY
Qty: 60 CAPSULE | Refills: 1 | Status: SHIPPED | OUTPATIENT
Start: 2025-04-10 | End: 2025-06-09

## 2025-04-11 ENCOUNTER — LAB REQUISITION (OUTPATIENT)
Dept: LAB | Facility: HOSPITAL | Age: 65
End: 2025-04-11
Payer: MEDICARE

## 2025-04-11 ENCOUNTER — HOME CARE VISIT (OUTPATIENT)
Dept: HOME HEALTH SERVICES | Facility: HOME HEALTH | Age: 65
End: 2025-04-11
Payer: MEDICARE

## 2025-04-11 VITALS
OXYGEN SATURATION: 98 % | SYSTOLIC BLOOD PRESSURE: 100 MMHG | DIASTOLIC BLOOD PRESSURE: 68 MMHG | HEART RATE: 56 BPM | RESPIRATION RATE: 18 BRPM

## 2025-04-11 DIAGNOSIS — D50.9 IRON DEFICIENCY ANEMIA, UNSPECIFIED: ICD-10-CM

## 2025-04-11 PROCEDURE — G0157 HHC PT ASSISTANT EA 15: HCPCS | Mod: CQ,HHH

## 2025-04-14 LAB — URATE SERPL-MCNC: 5.5 MG/DL (ref 4–8)

## 2025-04-15 ENCOUNTER — HOME CARE VISIT (OUTPATIENT)
Dept: HOME HEALTH SERVICES | Facility: HOME HEALTH | Age: 65
End: 2025-04-15
Payer: MEDICARE

## 2025-04-15 VITALS
DIASTOLIC BLOOD PRESSURE: 90 MMHG | TEMPERATURE: 98 F | SYSTOLIC BLOOD PRESSURE: 120 MMHG | RESPIRATION RATE: 16 BRPM | OXYGEN SATURATION: 99 % | HEART RATE: 56 BPM

## 2025-04-15 PROCEDURE — G0299 HHS/HOSPICE OF RN EA 15 MIN: HCPCS | Mod: HHH

## 2025-04-15 ASSESSMENT — ENCOUNTER SYMPTOMS
OCCASIONAL FEELINGS OF UNSTEADINESS: 1
BOWEL PATTERN NORMAL: 1
LAST BOWEL MOVEMENT: 67310
APPETITE LEVEL: GOOD
PERSON REPORTING PAIN: PATIENT
MUSCLE WEAKNESS: 1
DRY SKIN: 1
STOOL FREQUENCY: LESS THAN DAILY
DEPRESSION: 0
LOSS OF SENSATION IN FEET: 0
DENIES PAIN: 1
DYSPNEA ACTIVITY LEVEL: AFTER AMBULATING MORE THAN 20 FT
FATIGUE: 1
CHANGE IN APPETITE: UNCHANGED
SHORTNESS OF BREATH: 1

## 2025-04-15 ASSESSMENT — ACTIVITIES OF DAILY LIVING (ADL)
AMBULATION ASSISTANCE: STAND BY ASSIST
AMBULATION ASSISTANCE: 1

## 2025-04-16 ENCOUNTER — HOME CARE VISIT (OUTPATIENT)
Dept: HOME HEALTH SERVICES | Facility: HOME HEALTH | Age: 65
End: 2025-04-16
Payer: MEDICARE

## 2025-04-16 VITALS — DIASTOLIC BLOOD PRESSURE: 78 MMHG | HEART RATE: 48 BPM | TEMPERATURE: 97.3 F | SYSTOLIC BLOOD PRESSURE: 116 MMHG

## 2025-04-16 PROCEDURE — G0151 HHCP-SERV OF PT,EA 15 MIN: HCPCS | Mod: HHH

## 2025-04-16 ASSESSMENT — ACTIVITIES OF DAILY LIVING (ADL)
HOME_HEALTH_OASIS: 00
OASIS_M1830: 01

## 2025-04-16 ASSESSMENT — ENCOUNTER SYMPTOMS: DENIES PAIN: 1

## 2025-04-18 ENCOUNTER — APPOINTMENT (OUTPATIENT)
Dept: PHARMACY | Facility: HOSPITAL | Age: 65
End: 2025-04-18
Payer: COMMERCIAL

## 2025-04-18 DIAGNOSIS — I48.0 PAROXYSMAL ATRIAL FIBRILLATION (MULTI): ICD-10-CM

## 2025-04-18 LAB
LABORATORY COMMENT REPORT: NORMAL
PATH REPORT.FINAL DX SPEC: NORMAL
PATH REPORT.GROSS SPEC: NORMAL
PATH REPORT.RELEVANT HX SPEC: NORMAL
PATH REPORT.TOTAL CANCER: NORMAL

## 2025-04-18 NOTE — RESULT ENCOUNTER NOTE
The biopsies that were performed in your small bowel did not show any evidence of celiac disease.  The biopsies performed in your stomach did not show any evidence of H. pylori bacterial infection.  The recommendation is to continue omeprazole for treatment of inflammation of the esophagus from acid reflux.  Repeat colonoscopy in 5 years.      Fide Shields MD

## 2025-04-18 NOTE — PROGRESS NOTES
Pharmacist Clinic: Cardiology Management    Dilshad Freeman is a 64 y.o. male was referred to Clinical Pharmacy Team for anticoagulation management.     Referring Provider: Stephanie Sanchez APRN*    THIS IS A FOLLOW UP PATIENT APPOINTMENT. AT LAST VISIT ON 10/18/2024 WITH PHARMACIST (Megan Douglas).    Appointment was completed by the patient who was reached at .    REVIEW OF PAST APPNT (IF APPLICABLE):   At last pharmacy appointment, patient was tolerating Eliquis well without noted adverse effects. No recent hospitalizations or falls noted at that time.      No Known Allergies    Past Medical History:   Diagnosis Date    Anxiety     Atrial fibrillation (Multi)     Body mass index (BMI)40.0-44.9, adult 2021    BMI 40.0-44.9, adult    BPH (benign prostatic hyperplasia)     Depression     H/O knee surgery     High cholesterol     History of back surgery     HX: anticoagulation     Hypertension     Personal history of alcoholism (Multi)     Prostate cancer (Multi)     Stress        Current Outpatient Medications on File Prior to Visit   Medication Sig Dispense Refill    amLODIPine (Norvasc) 10 mg tablet Take 1 tablet (10 mg) by mouth once daily. (Patient taking differently: Take 1 tablet (10 mg) by mouth once daily at bedtime.) 30 tablet 0    apixaban (Eliquis) 5 mg tablet TAKE 1 TABLET BY MOUTH EVERY 12 HOURS 180 tablet 3    aspirin 81 mg chewable tablet Chew 1 tablet (81 mg) once daily at bedtime.      benzoyl peroxide (PanoxyL) 10 % external wash Apply topically 2 times a day. 300 mL 0    calcium carbonate (Tums) 500 mg (200 mg elemental) chewable tablet Chew 1 tablet (500 mg) 4 times a day as needed for indigestion or heartburn.      clindamycin phosphate (Clindagel) 1 % gel, once daily Apply 300 g topically once daily. 1 mL 1    [] doxycycline (Vibramycin) 100 mg capsule Take 1 capsule (100 mg) by mouth 2 times a day for 14 days. Take with at least 8 ounces (large glass) of water, do  not lie down for 30 minutes after 28 capsule 0    escitalopram (Lexapro) 20 mg tablet Take 1 tablet (20 mg) by mouth once daily in the morning.      ezetimibe (Zetia) 10 mg tablet TAKE 1 TABLET BY MOUTH EVERY DAY 90 tablet 0    ferrous sulfate 324 mg (65 mg elemental iron) EC tablet (delayed release) Take 1 tablet (65 mg) by mouth once daily. 90 tablet 0    gabapentin (Neurontin) 100 mg capsule Take 1 capsule (100 mg) by mouth 3 times a day. (Patient taking differently: Take 1 capsule (100 mg) by mouth 2 times a day.) 270 capsule 0    losartan (Cozaar) 50 mg tablet Take 1 tablet (50 mg) by mouth once daily. (Patient taking differently: Take 1 tablet (50 mg) by mouth once daily at bedtime.) 90 tablet 0    metoprolol tartrate (Lopressor) 25 mg tablet Take 1 tablet (25 mg) by mouth 2 times a day. (Patient taking differently: Take 1 tablet (25 mg) by mouth once daily.) 180 tablet 0    mirtazapine (Remeron) 45 mg tablet Take 1 tablet (45 mg) by mouth once daily at bedtime.      naltrexone (Depade) 50 mg tablet Take 1 tablet (50 mg) by mouth once daily. 90 tablet 0    omeprazole (PriLOSEC) 40 mg DR capsule Take 1 capsule (40 mg) by mouth 2 times a day. Do not crush or chew. 60 capsule 1    rosuvastatin (Crestor) 40 mg tablet Take 1 tablet (40 mg) by mouth once daily. 90 tablet 0     No current facility-administered medications on file prior to visit.         RELEVANT LAB RESULTS  Lab Results   Component Value Date    BILITOT 0.4 04/04/2025    CALCIUM 9.6 04/04/2025    CO2 29 04/04/2025     04/04/2025    CREATININE 1.58 (H) 04/04/2025    GLUCOSE 98 04/04/2025    ALKPHOS 87 04/04/2025    K 4.6 04/04/2025    PROT 7.0 04/04/2025     04/04/2025    AST 27 04/04/2025    ALT 29 04/04/2025    BUN 25 (H) 04/04/2025    ANIONGAP 12 04/04/2025    MG 2.5 03/29/2025    PHOS 3.4 03/24/2025     07/20/2020    ALBUMIN 4.4 04/04/2025    AMYLASE 46 02/01/2024    LIPASE 27 12/06/2024    GFRMALE 41 (A) 09/26/2023     Lab  "Results   Component Value Date    TRIG 62 02/19/2025    CHOL 130 02/19/2025    LDLCALC 67 02/19/2025    HDL 49 02/19/2025     No results found for: \"BMCBC\", \"CBCDIF\"     PHARMACEUTICAL ASSESSMENT    Drug Interactions? No clinically significant drug interactions requiring change in therapy found at the time of this visit.     Medication Documentation Review Audit       Reviewed by Radha Sandoval RN (Registered Nurse) on 04/15/25 at 1111      Medication Order Taking? Sig Documenting Provider Last Dose Status   amLODIPine (Norvasc) 10 mg tablet [id number] Yes Take 1 tablet (10 mg) by mouth once daily.   Patient taking differently: Take 1 tablet (10 mg) by mouth once daily at bedtime.    Debra Alcantar MD  Active   apixaban (Eliquis) 5 mg tablet [id number] Yes TAKE 1 TABLET BY MOUTH EVERY 12 HOURS Stephanie Sanchez, APRN-CNP 3/18/2025 Morning Active   aspirin 81 mg chewable tablet [id number] Yes Chew 1 tablet (81 mg) once daily at bedtime. Historical Provider, MD  Active   benzoyl peroxide (PanoxyL) 10 % external wash [id number]  Apply topically 2 times a day. Yahaira Alcantar MD  Active   calcium carbonate (Tums) 500 mg (200 mg elemental) chewable tablet [id number] Yes Chew 1 tablet (500 mg) 4 times a day as needed for indigestion or heartburn. Debra Alcantar MD  Active   clindamycin phosphate (Clindagel) 1 % gel, once daily [id number] Yes Apply 300 g topically once daily. Yahaira Alcantar MD  Active   escitalopram (Lexapro) 20 mg tablet [id number] Yes Take 1 tablet (20 mg) by mouth once daily in the morning. Deepak Provider, MD 3/18/2025 Morning Active   ezetimibe (Zetia) 10 mg tablet [id number] Yes TAKE 1 TABLET BY MOUTH EVERY DAY Yahaira Alcantar MD 3/17/2025 Bedtime Active   ferrous sulfate 324 mg (65 mg elemental iron) EC tablet (delayed release) [id number] Yes Take 1 tablet (65 mg) by mouth once daily. Yahaira Alcantar MD 3/17/2025 Bedtime Active   gabapentin (Neurontin) 100 mg capsule [id number] Yes Take 1 capsule (100 mg) " by mouth 3 times a day.   Patient taking differently: Take 1 capsule (100 mg) by mouth 2 times a day.    Yahaira Alcantar MD  Active   losartan (Cozaar) 50 mg tablet [id number] Yes Take 1 tablet (50 mg) by mouth once daily.   Patient taking differently: Take 1 tablet (50 mg) by mouth once daily at bedtime.    Yahaira Alcantar MD  Active   metoprolol tartrate (Lopressor) 25 mg tablet [id number] Yes Take 1 tablet (25 mg) by mouth 2 times a day.   Patient taking differently: Take 1 tablet (25 mg) by mouth once daily.    Yahaira Alcantar MD  Active   mirtazapine (Remeron) 45 mg tablet [id number] Yes Take 1 tablet (45 mg) by mouth once daily at bedtime. Deepak Walls MD  Active   naltrexone (Depade) 50 mg tablet [id number] Yes Take 1 tablet (50 mg) by mouth once daily. Yahaira Alcantar MD 3/18/2025 Morning Active   omeprazole (PriLOSEC) 40 mg DR capsule [id number]  Take 1 capsule (40 mg) by mouth 2 times a day. Do not crush or chew. Fide Shields MD  Active   rosuvastatin (Crestor) 40 mg tablet [id number] Yes Take 1 tablet (40 mg) by mouth once daily. Yahaira Alcantar MD  Active                    DISEASE MANAGEMENT ASSESSMENT:     ANTICOAGULATION ASSESSMENT    The 10-year ASCVD risk score (Kia HESTER, et al., 2019) is: 22.4%    Values used to calculate the score:      Age: 64 years      Sex: Male      Is Non- : Yes      Diabetic: Yes      Tobacco smoker: No      Systolic Blood Pressure: 116 mmHg      Is BP treated: Yes      HDL Cholesterol: 49 mg/dL      Total Cholesterol: 130 mg/dL    DIAGNOSIS: prevention of nonvalvular atrial fibrilliation stroke and systemic embolism  - Patient is projected to be on anticoagulation indefinitely (risk vs benefit)  - QUF1CO7-HCSD Score: [3] (only included if diagnosis is atrial fibrillation)   Age: [<65 (0)] [65-74 (+1)] [> 75 (+2)]: 1  Sex: [Male/Female (+1)]: 0  CHF history: [No/Yes(+1)]: 1  Hypertension history: [No/Yes(+1)]: 1  Stroke/TIA/thromboembolism  history: [No/Yes(+2)]: 0  Vascular disease history (prior MI, peripheral artery disease, aortic plaque): [No/Yes(+1)]: 0  Diabetes history: [No/Yes(+1)]: 0 - prediabetes    CURRENT PHARMACOTHERAPY:   Eliquis 5 mg BID  63 y/o  112 kg  Scr 1.58 mg/dL (4/4/2025)    RELEVANT PAST MEDICAL HISTORY:   A-fib, HTN, CHF, DLD, CAD, prediabetes, Hx of alcohol abuse    Affordability/Accessibility:  PAP for Eliquis - active through 7/22/2025  Adherence/Organization: confirms taking twice daily (morning and night; 12 hours apart)  Adverse Reactions: patient denies any bruising/bleeding, no dark tarry/stools, no hematemesis. Denies any dizziness or lightheadedness since hospital discharge  Recent Hospitalizations: Yes, 3/18-3/24/2025 for symptomatic bradycardia. Amlodipine was started, amiodarone and clonidine were discontinued.  Recent Falls/Trauma: Yes, fell last month when walking to the fridge after loss of consciousness. Was admitted (see above).  Changes in Tobacco or Alcohol Intake:   Tobacco: none, patient does not use  Alcohol: none; previously quit    EDUCATION/COUNSELING:   - Counseled patient on MOA, expectations, duration of therapy, contraindications, administration, and monitoring parameters  - Counseled patient of side effects that are indicative of bleeding such as dark tarry stool, unexplainable bruising, or vomiting up a coffee ground like substance      DISCUSSION/NOTES:   Today's appointment was 6 month follow up regarding anticoagulation with Eliquis.   Dilshad reports no abnormal bruising or bleeding. He was admitted for symptomatic bradycardia in March after a fall in which he lost consciousness. He reports no dizziness or lightheadedness since discharge. Advised to call the office if he does note these symptoms, and to ensure seeking medical attention for any future falls as well.  Will follow up in 2 months for  PAP renewal.    ASSESSMENT AND PLAN:    Assessment/Plan   Problem List Items Addressed This  Visit       Paroxysmal atrial fibrillation (Multi)    Dilshad continues on anticoagulation with Eliquis. No dosage adjustment required for age, weight, and renal function.         Relevant Orders    Referral to Clinical Pharmacy       RECOMMENDATIONS/PLAN    Continue: Eliquis 5 mg BID  Follow up: 2 months    Last Appnt with Referring Provider: 10/16/2024  Next Appnt with Referring Provider: To be scheduled; patient confirmed he will call to schedule follow up appointment   Clinical Pharmacist follow up: 6/24/2025  VAF/Application Expiration: Yes    Date: 7/22/2025  Type of Encounter: Winsome Segura PharmD    Verbal consent to manage patient's drug therapy was obtained from the patient . They were informed they may decline to participate or withdraw from participation in pharmacy services at any time.    Continue all meds under the continuation of care with the referring provider and clinical pharmacy team.

## 2025-04-18 NOTE — Clinical Note
Dilshad Mckenna reports no abnormal bruising or bleeding. He was admitted for symptomatic bradycardia in March after a fall in which he lost consciousness.  Amlodipine was started, amiodarone and clonidine were discontinued. He reports no dizziness or lightheadedness since discharge. No changes today, will follow up in 2 months for  PAP renewal.

## 2025-04-18 NOTE — ASSESSMENT & PLAN NOTE
Dilshad continues on anticoagulation with Eliquis. No dosage adjustment required for age, weight, and renal function.

## 2025-04-19 ENCOUNTER — APPOINTMENT (OUTPATIENT)
Dept: PRIMARY CARE | Facility: CLINIC | Age: 65
End: 2025-04-19
Payer: MEDICARE

## 2025-04-19 VITALS
HEIGHT: 66 IN | WEIGHT: 247 LBS | BODY MASS INDEX: 39.7 KG/M2 | DIASTOLIC BLOOD PRESSURE: 70 MMHG | SYSTOLIC BLOOD PRESSURE: 136 MMHG

## 2025-04-19 DIAGNOSIS — R82.6 ABNORMAL URINE LEVELS OF SUBSTANCES CHIEFLY NONMEDICINAL AS TO SOURCE: ICD-10-CM

## 2025-04-19 DIAGNOSIS — I10 PRIMARY HYPERTENSION: ICD-10-CM

## 2025-04-19 DIAGNOSIS — C61 PROSTATE CANCER (MULTI): ICD-10-CM

## 2025-04-19 DIAGNOSIS — Z98.890 HISTORY OF BACK SURGERY: ICD-10-CM

## 2025-04-19 DIAGNOSIS — I48.0 PAROXYSMAL ATRIAL FIBRILLATION (MULTI): ICD-10-CM

## 2025-04-19 DIAGNOSIS — Z00.00 MEDICARE ANNUAL WELLNESS VISIT, INITIAL: ICD-10-CM

## 2025-04-19 DIAGNOSIS — E78.00 HIGH CHOLESTEROL: ICD-10-CM

## 2025-04-19 DIAGNOSIS — R73.03 PRE-DIABETES: ICD-10-CM

## 2025-04-19 DIAGNOSIS — Z13.29 THYROID DISORDER SCREENING: ICD-10-CM

## 2025-04-19 DIAGNOSIS — N32.81 OVERACTIVE BLADDER: Primary | ICD-10-CM

## 2025-04-19 DIAGNOSIS — I25.10 CORONARY ARTERY DISEASE, UNSPECIFIED VESSEL OR LESION TYPE, UNSPECIFIED WHETHER ANGINA PRESENT, UNSPECIFIED WHETHER NATIVE OR TRANSPLANTED HEART: ICD-10-CM

## 2025-04-19 RX ORDER — AMLODIPINE BESYLATE 10 MG/1
10 TABLET ORAL DAILY
Qty: 90 TABLET | Refills: 0 | Status: SHIPPED | OUTPATIENT
Start: 2025-04-19

## 2025-04-19 RX ORDER — OXYBUTYNIN CHLORIDE 5 MG/1
5 TABLET ORAL 2 TIMES DAILY
Qty: 180 TABLET | Refills: 1 | Status: SHIPPED | OUTPATIENT
Start: 2025-04-19 | End: 2025-10-16

## 2025-04-19 RX ORDER — METOPROLOL TARTRATE 25 MG/1
25 TABLET, FILM COATED ORAL 2 TIMES DAILY
Qty: 180 TABLET | Refills: 0 | Status: SHIPPED | OUTPATIENT
Start: 2025-04-19 | End: 2025-07-18

## 2025-04-19 ASSESSMENT — ENCOUNTER SYMPTOMS
DEPRESSION: 0
OCCASIONAL FEELINGS OF UNSTEADINESS: 0
LOSS OF SENSATION IN FEET: 0

## 2025-04-19 ASSESSMENT — ACTIVITIES OF DAILY LIVING (ADL)
BATHING: INDEPENDENT
DRESSING: INDEPENDENT

## 2025-04-19 NOTE — PROGRESS NOTES
"Subjective   Patient ID: Dilshad Freeman is a 64 y.o. male who presents for Medicare Annual Wellness Visit Initial.    Mr. Dilshad Freeman today came here for:  1. Medicare Wellness Visit.  2. Polypharmacy review.  3. He has overactive bladder, solifenacin is not covered, he wants alternative.  Overall, he is good.    IMMUNIZATION:  He can use new shingles shot.  Otherwise all shots are good.    I have personally reviewed the patient's Past Medical History, Medications, Allergies, Social History, and Family History in the EMR.    Review of Systems   All other systems reviewed and are negative.  The patient has never had a heart attack.  No stroke.  No diabetes.  Other than prostate cancer, no cancers, but ______ surgery.     Objective   /70   Ht 1.676 m (5' 6\")   Wt 112 kg (247 lb)   BMI 39.87 kg/m²     Physical Exam  Vitals reviewed.   HENT:      Right Ear: Tympanic membrane, ear canal and external ear normal.      Left Ear: Tympanic membrane, ear canal and external ear normal.   Eyes:      General: No scleral icterus.     Pupils: Pupils are equal, round, and reactive to light.   Neck:      Vascular: No carotid bruit.   Cardiovascular:      Heart sounds: Normal heart sounds, S1 normal and S2 normal. No murmur heard.     No friction rub.   Pulmonary:      Effort: Pulmonary effort is normal.      Breath sounds: Normal breath sounds and air entry.   Abdominal:      Palpations: There is no hepatomegaly, splenomegaly or mass.   Genitourinary:     Prostate: Normal.   Musculoskeletal:         General: No swelling or deformity. Normal range of motion.      Cervical back: Neck supple.      Right lower leg: No edema.      Left lower leg: No edema.   Lymphadenopathy:      Cervical: No cervical adenopathy.      Upper Body:      Right upper body: No axillary adenopathy.      Left upper body: No axillary adenopathy.      Lower Body: No right inguinal adenopathy. No left inguinal adenopathy.   Neurological:      Mental " Status: He is oriented to person, place, and time.      Cranial Nerves: Cranial nerves 2-12 are intact. No cranial nerve deficit.      Sensory: No sensory deficit.      Motor: Motor function is intact. No weakness.      Gait: Gait is intact.      Deep Tendon Reflexes: Reflexes normal.   Psychiatric:         Mood and Affect: Mood normal. Mood is not anxious or depressed. Affect is not angry.         Behavior: Behavior is not agitated.         Thought Content: Thought content normal.         Judgment: Judgment normal.     LAB WORK:  Laboratory testing done, discussed.    Assessment/Plan   Problem List Items Addressed This Visit           ICD-10-CM    Hypertension I10    Relevant Orders    CBC    Urinalysis with Reflex Culture and Microscopic    Pre-diabetes R73.03    Relevant Orders    Hemoglobin A1C    Paroxysmal atrial fibrillation (Multi) I48.0    Relevant Medications    metoprolol tartrate (Lopressor) 25 mg tablet    amLODIPine (Norvasc) 10 mg tablet    CAD (coronary artery disease) I25.10    Relevant Medications    metoprolol tartrate (Lopressor) 25 mg tablet    amLODIPine (Norvasc) 10 mg tablet     Other Visit Diagnoses         Codes      Overactive bladder    -  Primary N32.81    Relevant Medications    oxybutynin (Ditropan) 5 mg tablet      High cholesterol     E78.00    Relevant Orders    Comprehensive Metabolic Panel    Lipid Panel      Thyroid disorder screening     Z13.29    Relevant Orders    Thyroid Stimulating Hormone      Abnormal urine levels of substances chiefly nonmedicinal as to source     R82.6    Relevant Orders    Urinalysis with Reflex Culture and Microscopic      Medicare annual wellness visit, initial     Z00.00      History of back surgery     Z98.890      Prostate cancer (Multi)     C61        1. Medicare Wellness Visit, done.  2. The patient is full code.  The patient’s sister, her name is ______ (Hillary Cobos) will be legal power of  in case of situation.  3. The patient is not  depressed.  He is not suicidal.  Taking medications.  4. Status post back surgery, doing good.  5. Coronary artery disease.  Monitor.  6. Hypertension, okay.  7. High cholesterol, stable.  8. Prostate cancer.  Monitor.  9. Blood work ordered.  10. I shall see him back in about three months, but always happy to see him anytime sooner if necessary.  11. Overactive bladder.  I changed the medication to oxybutynin.  12. I thanked him for participating in Medicare Wellness Visit.    Dennisibstephanie Attestation  By signing my name below, IKirsty Scribe attest that this documentation has been prepared under the direction and in the presence of Yahaira Alcantar MD.     All medical record entries made by the jamison were personally dictated by me I have reviewed the chart and agree the record accurately reflects my personal performance of his history physical examination and management

## 2025-04-28 PROCEDURE — RXMED WILLOW AMBULATORY MEDICATION CHARGE

## 2025-05-01 ENCOUNTER — PHARMACY VISIT (OUTPATIENT)
Dept: PHARMACY | Facility: CLINIC | Age: 65
End: 2025-05-01
Payer: COMMERCIAL

## 2025-05-24 LAB
ALBUMIN SERPL-MCNC: 4.2 G/DL (ref 3.6–5.1)
ALP SERPL-CCNC: 66 U/L (ref 35–144)
ALT SERPL-CCNC: 38 U/L (ref 9–46)
ANION GAP SERPL CALCULATED.4IONS-SCNC: 5 MMOL/L (CALC) (ref 7–17)
AST SERPL-CCNC: 32 U/L (ref 10–35)
BASOPHILS # BLD AUTO: 40 CELLS/UL (ref 0–200)
BASOPHILS NFR BLD AUTO: 0.8 %
BILIRUB SERPL-MCNC: 0.4 MG/DL (ref 0.2–1.2)
BUN SERPL-MCNC: 31 MG/DL (ref 7–25)
CALCIUM SERPL-MCNC: 8.7 MG/DL (ref 8.6–10.3)
CHLORIDE SERPL-SCNC: 111 MMOL/L (ref 98–110)
CO2 SERPL-SCNC: 27 MMOL/L (ref 20–32)
CREAT SERPL-MCNC: 1.76 MG/DL (ref 0.7–1.35)
EGFRCR SERPLBLD CKD-EPI 2021: 42 ML/MIN/1.73M2
EOSINOPHIL # BLD AUTO: 220 CELLS/UL (ref 15–500)
EOSINOPHIL NFR BLD AUTO: 4.4 %
ERYTHROCYTE [DISTWIDTH] IN BLOOD BY AUTOMATED COUNT: 15.1 % (ref 11–15)
GLUCOSE SERPL-MCNC: 86 MG/DL (ref 65–99)
HCT VFR BLD AUTO: 38.7 % (ref 38.5–50)
HGB BLD-MCNC: 12.1 G/DL (ref 13.2–17.1)
IRON SERPL-MCNC: 77 MCG/DL (ref 50–180)
LDH SERPL P TO L-CCNC: 142 U/L (ref 120–250)
LYMPHOCYTES # BLD AUTO: 1865 CELLS/UL (ref 850–3900)
LYMPHOCYTES NFR BLD AUTO: 37.3 %
MCH RBC QN AUTO: 30.3 PG (ref 27–33)
MCHC RBC AUTO-ENTMCNC: 31.3 G/DL (ref 32–36)
MCV RBC AUTO: 96.8 FL (ref 80–100)
MONOCYTES # BLD AUTO: 550 CELLS/UL (ref 200–950)
MONOCYTES NFR BLD AUTO: 11 %
NEUTROPHILS # BLD AUTO: 2325 CELLS/UL (ref 1500–7800)
NEUTROPHILS NFR BLD AUTO: 46.5 %
PLATELET # BLD AUTO: 286 THOUSAND/UL (ref 140–400)
PMV BLD REES-ECKER: 9.4 FL (ref 7.5–12.5)
POTASSIUM SERPL-SCNC: 4.8 MMOL/L (ref 3.5–5.3)
PROT SERPL-MCNC: 6.5 G/DL (ref 6.1–8.1)
RBC # BLD AUTO: 4 MILLION/UL (ref 4.2–5.8)
SODIUM SERPL-SCNC: 143 MMOL/L (ref 135–146)
VIT B12 SERPL-MCNC: 580 PG/ML (ref 200–1100)
WBC # BLD AUTO: 5 THOUSAND/UL (ref 3.8–10.8)

## 2025-05-31 ENCOUNTER — APPOINTMENT (OUTPATIENT)
Dept: PRIMARY CARE | Facility: CLINIC | Age: 65
End: 2025-05-31
Payer: MEDICARE

## 2025-05-31 VITALS
HEIGHT: 66 IN | DIASTOLIC BLOOD PRESSURE: 72 MMHG | WEIGHT: 247 LBS | SYSTOLIC BLOOD PRESSURE: 134 MMHG | BODY MASS INDEX: 39.7 KG/M2

## 2025-05-31 DIAGNOSIS — G47.30 SLEEP APNEA, UNSPECIFIED TYPE: Primary | ICD-10-CM

## 2025-05-31 DIAGNOSIS — N18.2 CHRONIC RENAL FAILURE, STAGE 2 (MILD): ICD-10-CM

## 2025-05-31 DIAGNOSIS — G89.29 OTHER CHRONIC PAIN: ICD-10-CM

## 2025-05-31 DIAGNOSIS — R82.6 ABNORMAL URINE LEVELS OF SUBSTANCES CHIEFLY NONMEDICINAL AS TO SOURCE: ICD-10-CM

## 2025-05-31 DIAGNOSIS — K21.00 GASTROESOPHAGEAL REFLUX DISEASE WITH ESOPHAGITIS, UNSPECIFIED WHETHER HEMORRHAGE: ICD-10-CM

## 2025-05-31 DIAGNOSIS — G62.9 NEUROPATHY: ICD-10-CM

## 2025-05-31 DIAGNOSIS — I1A.0 RESISTANT HYPERTENSION: ICD-10-CM

## 2025-05-31 DIAGNOSIS — R10.84 GENERALIZED ABDOMINAL PAIN: ICD-10-CM

## 2025-05-31 DIAGNOSIS — E78.00 HYPERCHOLESTEROLEMIA: ICD-10-CM

## 2025-05-31 DIAGNOSIS — E21.3 HYPERPARATHYROIDISM (MULTI): ICD-10-CM

## 2025-05-31 DIAGNOSIS — R73.03 PRE-DIABETES: ICD-10-CM

## 2025-05-31 DIAGNOSIS — Z13.29 THYROID DISORDER SCREENING: ICD-10-CM

## 2025-05-31 DIAGNOSIS — D63.8 ANEMIA IN OTHER CHRONIC DISEASES CLASSIFIED ELSEWHERE: ICD-10-CM

## 2025-05-31 RX ORDER — GABAPENTIN 100 MG/1
100 CAPSULE ORAL 3 TIMES DAILY
Qty: 270 CAPSULE | Refills: 0 | Status: SHIPPED | OUTPATIENT
Start: 2025-05-31 | End: 2025-06-02 | Stop reason: SDUPTHER

## 2025-05-31 RX ORDER — ROSUVASTATIN CALCIUM 40 MG/1
40 TABLET, COATED ORAL DAILY
Qty: 90 TABLET | Refills: 0 | Status: SHIPPED | OUTPATIENT
Start: 2025-05-31

## 2025-05-31 RX ORDER — FERROUS SULFATE 324(65)MG
1 TABLET, DELAYED RELEASE (ENTERIC COATED) ORAL DAILY
Qty: 90 TABLET | Refills: 0 | Status: SHIPPED | OUTPATIENT
Start: 2025-05-31

## 2025-05-31 RX ORDER — OMEPRAZOLE 40 MG/1
40 CAPSULE, DELAYED RELEASE ORAL 2 TIMES DAILY
Qty: 60 CAPSULE | Refills: 1 | Status: SHIPPED | OUTPATIENT
Start: 2025-05-31 | End: 2025-07-30

## 2025-05-31 NOTE — PROGRESS NOTES
"Subjective   Patient ID: Dilshad Freeman is a 65 y.o. male who presents for Follow-up (on blood work and other conditions).    This gentleman, Mr. Freeman today came here for multiple medical issues.  1. He is very frustrated, his insurance company made him return.  He is without the CPAP machine right now because of insurance issue and logistics.  He is frustrated with it.  2. Follow-up on blood work and other conditions.  He lives by himself.    I have personally reviewed the patient's Past Medical History, Medications, Allergies, Social History, and Family History in the EMR.    Review of Systems   All other systems reviewed and are negative.    Objective   /72   Ht 1.676 m (5' 6\")   Wt 112 kg (247 lb)   BMI 39.87 kg/m²     Physical Exam  Vitals reviewed.   Cardiovascular:      Heart sounds: Normal heart sounds, S1 normal and S2 normal. No murmur heard.     No friction rub.   Pulmonary:      Effort: Pulmonary effort is normal.      Breath sounds: Normal breath sounds and air entry.   Abdominal:      Palpations: There is no hepatomegaly, splenomegaly or mass.   Musculoskeletal:      Right lower leg: No edema.      Left lower leg: No edema.   Lymphadenopathy:      Lower Body: No right inguinal adenopathy. No left inguinal adenopathy.   Neurological:      Cranial Nerves: Cranial nerves 2-12 are intact.      Sensory: No sensory deficit.      Motor: Motor function is intact.      Deep Tendon Reflexes: Reflexes are normal and symmetric.     LAB WORK:  Laboratory testing discussed.    Assessment/Plan   Problem List Items Addressed This Visit           ICD-10-CM    Abdominal pain R10.9    Relevant Medications    gabapentin (Neurontin) 100 mg capsule    Anemia D64.9    Relevant Medications    ferrous sulfate 324 mg (65 mg elemental iron) EC tablet (delayed release)    GERD (gastroesophageal reflux disease) K21.9    Relevant Medications    omeprazole (PriLOSEC) 40 mg DR capsule    Hypercholesterolemia E78.00    " Relevant Medications    rosuvastatin (Crestor) 40 mg tablet    Other Relevant Orders    Comprehensive Metabolic Panel    Lipid Panel    Hypertension I10    Relevant Orders    CBC    Thyroid Stimulating Hormone    Pre-diabetes R73.03    Relevant Orders    Hemoglobin A1C    Hyperparathyroidism (Multi) E21.3    Relevant Orders    Thyroid Stimulating Hormone    Urinalysis with Reflex Culture and Microscopic     Other Visit Diagnoses         Codes      Sleep apnea, unspecified type    -  Primary G47.30      Abnormal urine levels of substances chiefly nonmedicinal as to source     R82.6    Relevant Orders    Urinalysis with Reflex Culture and Microscopic      Chronic renal failure, stage 2 (mild)     N18.2      Thyroid disorder screening     Z13.29      Other chronic pain     G89.29      Neuropathy     G62.9        1. Sleep apnea.  The patient definitely needs CPAP machine, it is his life threatening situation.  We must set up his machine right away.  2. Chronic renal failure, stage II to III, improved.  3. Hypertension, okay.  4. High cholesterol, stable.  5. Thyroid.  Monitor.  6. Chronic pain, on medication.  7. Neuropathy, on gabapentin.  8. PDMP report is okay.  9. Follow-up with me in about a couple of months.  Always happy to see him anytime sooner if necessary.    Scribe Attestation  By signing my name below, I, George Sibley attest that this documentation has been prepared under the direction and in the presence of Yahaira Alcantar MD.     All medical record entries made by the scribe were personally dictated by me I have reviewed the chart and agree the record accurately reflects my personal performance of his history physical examination and management

## 2025-06-02 DIAGNOSIS — R10.84 GENERALIZED ABDOMINAL PAIN: ICD-10-CM

## 2025-06-02 RX ORDER — GABAPENTIN 100 MG/1
100 CAPSULE ORAL 3 TIMES DAILY
Qty: 270 CAPSULE | Refills: 0 | Status: SHIPPED | OUTPATIENT
Start: 2025-06-02 | End: 2025-08-31

## 2025-06-03 ENCOUNTER — OFFICE VISIT (OUTPATIENT)
Dept: CARDIOLOGY | Facility: CLINIC | Age: 65
End: 2025-06-03
Payer: MEDICARE

## 2025-06-03 VITALS
HEIGHT: 68 IN | BODY MASS INDEX: 40.87 KG/M2 | OXYGEN SATURATION: 98 % | DIASTOLIC BLOOD PRESSURE: 68 MMHG | SYSTOLIC BLOOD PRESSURE: 105 MMHG | WEIGHT: 269.7 LBS | HEART RATE: 54 BPM

## 2025-06-03 DIAGNOSIS — R94.31 ABNORMAL EKG: ICD-10-CM

## 2025-06-03 DIAGNOSIS — I48.91 ATRIAL FIBRILLATION, UNSPECIFIED TYPE (MULTI): Primary | ICD-10-CM

## 2025-06-03 PROCEDURE — 99212 OFFICE O/P EST SF 10 MIN: CPT | Performed by: NURSE PRACTITIONER

## 2025-06-03 ASSESSMENT — ENCOUNTER SYMPTOMS
NEUROLOGICAL NEGATIVE: 1
CONSTITUTIONAL NEGATIVE: 1
MUSCULOSKELETAL NEGATIVE: 1
CARDIOVASCULAR NEGATIVE: 1
RESPIRATORY NEGATIVE: 1
GASTROINTESTINAL NEGATIVE: 1

## 2025-06-03 ASSESSMENT — PAIN SCALES - GENERAL: PAINLEVEL_OUTOF10: 0-NO PAIN

## 2025-06-03 NOTE — PROGRESS NOTES
"Chief Complaint:   Follow-up    History Of Present Illness:    .This is a 64 y/o male here today for a Cardiology follow up visit. Denies chest pain, sob, palpitations, or edema.. He was at [facility] for bradycardia.  Meds adjusted.  Home pulse 80-85.              Last Recorded Vitals:  Blood pressure 105/68, pulse 54, height 1.727 m (5' 8\"), weight 122 kg (269 lb 11.2 oz), SpO2 98%.     Past Medical History:  Medical History[1]     Past Surgical History:  Surgical History[2]    Social History:  Social History[3]    Family History:  Family History[4]      Allergies:  Patient has no known allergies.    Outpatient Medications:  Current Medications[5]     Physical Exam:  Cardiovascular:      PMI at left midclavicular line. Normal rate. Regular rhythm. Normal S1. Normal S2.       Murmurs: There is no murmur.      No gallop.  No click. No rub.   Pulses:     Intact distal pulses.   Edema:     Peripheral edema absent.         ROS:  Review of Systems   Constitutional: Negative.   Cardiovascular: Negative.    Respiratory: Negative.     Skin: Negative.    Musculoskeletal: Negative.    Gastrointestinal: Negative.    Genitourinary: Negative.    Neurological: Negative.           Last Labs: reviewed  CBC -  Lab Results   Component Value Date    WBC 5.0 05/23/2025    HGB 12.1 (L) 05/23/2025    HCT 38.7 05/23/2025    MCV 96.8 05/23/2025     05/23/2025       CMP -  Lab Results   Component Value Date    CALCIUM 8.7 05/23/2025    PHOS 3.4 03/24/2025    PROT 6.5 05/23/2025    ALBUMIN 4.2 05/23/2025    AST 32 05/23/2025    ALT 38 05/23/2025    ALKPHOS 66 05/23/2025    BILITOT 0.4 05/23/2025       LIPID PANEL -   Lab Results   Component Value Date    CHOL 130 02/19/2025    TRIG 62 02/19/2025    HDL 49 02/19/2025    CHHDL 2.7 02/19/2025    LDLF 96 04/14/2023    VLDL 20 04/23/2024    NHDL 81 02/19/2025       RENAL FUNCTION PANEL -   Lab Results   Component Value Date    GLUCOSE 86 05/23/2025     05/23/2025    K 4.8 05/23/2025    CL " 111 (H) 05/23/2025    CO2 27 05/23/2025    ANIONGAP 5 (L) 05/23/2025    BUN 31 (H) 05/23/2025    CREATININE 1.76 (H) 05/23/2025    GFRMALE 41 (A) 09/26/2023    CALCIUM 8.7 05/23/2025    PHOS 3.4 03/24/2025    ALBUMIN 4.2 05/23/2025        Lab Results   Component Value Date    HGBA1C 5.5 03/29/2025         Assessment/Plan   Problem List Items Addressed This Visit    None    1. A. fib. Was admitted to [facility] August 2017 with decreased shortness of breath and possible CHF by chest x-ray. Pharmacological nuclear stress test was negative for ischemia. He was again admitted to the [facility] October 4, 2019 with new onset atrial fibrillation. He had an echocardiogram which on preliminary read showed preserved LV ejection fraction in the upper 50% range without significant valvular abnormality. Follow-up office visit October 23, 2019 showed sinus bradycardia.  2. Diastolic dysfunction. Last echocardiogram done in January 2019 showed an EF of 55-60%, bilateral atrial enlargement, mild TR.  Echo 03/2025  CONCLUSIONS:   1. The left ventricular systolic function is normal, with a visually estimated ejection fraction of 60-65%.   2. There is normal right ventricular global systolic function.   3. Mild left atrial dilation.   3. Hyperlipidemia. Last fasting lipid panel showed cholesterol 130, HDL 49, LDL 67, triglycerides 62. Continue rosuvastatin 40 mg daily. Will add Zetia 10 mg daily.  4. Obesity. Successful weight loss with Contrave. Now will have bariatric surgery.   5. Hypertension. Adequately controlled with current medications.  6. Obstructive sleep apnea. On CPAP.  7. Bilateral hip replacement history of back surgery  8. CAD. Had coronary artery calcium score of 370.1 when done 04/2022. Lexiscan done 04/2023 was negative for ischemia.   9.  CKD.  Cr 1.76.  10.  Bradycardia with hospital stay 03/2025.  Patient states he was standing in front of his fridge for about an hour, and suddenly his legs gave  out and he fell.  Did not pass out.  Meds were adjusted. DIA resolved with IV.  Had been 100 days sober from half a bottle of Henessy a day.  Will defer monitor and EP referral for now as home pulse 80-85.  54 bpm here today.  Return in 3 months.  Stephanie Sanchez, APRN-CNP         [1]   Past Medical History:  Diagnosis Date    Anxiety     Atrial fibrillation (Multi)     Body mass index (BMI)40.0-44.9, adult 04/26/2021    BMI 40.0-44.9, adult    BPH (benign prostatic hyperplasia)     Depression     H/O knee surgery     High cholesterol     History of back surgery     HX: anticoagulation     Hypertension     Overactive bladder     Personal history of alcoholism (Multi)     Prostate cancer (Multi)     Stress     Thyroid disorder screening    [2]   Past Surgical History:  Procedure Laterality Date    BACK SURGERY      OTHER SURGICAL HISTORY  07/01/2013    Reported Hx Of Hip Replacement - Left Side    OTHER SURGICAL HISTORY  07/01/2013    Reported Hx Of Hip Replacement - Right Side   [3]   Social History  Socioeconomic History    Marital status: Single   Occupational History    Occupation: retired   Tobacco Use    Smoking status: Never    Smokeless tobacco: Never   Substance and Sexual Activity    Alcohol use: Never     Social Drivers of Health     Financial Resource Strain: Low Risk  (3/19/2025)    Overall Financial Resource Strain (CARDIA)     Difficulty of Paying Living Expenses: Not very hard   Food Insecurity: No Food Insecurity (3/19/2025)    Hunger Vital Sign     Worried About Running Out of Food in the Last Year: Never true     Ran Out of Food in the Last Year: Never true   Transportation Needs: No Transportation Needs (4/16/2025)    OASIS : Transportation     Lack of Transportation (Medical): No     Lack of Transportation (Non-Medical): No     Patient Unable or Declines to Respond: No   Physical Activity: Insufficiently Active (3/19/2025)    Exercise Vital Sign     Days of Exercise per Week: 3 days      Minutes of Exercise per Session: 10 min   Stress: No Stress Concern Present (3/19/2025)    [employer] [place] [employer] - Occupational Stress Questionnaire     Feeling of Stress : Not at all   Social Connections: Feeling Somewhat Isolated (4/16/2025)    OASIS : Social Isolation     Frequency of experiencing loneliness or isolation: Sometimes   Intimate Partner Violence: Not At Risk (3/19/2025)    Humiliation, Afraid, Rape, and Kick questionnaire     Fear of Current or Ex-Partner: No     Emotionally Abused: No     Physically Abused: No     Sexually Abused: No   Housing Stability: Low Risk  (3/19/2025)    Housing Stability Vital Sign     Unable to Pay for Housing in the Last Year: No     Number of Times Moved in the Last Year: 0     Homeless in the Last Year: No   [4]   Family History  Family history unknown: Yes   [5]   Current Outpatient Medications   Medication Sig Dispense Refill    amLODIPine (Norvasc) 10 mg tablet Take 1 tablet (10 mg) by mouth once daily. 90 tablet 0    apixaban (Eliquis) 5 mg tablet TAKE 1 TABLET BY MOUTH EVERY 12 HOURS 180 tablet 3    aspirin 81 mg chewable tablet Chew 1 tablet (81 mg) once daily at bedtime.      benzoyl peroxide (PanoxyL) 10 % external wash Apply topically 2 times a day. 300 mL 0    escitalopram (Lexapro) 20 mg tablet Take 1 tablet (20 mg) by mouth once daily in the morning.      ezetimibe (Zetia) 10 mg tablet TAKE 1 TABLET BY MOUTH EVERY DAY 90 tablet 0    ferrous sulfate 324 mg (65 mg elemental iron) EC tablet (delayed release) Take 1 tablet (65 mg) by mouth once daily. 90 tablet 0    gabapentin (Neurontin) 100 mg capsule Take 1 capsule (100 mg) by mouth 3 times a day. 270 capsule 0    losartan (Cozaar) 50 mg tablet Take 1 tablet (50 mg) by mouth once daily. 90 tablet 0    metoprolol tartrate (Lopressor) 25 mg tablet Take 1 tablet (25 mg) by mouth 2 times a day. 180 tablet 0    mirtazapine (Remeron) 45 mg tablet Take 1 tablet (45 mg) by mouth once daily at  bedtime.      naltrexone (Depade) 50 mg tablet Take 1 tablet (50 mg) by mouth once daily. 90 tablet 0    omeprazole (PriLOSEC) 40 mg DR capsule Take 1 capsule (40 mg) by mouth 2 times a day. Do not crush or chew. 60 capsule 1    oxybutynin (Ditropan) 5 mg tablet Take 1 tablet (5 mg) by mouth 2 times a day. 180 tablet 1    rosuvastatin (Crestor) 40 mg tablet Take 1 tablet (40 mg) by mouth once daily. 90 tablet 0     No current facility-administered medications for this visit.

## 2025-06-08 DIAGNOSIS — K21.00 GASTROESOPHAGEAL REFLUX DISEASE WITH ESOPHAGITIS, UNSPECIFIED WHETHER HEMORRHAGE: ICD-10-CM

## 2025-06-09 RX ORDER — OMEPRAZOLE 40 MG/1
40 CAPSULE, DELAYED RELEASE ORAL
Qty: 90 CAPSULE | Refills: 2 | Status: SHIPPED | OUTPATIENT
Start: 2025-06-09

## 2025-06-23 NOTE — PROGRESS NOTES
Pharmacist Clinic: Cardiology Management    Dilshad Freeman is a 65 y.o. male was referred to Clinical Pharmacy Team for anticoagulation management.     Referring Provider: Stephanie Sanchez APRN*    THIS IS A FOLLOW UP PATIENT APPOINTMENT. AT LAST VISIT ON 4/18/2025 WITH PHARMACIST (Shy Segura).    Appointment was completed by the patient who was reached at .    REVIEW OF PAST APPNT (IF APPLICABLE):   Today's appointment was 6 month follow up regarding anticoagulation with Miguel.   Dilshad reports no abnormal bruising or bleeding. He was admitted for symptomatic bradycardia in March after a fall in which he lost consciousness. He reports no dizziness or lightheadedness since discharge. Advised to call the office if he does note these symptoms, and to ensure seeking medical attention for any future falls as well.  Will follow up in 2 months for  PAP renewal.    Allergies Reviewed? Yes    No Known Allergies    Past Medical History:   Diagnosis Date    Anxiety     Atrial fibrillation (Multi)     Body mass index (BMI)40.0-44.9, adult 04/26/2021    BMI 40.0-44.9, adult    BPH (benign prostatic hyperplasia)     Depression     H/O knee surgery     High cholesterol     History of back surgery     HX: anticoagulation     Hypertension     Overactive bladder     Personal history of alcoholism (Multi)     Prostate cancer (Multi)     Stress     Thyroid disorder screening        Current Outpatient Medications on File Prior to Visit   Medication Sig Dispense Refill    amLODIPine (Norvasc) 10 mg tablet Take 1 tablet (10 mg) by mouth once daily. 90 tablet 0    aspirin 81 mg chewable tablet Chew 1 tablet (81 mg) once daily at bedtime.      benzoyl peroxide (PanoxyL) 10 % external wash Apply topically 2 times a day. 300 mL 0    escitalopram (Lexapro) 20 mg tablet Take 1 tablet (20 mg) by mouth once daily in the morning.      ezetimibe (Zetia) 10 mg tablet TAKE 1 TABLET BY MOUTH EVERY DAY 90 tablet 0    ferrous  "sulfate 324 mg (65 mg elemental iron) EC tablet (delayed release) Take 1 tablet (65 mg) by mouth once daily. 90 tablet 0    gabapentin (Neurontin) 100 mg capsule Take 1 capsule (100 mg) by mouth 3 times a day. 270 capsule 0    losartan (Cozaar) 50 mg tablet Take 1 tablet (50 mg) by mouth once daily. 90 tablet 0    metoprolol tartrate (Lopressor) 25 mg tablet Take 1 tablet (25 mg) by mouth 2 times a day. 180 tablet 0    mirtazapine (Remeron) 45 mg tablet Take 1 tablet (45 mg) by mouth once daily at bedtime.      multivitamin tablet Take 1 tablet by mouth once daily.      naltrexone (Depade) 50 mg tablet Take 1 tablet (50 mg) by mouth once daily. 90 tablet 0    omeprazole (PriLOSEC) 40 mg DR capsule Take 1 capsule (40 mg) by mouth once daily in the morning. Take before meals. 90 capsule 2    oxybutynin (Ditropan) 5 mg tablet Take 1 tablet (5 mg) by mouth 2 times a day. 180 tablet 1    rosuvastatin (Crestor) 40 mg tablet Take 1 tablet (40 mg) by mouth once daily. 90 tablet 0    [DISCONTINUED] apixaban (Eliquis) 5 mg tablet TAKE 1 TABLET BY MOUTH EVERY 12 HOURS 180 tablet 3     No current facility-administered medications on file prior to visit.         RELEVANT LAB RESULTS  Lab Results   Component Value Date    BILITOT 0.4 05/23/2025    CALCIUM 8.7 05/23/2025    CO2 27 05/23/2025     (H) 05/23/2025    CREATININE 1.76 (H) 05/23/2025    GLUCOSE 86 05/23/2025    ALKPHOS 66 05/23/2025    K 4.8 05/23/2025    PROT 6.5 05/23/2025     05/23/2025    AST 32 05/23/2025    ALT 38 05/23/2025    BUN 31 (H) 05/23/2025    ANIONGAP 5 (L) 05/23/2025    MG 2.5 03/29/2025    PHOS 3.4 03/24/2025     05/23/2025    ALBUMIN 4.2 05/23/2025    AMYLASE 46 02/01/2024    LIPASE 27 12/06/2024    GFRMALE 41 (A) 09/26/2023     Lab Results   Component Value Date    TRIG 62 02/19/2025    CHOL 130 02/19/2025    LDLCALC 67 02/19/2025    HDL 49 02/19/2025     No results found for: \"BMCBC\", \"CBCDIF\"     PHARMACEUTICAL " ASSESSMENT    MEDICATION RECONCILIATION   Was a medication reconciliation completed at today's appointment? Yes    Added  - Multivitamin: 1 tablet by mouth daily    Changed  - None    Removed  - None    Drug Interactions? No clinically significant drug interactions requiring change in therapy found at the time of this visit.     Medication Documentation Review Audit       Reviewed by Shy Segura, PharmD (Pharmacist) on 06/24/25 at 1103      Medication Order Taking? Sig Documenting Provider Last Dose Status   amLODIPine (Norvasc) 10 mg tablet [id number] Yes Take 1 tablet (10 mg) by mouth once daily. Yahaira Alcantar MD  Active   apixaban (Eliquis) 5 mg tablet [id number] Yes TAKE 1 TABLET BY MOUTH EVERY 12 HOURS SIMRAN Powell-CNP  Active   aspirin 81 mg chewable tablet [id number] Yes Chew 1 tablet (81 mg) once daily at bedtime. Historical Provider, MD  Active   benzoyl peroxide (PanoxyL) 10 % external wash [id number] Yes Apply topically 2 times a day. Yahaira Alcantar MD  Active   escitalopram (Lexapro) 20 mg tablet [id number] Yes Take 1 tablet (20 mg) by mouth once daily in the morning. Historical Provider, MD  Active   ezetimibe (Zetia) 10 mg tablet [id number] Yes TAKE 1 TABLET BY MOUTH EVERY DAY Yahaira Alcantar MD  Active   ferrous sulfate 324 mg (65 mg elemental iron) EC tablet (delayed release) [id number] Yes Take 1 tablet (65 mg) by mouth once daily. Yahaira Alcantar MD  Active   gabapentin (Neurontin) 100 mg capsule [id number] Yes Take 1 capsule (100 mg) by mouth 3 times a day. Yahaira Alcantar MD  Active   losartan (Cozaar) 50 mg tablet [id number] Yes Take 1 tablet (50 mg) by mouth once daily. Yahaira Alcantar MD  Active   metoprolol tartrate (Lopressor) 25 mg tablet [id number] Yes Take 1 tablet (25 mg) by mouth 2 times a day. Yahaira Alcantar MD  Active   mirtazapine (Remeron) 45 mg tablet [id number] Yes Take 1 tablet (45 mg) by mouth once daily at bedtime. Historical Provider, MD  Active   naltrexone (Depade)  50 mg tablet [id number] Yes Take 1 tablet (50 mg) by mouth once daily. Yahaira Alcantar MD  Active   omeprazole (PriLOSEC) 40 mg DR capsule [id number] Yes Take 1 capsule (40 mg) by mouth once daily in the morning. Take before meals. Fide Shields MD  Active   oxybutynin (Ditropan) 5 mg tablet [id number] Yes Take 1 tablet (5 mg) by mouth 2 times a day. Yahaira Alcantar MD  Active   rosuvastatin (Crestor) 40 mg tablet [id number] Yes Take 1 tablet (40 mg) by mouth once daily. Yahaira Alcantar MD  Active                    DISEASE MANAGEMENT ASSESSMENT:     ANTICOAGULATION ASSESSMENT    The 10-year ASCVD risk score (Kia DK, et al., 2019) is: 19.6%    Values used to calculate the score:      Age: 65 years      Sex: Male      Is Non- : Yes      Diabetic: Yes      Tobacco smoker: No      Systolic Blood Pressure: 105 mmHg      Is BP treated: Yes      HDL Cholesterol: 49 mg/dL      Total Cholesterol: 130 mg/dL    DIAGNOSIS: prevention of nonvalvular atrial fibrilliation stroke and systemic embolism  - Patient is projected to be on anticoagulation indefinitely (risk vs benefit)  - [id number] Score: [3] (only included if diagnosis is atrial fibrillation)   Age: [<65 (0)] [65-74 (+1)] [> 75 (+2)]: 1  Sex: [Male/Female (+1)]: 0  CHF history: [No/Yes(+1)]: 1  Hypertension history: [No/Yes(+1)]: 1  Stroke/TIA/thromboembolism history: [No/Yes(+2)]: 0  Vascular disease history (prior MI, peripheral artery disease, aortic plaque): [No/Yes(+1)]: 0  Diabetes history: [No/Yes(+1)]: 0 - prediabetes    CURRENT PHARMACOTHERAPY:   Eliquis 5 mg BID  66 y/o  122 kg  Scr 1.76 mg/dL (5/23/2025)    RELEVANT PAST MEDICAL HISTORY:   A-fib, HTN, CHF, DLD, CAD, prediabetes, Hx of alcohol abuse    Affordability/Accessibility: [facility] for Eliquis - active through 7/22/2025  Adherence/Organization: confirms taking twice daily (morning and night; 12 hours apart)  Adverse Reactions: patient denies any bruising/bleeding, no dark  tarry/stools.  Recent Hospitalizations: none  Recent Falls/Trauma: none, reports no dizziness  Changes in Tobacco or Alcohol Intake:   Tobacco: none, patient does not use  Alcohol: none; previously quit    EDUCATION/COUNSELING:   - Counseled patient on MOA, expectations, duration of therapy, contraindications, administration, and monitoring parameters  - Counseled patient of side effects that are indicative of bleeding such as dark tarry stool, unexplainable bruising, or vomiting up a coffee ground like substance     Patient Assistance Program (PAP) - RENEWAL     Per regulation, patient is due for their annual renewal for their  PAP application. Patient's application is due for renewal by 7/22/2025. Patient understands that if proper documentation is not received before renewal date, they will no longer be able to receive approved medication(s) free of charge.     Application for program to be submitted for the following medications: [facility]    [facility] Permanent Address: [facility]   Prescription Insurance:   Yes   Members of Household: 1   Files Taxes: No - SSB only       Patient will be other: income documents received prior to appointment    Patient verbally reports monthly or yearly income which is less than 400% federal poverty level    Patient aware this process may take up to 6 weeks.     If approved medication must be filled through [facility] and MEDICATION WILL BE MAILED TO PATIENT.     DISCUSSION/NOTES:   Today's appointment was 2 month follow up regarding anticoagulation with Eliquedwin.  Dilshad reports no abnormal bruising or bleeding. No hospitalizations or falls since last appointment.   Completed a medication reconciliation today, adjustments to medication list are noted above.  Will apply for  PAP renewal and follow up in 11 months.    ASSESSMENT AND PLAN:    Assessment/Plan   Problem List Items Addressed This Visit       Paroxysmal atrial fibrillation (Multi)    Dilshad continues on  anticoagulation with Eliquis. No dosage adjustment required for age, weight, and renal function.         Relevant Medications    apixaban (Eliquis) 5 mg tablet    Other Relevant Orders    Referral to Clinical Pharmacy     RECOMMENDATIONS/PLAN    Continue: Eliquis 5 mg BID  Follow up: 11 months    Last Appnt with Referring Provider: 6/3/2025  Next Appnt with Referring Provider: 10/7/2025  Clinical Pharmacist follow up: 5/18/2026  VAF/Application Expiration: Yes    Date: 7/22/2025  Type of Encounter: Winsome Segura PharmD    Verbal consent to manage patient's drug therapy was obtained from the patient . They were informed they may decline to participate or withdraw from participation in pharmacy services at any time.    Continue all meds under the continuation of care with the referring provider and clinical pharmacy team.

## 2025-06-24 ENCOUNTER — APPOINTMENT (OUTPATIENT)
Dept: PHARMACY | Facility: HOSPITAL | Age: 65
End: 2025-06-24
Payer: MEDICARE

## 2025-06-24 DIAGNOSIS — I48.0 PAROXYSMAL ATRIAL FIBRILLATION (MULTI): ICD-10-CM

## 2025-06-24 RX ORDER — BISMUTH SUBSALICYLATE 262 MG
1 TABLET,CHEWABLE ORAL DAILY
COMMUNITY

## 2025-06-24 NOTE — Clinical Note
Dilshad Mckenna reports no abnormal bruising or bleeding with Eliquis. No hospitalizations or falls since last appointment. Will apply for  PAP renewal and follow up in 11 months.

## 2025-06-25 ENCOUNTER — APPOINTMENT (OUTPATIENT)
Dept: PAIN MEDICINE | Facility: CLINIC | Age: 65
End: 2025-06-25
Payer: MEDICARE

## 2025-06-25 DIAGNOSIS — M96.1 LUMBAR POSTLAMINECTOMY SYNDROME: ICD-10-CM

## 2025-06-25 DIAGNOSIS — M54.16 RIGHT LUMBAR RADICULOPATHY: Primary | ICD-10-CM

## 2025-06-25 RX ORDER — GABAPENTIN 300 MG/1
300 CAPSULE ORAL 3 TIMES DAILY
Qty: 90 CAPSULE | Refills: 1 | Status: SHIPPED | OUTPATIENT
Start: 2025-06-25 | End: 2025-07-25

## 2025-06-25 NOTE — PROGRESS NOTES
Chief complaint  Back and right leg pain      Dylon DALEY JUANCARLOSN,   was present during the entire history and physical examination    History  Dilshad Freeman is back for pain management office visit  I have not seen him for about 3 years.  The pain is coming back in the back and right leg   Was doing well on Marcella 400 mg tid but then dropped to 100 mg tid and the pain down the back of hte Rll got worse  The pain burning and tingling on a continuous fashion. The pain goes in aggravation intermittently with sharp lancinating, electrical like jolts and sensations. These are felt on the skin and deeper in the tissues.  The pain is affected with colder weather and with wet and humid conditions.        Review of Systems :  Denied any fever or chills. No weight loss and no night sweats. No cough or sputum production. No diarrhea   The constipation has been responding to fibers and over the counter medications.     No bladder and bowel incontinence and no other changes in bladder and bowel. No skin changes.  Reports tiredness and fatigability only if the pain is not controlled.     I further Asked about symptoms or changes affecting the vision, hearing, breathing, digestive system, urinary symptoms, skin, other musculoskeletal condition, neurological conditions these are negative except as detailed in the history and physical examination above         Physical examination  Awake, alert and oriented for time place and persons   Pupils are equal and reactive to light and accommodation    Examination of the lumbar spine showed tight muscle bands in the mid and lower back area, this is more pronounced on the right compared to the left.  Additionally, this is inducing mild reversal of the lumbar lordosis with functional scoliosis with right-sided concavity.  This scoliosis corrected with  bending of the lumbar spine.     Straight leg raising increased the pain in the back and down the lateral aspect of the right knee onto the lateral  leg to the lateral malleolus and foot.     There is decreased sensory to light touch on the lateral aspect of the thigh and around the right knee going down to the lateral aspect of the leg to the right lateral malleolus into the dorsum of the foot..    Deep tendon reflexes is present for the patellar tendons bilaterally.  Achilles reflexes are present bilaterally and symmetric.    Medial Hamstrings reflex is decreased on the right compared to the left side.  Plantar cutaneous are downgoing.  Ankle dorsiflexion is 5/5 bilaterally.  Plantar flexion of the ankles are 5/5 bilaterally.  Big toe extension is 4/5 on the right compared to 5/5 on the left side.    Negative Tinel's sign over the right peroneal nerve at the fibular neck.  Cecelia sign for axial loading and global rotation are negative.  No aberrant pain behavior.           Diagnosis  Problem List Items Addressed This Visit       Lumbar postlaminectomy syndrome    Relevant Medications    gabapentin (Neurontin) 300 mg capsule    Right lumbar radiculopathy - Primary    Relevant Medications    gabapentin (Neurontin) 300 mg capsule        Plan  Reviewed the pain generators.  Went over the types of pain with neuropathic and nociceptive and different pathologies and therapeutic modalities. Discussed the mechanism of action of interventions from acupuncture, physical therapy , regular exercises, injections, botox, spinal cord stimulation, and role of surgery     Went over pathology of the intervertebral disc displacement and the anatomical relation to the Nerve roots and relation to the radicular symptoms. Went over treatment modalities with conservative treatment including acupuncture   and epidural steroid injection with fluoroscopy guidance and last resort of surgery            Discussed about NSAIDS and I explained about the opioids sparing effect to allow keeping the opioids dose at minimal effective dose.   I went over the potential side effects of the NSAIDS  on the gastrointestinal, renal and cardiovascular systems.      I detailed the side effects from the acetaminophen in the medication and made aware of those. I also explained about the cumulative effects on the organs and mainly the liver.      Focus of Today's Visit :  Go back to gabapentin 300 mg tid he will follow-up with Dr. Anderson for further medication.  We can help him as well  If that does not help consider JUANITO  I also discussed with her about spinal cord stimulator      Follow-up 8 weeks or earlier if needed     The level of clinical decision making in this office visit,  is high, given the high risks of complications with the morbidity and mortality due to the fact that acute and chronic pain may pose a threat to life and bodily function, if under treated, poorly treated, or with failure to maintain adequate treatment and timely medical follow up. Additionally over treatment has its own set of complications including overdosing on the pain medications and also the habit forming potentials with the use of the medications used to treat chronic painful conditions including therapeutic classes classified as dangerous medications. Given the serious and fluctuating nature of pain level and instensity with extensive consideration for whenever pain changes, there is always the risk of prolonged functional impairment requiring close patient monitoring with regular assessments and reassessments and high level medical decision making at every office visit. The amount and complexity of data reviewed is high given the patient clinical presentation, labs,  data, radiology reports, and other tests as discussed during office visits. Pertinent data whether positive or negative were taken in consideration in the process of making this high level medical decision.

## 2025-06-27 ENCOUNTER — TELEPHONE (OUTPATIENT)
Dept: PHARMACY | Facility: HOSPITAL | Age: 65
End: 2025-06-27
Payer: MEDICARE

## 2025-06-27 NOTE — TELEPHONE ENCOUNTER
Patient Assistance Program Approval:     We are pleased to inform you that your application for assistance has been approved.     This approval is valid through 6/26/2026 as long as the following criteria continue to be satisfied:     Your medication (Eliquis) remains covered under your current insurance plan.   Your prescriber does not discontinue therapy.   You do not seek reimbursement from any other private or government-funded programs for the  medication.    Under this program, the pharmacy will first bill your insurance plan for your indemnified specified medication. The [employer] will then offset your copay balance, so that your out-of pocket expense for your specialty medication will be $0.00.    Shy Segura, PharmD

## 2025-07-09 DIAGNOSIS — N32.81 OVERACTIVE BLADDER: ICD-10-CM

## 2025-07-09 DIAGNOSIS — I48.0 PAROXYSMAL ATRIAL FIBRILLATION (MULTI): ICD-10-CM

## 2025-07-09 DIAGNOSIS — K21.00 GASTROESOPHAGEAL REFLUX DISEASE WITH ESOPHAGITIS, UNSPECIFIED WHETHER HEMORRHAGE: ICD-10-CM

## 2025-07-09 DIAGNOSIS — I10 PRIMARY HYPERTENSION: ICD-10-CM

## 2025-07-09 RX ORDER — LOSARTAN POTASSIUM 50 MG/1
50 TABLET ORAL DAILY
Qty: 90 TABLET | Refills: 0 | Status: SHIPPED | OUTPATIENT
Start: 2025-07-09 | End: 2025-10-07

## 2025-07-09 RX ORDER — AMLODIPINE BESYLATE 10 MG/1
10 TABLET ORAL DAILY
Qty: 90 TABLET | Refills: 0 | Status: SHIPPED | OUTPATIENT
Start: 2025-07-09

## 2025-07-09 RX ORDER — OXYBUTYNIN CHLORIDE 5 MG/1
5 TABLET ORAL 2 TIMES DAILY
Qty: 180 TABLET | Refills: 0 | Status: SHIPPED | OUTPATIENT
Start: 2025-07-09

## 2025-07-09 RX ORDER — OMEPRAZOLE 40 MG/1
40 CAPSULE, DELAYED RELEASE ORAL
Qty: 90 CAPSULE | Refills: 0 | Status: SHIPPED | OUTPATIENT
Start: 2025-07-09

## 2025-07-19 DIAGNOSIS — E78.00 HIGH CHOLESTEROL: ICD-10-CM

## 2025-07-19 DIAGNOSIS — I10 PRIMARY HYPERTENSION: ICD-10-CM

## 2025-07-19 DIAGNOSIS — Z13.29 THYROID DISORDER SCREENING: ICD-10-CM

## 2025-07-19 DIAGNOSIS — R73.03 PRE-DIABETES: ICD-10-CM

## 2025-07-28 DIAGNOSIS — I48.0 PAROXYSMAL ATRIAL FIBRILLATION (MULTI): ICD-10-CM

## 2025-07-28 DIAGNOSIS — E78.00 HYPERCHOLESTEROLEMIA: ICD-10-CM

## 2025-07-28 PROCEDURE — RXMED WILLOW AMBULATORY MEDICATION CHARGE

## 2025-07-28 RX ORDER — METOPROLOL TARTRATE 25 MG/1
25 TABLET, FILM COATED ORAL 2 TIMES DAILY
Qty: 180 TABLET | Refills: 0 | Status: SHIPPED | OUTPATIENT
Start: 2025-07-28 | End: 2025-10-26

## 2025-07-28 RX ORDER — ROSUVASTATIN CALCIUM 40 MG/1
40 TABLET, COATED ORAL DAILY
Qty: 90 TABLET | Refills: 0 | Status: SHIPPED | OUTPATIENT
Start: 2025-07-28

## 2025-07-29 ENCOUNTER — PHARMACY VISIT (OUTPATIENT)
Dept: PHARMACY | Facility: CLINIC | Age: 65
End: 2025-07-29
Payer: COMMERCIAL

## 2025-08-22 LAB
ALBUMIN SERPL-MCNC: 4.2 G/DL (ref 3.6–5.1)
ALP SERPL-CCNC: 102 U/L (ref 35–144)
ALT SERPL-CCNC: 35 U/L (ref 9–46)
ANION GAP SERPL CALCULATED.4IONS-SCNC: 5 MMOL/L (CALC) (ref 7–17)
AST SERPL-CCNC: 45 U/L (ref 10–35)
BILIRUB SERPL-MCNC: 0.4 MG/DL (ref 0.2–1.2)
BUN SERPL-MCNC: 22 MG/DL (ref 7–25)
CALCIUM SERPL-MCNC: 9.1 MG/DL (ref 8.6–10.3)
CHLORIDE SERPL-SCNC: 108 MMOL/L (ref 98–110)
CHOLEST SERPL-MCNC: 138 MG/DL
CHOLEST/HDLC SERPL: 2.3 (CALC)
CO2 SERPL-SCNC: 29 MMOL/L (ref 20–32)
CREAT SERPL-MCNC: 1.74 MG/DL (ref 0.7–1.35)
EGFRCR SERPLBLD CKD-EPI 2021: 42 ML/MIN/1.73M2
ERYTHROCYTE [DISTWIDTH] IN BLOOD BY AUTOMATED COUNT: 14.9 % (ref 11–15)
EST. AVERAGE GLUCOSE BLD GHB EST-MCNC: 114 MG/DL
EST. AVERAGE GLUCOSE BLD GHB EST-SCNC: 6.3 MMOL/L
GLUCOSE SERPL-MCNC: 86 MG/DL (ref 65–99)
HBA1C MFR BLD: 5.6 %
HCT VFR BLD AUTO: 42 % (ref 38.5–50)
HDLC SERPL-MCNC: 61 MG/DL
HGB BLD-MCNC: 13.3 G/DL (ref 13.2–17.1)
LDLC SERPL CALC-MCNC: 65 MG/DL (CALC)
MCH RBC QN AUTO: 31.7 PG (ref 27–33)
MCHC RBC AUTO-ENTMCNC: 31.7 G/DL (ref 32–36)
MCV RBC AUTO: 100 FL (ref 80–100)
NONHDLC SERPL-MCNC: 77 MG/DL (CALC)
PLATELET # BLD AUTO: 226 THOUSAND/UL (ref 140–400)
PMV BLD REES-ECKER: 9.4 FL (ref 7.5–12.5)
POTASSIUM SERPL-SCNC: 5 MMOL/L (ref 3.5–5.3)
PROT SERPL-MCNC: 6.7 G/DL (ref 6.1–8.1)
RBC # BLD AUTO: 4.2 MILLION/UL (ref 4.2–5.8)
SODIUM SERPL-SCNC: 142 MMOL/L (ref 135–146)
TRIGL SERPL-MCNC: 42 MG/DL
TSH SERPL-ACNC: 1.31 MIU/L (ref 0.4–4.5)
WBC # BLD AUTO: 4.7 THOUSAND/UL (ref 3.8–10.8)

## 2025-09-02 ENCOUNTER — APPOINTMENT (OUTPATIENT)
Dept: PRIMARY CARE | Facility: CLINIC | Age: 65
End: 2025-09-02
Payer: MEDICARE

## 2025-09-26 ENCOUNTER — APPOINTMENT (OUTPATIENT)
Dept: PRIMARY CARE | Facility: CLINIC | Age: 65
End: 2025-09-26
Payer: MEDICARE

## 2026-05-18 ENCOUNTER — APPOINTMENT (OUTPATIENT)
Dept: PHARMACY | Facility: HOSPITAL | Age: 66
End: 2026-05-18
Payer: MEDICARE